# Patient Record
Sex: MALE | Race: AMERICAN INDIAN OR ALASKA NATIVE | NOT HISPANIC OR LATINO | Employment: OTHER | URBAN - METROPOLITAN AREA
[De-identification: names, ages, dates, MRNs, and addresses within clinical notes are randomized per-mention and may not be internally consistent; named-entity substitution may affect disease eponyms.]

---

## 2017-09-27 ENCOUNTER — ALLSCRIPTS OFFICE VISIT (OUTPATIENT)
Dept: OTHER | Facility: OTHER | Age: 47
End: 2017-09-27

## 2017-11-17 ENCOUNTER — ALLSCRIPTS OFFICE VISIT (OUTPATIENT)
Dept: OTHER | Facility: OTHER | Age: 47
End: 2017-11-17

## 2017-11-19 NOTE — PROGRESS NOTES
Assessment    1  Otitis media, right (382 9) (H66 91)   2  BMI 30 0-30 9,adult (V85 30) (Z68 30)   3  Never a smoker    Plan  BMI 30 0-30 9,adult    · Begin a limited exercise program ; Status:Complete;   Done: [id number]  Otitis media, right    · Amoxicillin-Pot Clavulanate 875-125 MG Oral Tablet; TAKE 1 TABLET EVERY 12HOURS DAILY    Discussion/Summary    Drink plenty of fluids  Saline nasal rinses or spray as needed for congestion  Salt water gargles and hot tea with honey and lemon for sore throat  May use Mucinex D for sinus congestion  Take antibiotics until finished  Follow up as needed for persistent or worsening symptoms  Possible side effects of new medications were reviewed with the patient/guardian today  The treatment plan was reviewed with the patient/guardian  The patient/guardian understands and agrees with the treatment plan      Chief Complaint  Sinus pressure pressure in ears rmklpn      History of Present Illness  HPI: Over a week ago he developed URI symptoms  He has sinus pressure and his ears are bothering him  He has continued sinus congestion, wheezing  Denies fevers, cough, or SOB  He is not taking anything OTC for symptoms  Review of Systems   Constitutional: no fever-- and-- no chills  ENT: as noted in HPI  Cardiovascular: no chest pain  Respiratory: wheezing, but-- no shortness of breath-- and-- no cough  Neurological: headache  Active Problems    1  Allergic rhinitis (477 9) (J30 9)   2  Elevated liver enzymes (790 5) (R74 8)   3  Fatigue (780 79) (R53 83)   4  Hyperlipidemia (272 4) (E78 5)   5  Hypothyroidism (244 9) (E03 9)   6  Impaired fasting glucose (790 21) (R73 01)   7  Impingement syndrome of right shoulder (726 2) (M75 41)   8  Joint pain, knee (719 46) (M25 569)   9  Limb pain (729 5) (M79 609)   10  Lumbago (724 2) (M54 5)   11  Organic impotence (607 84) (N52 9)   12  Right knee pain (719 46) (M25 561)   13  Rosacea (695 3) (L71 9)   14   Tear of medial meniscus of right knee, initial encounter (836 0) (S83 241A)   15  Tear of medial meniscus of right knee, subsequent encounter (V58 89,836 0)  (V53 870V)    Past Medical History    1  History of Acute allergic conjunctivitis (372 14) (H10 10)   2  History of Acute maxillary sinusitis (461 0) (J01 00)   3  Acute otitis externa (380 10) (H60 509)   4  History of Acute upper respiratory infection (465 9) (J06 9)   5  Cerumen impaction (380 4) (H61 20)   6  History of Dysuria (788 1) (R30 0)   7  History of Facial cellulitis (682 0) (L03 211)   8  History of acute bronchitis (V12 69) (Z87 09)   9  History of acute sinusitis (V12 69) (Z87 09)   10  History of acute sinusitis (V12 69) (Z87 09)   11  History of acute sinusitis (V12 69) (Z87 09)   12  History of folliculitis (B54 9) (W07 5)   13  History of low back pain (V13 59) (Z87 39)   14  History of seborrheic dermatitis (V13 3) (Z87 2)   15  Influenza vaccine needed (V04 81) (Z23)   16  History of Otitis externa (380 10) (H60 90)   17  Otitis media, left (382 9) (Q34 75)  Active Problems And Past Medical History Reviewed: The active problems and past medical history were reviewed and updated today  Family History  Mother    1  Family history of Malignant neoplasm of female breast, unspecified laterality, unspecified site of breast  Father    2  No pertinent family history  Sister    3  Family history of malignant neoplasm (V16 9) (Z80 9)    Social History   · Alcohol use (V49 89) (Z78 9)   · Never a smoker   · History of Never a smoker  The social history was reviewed and is unchanged  Surgical History    1  History of Knee Surgery    Current Meds   1  Synthroid 100 MCG Oral Tablet; take 1 tablet by mouth once daily; Therapy: 69Mau9914 to (Evaluate:07Apr2018)  Requested for: 76AZL9746; Last Rx:09Oct2017 Ordered    The medication list was reviewed and updated today  Allergies  1   No Known Drug Allergies    Vitals   Recorded: 47SOT4545 04:13PM Temperature 97 8 F   Heart Rate 66   Respiration 18   Systolic 321   Diastolic 80   Height 5 ft 9 in   Weight 204 lb    BMI Calculated 30 13   BSA Calculated 2 08       Physical Exam   Constitutional  General appearance: No acute distress, well appearing and well nourished  Eyes  Conjunctiva and lids: No swelling, erythema, or discharge  Ears, Nose, Mouth, and Throat  Otoscopic examination: Abnormal   The right tympanic membrane was red-- and-- was bulging  The left tympanic membrane was normal   Nasal mucosa, septum, and turbinates: Abnormal   There was a mucoid discharge from both nares  Oropharynx: Normal with no erythema, edema, exudate or lesions  Pulmonary  Respiratory effort: No increased work of breathing or signs of respiratory distress  Auscultation of lungs: Abnormal   Auscultation of the lungs revealed expiratory wheezing  Cardiovascular  Auscultation of heart: Normal rate and rhythm, normal S1 and S2, without murmurs  Examination of extremities for edema and/or varicosities: Normal    Lymphatic  Palpation of lymph nodes in neck: No lymphadenopathy  Skin  Skin and subcutaneous tissue: Normal without rashes or lesions  Psychiatric  Mood and affect: Normal          Attending Note  Collaborating Physician Note: Collaborating Note: I agree with the Advanced Practitioner note        Signatures   Electronically signed by : Lavera Saint; Nov 17 2017  4:23PM EST                       (Author)    Electronically signed by : Lucas Block DO; Nov 17 2017  4:32PM EST                       (Review)

## 2017-11-27 ENCOUNTER — ALLSCRIPTS OFFICE VISIT (OUTPATIENT)
Dept: OTHER | Facility: OTHER | Age: 47
End: 2017-11-27

## 2017-11-28 NOTE — PROGRESS NOTES
Assessment    1  Otitis media, right (382 9) (H66 91)   2  Allergic reaction (995 3) (T78 40XA)    Discussion/Summary    Allergic reaction - suspect reaction to Augmentin  Advised to avoid Penicillinsmedia - improving  Chief Complaint  achy everywhere skin very sensitive even scalp tired and feels lethargic klpn      History of Present Illness  HPI: He has finished his Augmentin  He is still lethargic  His skin is sensitive  he is achy  he has had fevers  Review of Systems   ENT: earache, but-- no sore throat  Respiratory: cough  Active Problems    1  Allergic rhinitis (477 9) (J30 9)   2  BMI 30 0-30 9,adult (V85 30) (Z68 30)   3  Elevated liver enzymes (790 5) (R74 8)   4  Fatigue (780 79) (R53 83)   5  Hyperlipidemia (272 4) (E78 5)   6  Hypothyroidism (244 9) (E03 9)   7  Impaired fasting glucose (790 21) (R73 01)   8  Impingement syndrome of right shoulder (726 2) (M75 41)   9  Joint pain, knee (719 46) (M25 569)   10  Limb pain (729 5) (M79 609)   11  Lumbago (724 2) (M54 5)   12  Organic impotence (607 84) (N52 9)   13  Otitis media, right (382 9) (H66 91)   14  Right knee pain (719 46) (M25 561)   15  Rosacea (695 3) (L71 9)   16  Tear of medial meniscus of right knee, initial encounter (836 0) (S83 241A)   17  Tear of medial meniscus of right knee, subsequent encounter (V58 89,836 0)  (T49 912W)    Past Medical History    1  History of Acute allergic conjunctivitis (372 14) (H10 10)   2  History of Acute maxillary sinusitis (461 0) (J01 00)   3  Acute otitis externa (380 10) (H60 509)   4  History of Acute upper respiratory infection (465 9) (J06 9)   5  Cerumen impaction (380 4) (H61 20)   6  History of Dysuria (788 1) (R30 0)   7  History of Facial cellulitis (682 0) (L03 211)   8  History of acute bronchitis (V12 69) (Z87 09)   9  History of acute sinusitis (V12 69) (Z87 09)   10  History of acute sinusitis (V12 69) (Z87 09)   11  History of acute sinusitis (V12 69) (Z87 09)   12  History of folliculitis (L47 4) (Q45 1)   13  History of low back pain (V13 59) (Z87 39)   14  History of seborrheic dermatitis (V13 3) (Z87 2)   15  Influenza vaccine needed (V04 81) (Z23)   16  History of Otitis externa (380 10) (H60 90)   17  Otitis media, left (382 9) (H66 92)    Family History  Mother    1  Family history of Malignant neoplasm of female breast, unspecified laterality, unspecified site of breast  Father    2  No pertinent family history  Sister    3  Family history of malignant neoplasm (V16 9) (Z80 9)    Social History   · Alcohol use (V49 89) (Z78 9)   · Never a smoker   · History of Never a smoker    Surgical History    1  History of Knee Surgery    Current Meds   1  Synthroid 100 MCG Oral Tablet; take 1 tablet by mouth once daily; Therapy: 57Xro6939 to (Evaluate:07Apr2018)  Requested for: 58YDG6859; Last Rx:09Oct2017 Ordered    Allergies  1  Augmentin    Vitals   Recorded: 10UVQ0278 10:25AM   Temperature 97 2 F   Heart Rate 72   Respiration 18   Systolic 582   Diastolic 70   Height 5 ft 9 in   Weight 202 lb    BMI Calculated 29 83   BSA Calculated 2 07       Physical Exam   Constitutional  General appearance: No acute distress, well appearing and well nourished  Ears, Nose, Mouth, and Throat  External inspection of ears and nose: Normal    Otoscopic examination: Tympanic membrance translucent with normal light reflex  Canals patent without erythema  Oropharynx: Normal with no erythema, edema, exudate or lesions  Pulmonary  Auscultation of lungs: Clear to auscultation, equal breath sounds bilaterally, no wheezes, no rales, no rhonci  no rales or crackles were heard bilaterally  no rhonchi  no friction rub  no wheezing  Cardiovascular  Auscultation of heart: Normal rate and rhythm, normal S1 and S2, without murmurs     Skin  Skin and subcutaneous tissue: Abnormal  -- diffuse erythematous rash over upper back and chest         Signatures   Electronically signed by : Radha Allen DO; Nov 27 2017 10:43AM EST                       (Author)

## 2018-01-08 ENCOUNTER — APPOINTMENT (OUTPATIENT)
Dept: RADIOLOGY | Facility: CLINIC | Age: 48
End: 2018-01-08
Payer: COMMERCIAL

## 2018-01-08 ENCOUNTER — ALLSCRIPTS OFFICE VISIT (OUTPATIENT)
Dept: OTHER | Facility: OTHER | Age: 48
End: 2018-01-08

## 2018-01-08 DIAGNOSIS — M25.569 PAIN IN KNEE: ICD-10-CM

## 2018-01-08 PROCEDURE — 73562 X-RAY EXAM OF KNEE 3: CPT

## 2018-01-09 NOTE — PROGRESS NOTES
Assessment   1  Localized osteoarthritis of right knee (548 05) (M17 11)    Plan   Joint pain, knee    · * XR KNEE 3 VW RIGHT NON INJURY; Status:Active - Retrospective By Protocol    Authorization; Requested FCK:[id number];       Angelique Mena appears to have arthritis about his right knee and did tolerate the injection well today  I also gave him a knee brace as he does have some instability upon valgus stress testing  We did talk about possibly in the future for Euflexxa injections  If he fails to show improvement over the next 3-4 weeks, he will return for Euflexxa injections and will call prior to that appointment so that we may be able to order them  Discussion/Summary   The patient was counseled regarding diagnostic results,-- instructions for management,-- prognosis,-- patient and family education,-- impressions,-- risks and benefits of treatment options,-- importance of compliance with treatment  Chief Complaint   1  Knee Pain    History of Present Illness   Angeles Boyle returns to see me today stating that his right knee is giving him pain is a sharp and moderate and intermittent discomfort that is becoming more constant  It radiates medially  It is worse with more walking and somewhat better with rest   He feels unstable sometimes with a   He has had 2 previous knee surgeries on this side with the last 1 performed by me about a year and a half ago in 2016 for a medial meniscectomy revision  The left knee is also bothering him with more mild and dull and intermittent discomfort  It does radiate more medially as well  Review of Systems        Constitutional: No fever or chills, feels well, no tiredness, no recent weight loss or weight gain  Eyes: No complaints of red eyes, no eyesight problems  ENT: no complaints of loss of hearing, no nosebleeds, no sore throat  Cardiovascular: No complaints of chest pain, no palpitations, no leg claudication or lower extremity edema        Respiratory: No complaints of shortness of breath, no wheezing, no cough  Gastrointestinal: No complaints of abdominal pain, no constipation, no nausea or vomiting, no diarrhea or bloody stools  Genitourinary: No complaints of dysuria or incontinence, no hesitancy, no nocturia  Musculoskeletal: as noted in HPI  Integumentary: No complaints of skin rash or lesion, no itching or dry skin, no skin wounds  Neurological: No complaints of headache, no confusion, no numbness or tingling, no dizziness  Psychiatric: No suicidal thoughts, no anxiety, no depression  Endocrine: No muscle weakness, no frequent urination, no excessive thirst, no feelings of weakness  Active Problems   1  Allergic reaction (995 3) (T78 40XA)   2  Allergic rhinitis (477 9) (J30 9)   3  BMI 30 0-30 9,adult (V85 30) (Z68 30)   4  Elevated liver enzymes (790 5) (R74 8)   5  Fatigue (780 79) (R53 83)   6  Hyperlipidemia (272 4) (E78 5)   7  Hypothyroidism (244 9) (E03 9)   8  Impaired fasting glucose (790 21) (R73 01)   9  Impingement syndrome of right shoulder (726 2) (M75 41)   10  Joint pain, knee (719 46) (M25 569)   11  Limb pain (729 5) (M79 609)   12  Lumbago (724 2) (M54 5)   13  Organic impotence (607 84) (N52 9)   14  Otitis media, right (382 9) (H66 91)   15  Right knee pain (719 46) (M25 561)   16  Rosacea (695 3) (L71 9)   17  Tear of medial meniscus of right knee, initial encounter (836 0) (S83 241A)   18   Tear of medial meniscus of right knee, subsequent encounter (V58 89,836 0)      (P78 865N)    Past Medical History    · History of Acute allergic conjunctivitis (372 14) (H10 10)   · History of Acute maxillary sinusitis (461 0) (J01 00)   · Acute otitis externa (380 10) (H60 509)   · History of Acute upper respiratory infection (465 9) (J06 9)   · Cerumen impaction (380 4) (H61 20)   · History of Dysuria (788 1) (R30 0)   · History of Facial cellulitis (682 0) (L03 211)   · History of acute bronchitis (V12 69) (Z87 09)   · History of acute sinusitis (V12 69) (Z87 09)   · History of acute sinusitis (V12 69) (Z87 09)   · History of acute sinusitis (V12 69) (Z87 09)   · History of folliculitis (P08 9) (P84 8)   · History of low back pain (V13 59) (Z87 39)   · History of seborrheic dermatitis (V13 3) (Z87 2)   · Influenza vaccine needed (V04 81) (Z23)   · History of Otitis externa (380 10) (H60 90)   · Otitis media, left (382 9) (H66 92)     The active problems and past medical history were reviewed and updated today  Surgical History    · History of Knee Surgery     The surgical history was reviewed and updated today  Family History   Mother    · Family history of Malignant neoplasm of female breast, unspecified laterality, unspecified    site of breast  Father    · No pertinent family history  Sister    · Family history of malignant neoplasm (V16 9) (Z80 9)     The family history was reviewed and updated today  Social History    · Alcohol use (V49 89) (Z78 9)   · Never a smoker   · History of Never a smoker  The social history was reviewed and updated today  Current Meds    1  Synthroid 100 MCG Oral Tablet (Levothyroxine Sodium); take 1 tablet by mouth once     daily; Therapy: 98Zlu3898 to (Evaluate:07Apr2018)  Requested for: 60WDI5932; Last     Rx:09Oct2017 Ordered     The medication list was reviewed and updated today  Allergies   1  Augmentin    Physical Exam      Right Knee: Appearance: genu varum  Tenderness: medial joint line, but-- not over the lateral joint line  Flexion: normal AROM  Extension: normal AROM  Flexion was 5/5  Extension was 5/5  Special Test: positive laxity on Valgus Stress (1), but-- negative Anterior Drawer sign,-- negative Posterior Drawer sign-- and-- no laxity on Varus Stress  Left Knee: Appearance: Normal  Tenderness: None  ROM: Full  Motor: Normal  Special Test: no laxity on Valgus Stress-- and-- no laxity on Varus Stress        Constitutional - General appearance: Normal       Musculoskeletal - Gait and station: Normal -- Digits and nails: Normal -- Muscle strength/tone: Normal -- Lower extremity compartments: Normal       Cardiovascular - Pulses: Normal -- Examination of extremities for edema and/or varicosities: Normal       Lymphatic - Palpation of lymph nodes in other areas: Normal  no right femoral node enlargement-- and-- no left femoral node enlargement  Skin - Skin and subcutaneous tissue: Normal       Neurologic - Sensation: Normal -- Lower extremity peripheral neuro exam: Normal       Psychiatric - Orientation to person, place, and time: Normal -- Mood and affect: Normal       Eyes      Conjunctiva and lids: Normal        Pupils and irises: Normal        Results/Data   I personally reviewed the films/images/results in the office today  My interpretation follows  X-ray Review Right knee x-rays demonstrate mild to moderate medial compartment narrowing  There is also a small ossification adjacent to the medial femoral condyle which may be indicative of a chronic MCL injury  The left knee has no obvious signs arthritis  Procedure      Procedure: Injection of the right knee joint  Indication:  Osteoarthritis  Potential complications include bleeding,-- infection-- and-- allergic reaction  Risk, benefits and alternatives were discussed with the patient  Verbal consent was obtained prior to the procedure  Betadine was used to prep the area  ethyl chloride spray was used as a topical anesthetic  Using sterile technique, the aspiration/injection needle was then directed from a Anterolateral aspect  A 22-gauge was used to inject 4cc of 1% Lidocaine-- and-- 1mL of 4 mg/mL dexamethasone  A bandage was applied  the patient tolerated the procedure well  Complications: none        Signatures    Electronically signed by : JENNIFER Guevara ; Jan 8 2018 12:14PM EST                       (Author)

## 2018-01-12 NOTE — MISCELLANEOUS
Message  Charles Fernandez is under my professional care  Ema Juárez was last seen in our office on July 8, 2016, and was sent to Physical Therapy for 4-6 weeks and to follow up with as needed  Ema Juárez recently checked in with us stating he was doing better and felt as if he is ready to go back to work  Ema Juárez is cleared for full duty with no restrictions at this time  If you have any further questions please give our office a call  Return to work or school:      SINCERELY,          [address]        Signatures   Electronically signed by : JENNIFER Montaño ; Aug 15 2016  4:33PM EST

## 2018-01-13 VITALS
BODY MASS INDEX: 29.92 KG/M2 | WEIGHT: 202 LBS | SYSTOLIC BLOOD PRESSURE: 110 MMHG | DIASTOLIC BLOOD PRESSURE: 70 MMHG | HEIGHT: 69 IN | TEMPERATURE: 97.2 F | RESPIRATION RATE: 18 BRPM | HEART RATE: 72 BPM

## 2018-01-13 VITALS
SYSTOLIC BLOOD PRESSURE: 126 MMHG | BODY MASS INDEX: 30.07 KG/M2 | WEIGHT: 203 LBS | RESPIRATION RATE: 18 BRPM | DIASTOLIC BLOOD PRESSURE: 80 MMHG | HEART RATE: 74 BPM | TEMPERATURE: 97.2 F | HEIGHT: 69 IN

## 2018-01-14 VITALS
DIASTOLIC BLOOD PRESSURE: 80 MMHG | TEMPERATURE: 97.8 F | RESPIRATION RATE: 18 BRPM | HEART RATE: 66 BPM | HEIGHT: 69 IN | SYSTOLIC BLOOD PRESSURE: 120 MMHG | BODY MASS INDEX: 30.21 KG/M2 | WEIGHT: 204 LBS

## 2018-01-14 NOTE — RESULT NOTES
Verified Results  (1) COMPREHENSIVE METABOLIC PANEL [id number] 19:65OT Vernel Croissant     Test Name Result Flag Reference   Glucose, Serum 92 mg/dL  65-99   BUN 17 mg/dL  6-24   Creatinine, Serum 1 27 mg/dL  0 76-1 27   eGFR If NonAfricn Am 66 mL/min/1 73  >59   eGFR If Africn Am 77 mL/min/1 73  >59   BUN/Creatinine Ratio 13  9-20   Sodium, Serum 138 mmol/L  134-144   Potassium, Serum 5 0 mmol/L  3 5-5 2   Chloride, Serum 97 mmol/L     Carbon Dioxide, Total 23 mmol/L  18-29   Calcium, Serum 9 2 mg/dL  8 7-10 2   Protein, Total, Serum 6 4 g/dL  6 0-8 5   Albumin, Serum 4 5 g/dL  3 5-5 5   Globulin, Total 1 9 g/dL  1 5-4 5   A/G Ratio 2 4  1 1-2 5   Bilirubin, Total 0 7 mg/dL  0 0-1 2   Alkaline Phosphatase, S 45 IU/L     AST (SGOT) 21 IU/L  0-40   ALT (SGPT) 17 IU/L  0-44     (1) LIPID PANEL FASTING W DIRECT LDL REFLEX 09Zit6813 09:55AM Vernel Croissant     Test Name Result Flag Reference   Cholesterol, Total 175 mg/dL  100-199   Triglycerides 40 mg/dL  0-149   HDL Cholesterol 50 mg/dL  >39   According to ATP-III Guidelines, HDL-C >59 mg/dL is considered a  negative risk factor for CHD  LDL Cholesterol Calc 117 mg/dL H 0-99     (1) TSH 59Tcq0693 09:55AM Vernel Croissant     Test Name Result Flag Reference   TSH 1 590 uIU/mL  0 450-4 500     [facility]) Thyroxine (T4) Free, Direct, S 78Hyp3330 09:55AM Vernel Croissant     Test Name Result Flag Reference   T4,Free(Direct) 1 41 ng/dL  0 82-1 77     [facility]) Cardiovascular Risk Assessment 23Tgm4723 09:55AM Jonn Pereyra courtesy copy of this report has been sent to  the patient  Test Name Result Flag Reference   Interpretation Note     Supplement report is available  PDF Image   Discussion/Summary   Sheree Chaudhary,   Your cholesterol has improved  Thyroid levels are normal, please continue your current medication     Sugar, kidney function and liver function are normal    Dr Kandi Bradshaw

## 2018-01-17 NOTE — PROGRESS NOTES
Assessment    1  Tear of medial meniscus of right knee, subsequent encounter (V58 89,836 0)   (S83 241D)   2  Impingement syndrome of right shoulder (726 2) (Y63 41)        Right medial meniscus tear   Patient is not complaining of pain but notes instability of right knee  Discussed with patient if knee pain becomes worse we will do a knee arthroscopy for medial meniscectomy  Discussed with patient that his right shoulder impingement is due to a small subacromial spur  Provided home exercises to work on strength  Plan   Right knee athroscopy as needed for further evaluation & treatment of right MCL tear  Right shoulder impingement syndrome, follow up as needed  Discussion/Summary  The patient was counseled regarding diagnostic results, instructions for management, prognosis, patient and family education, impressions, risks and benefits of treatment options, importance of compliance with treatment  Chief Complaint    1  Knee Pain   2  Shoulder Pain    History of Present Illness  HPI: 50 yold Male  h/o right knee athroscopy 25 years ago present with right knee sharp and moderate pain, weakness and instability  patient notes that wearing a knee brace helps with the right knee instability  patient also notes he has right shoulder pain  He notes that he can no longer do pull ups or lift objects over his head  The pains are intermittent      Review of Systems    Constitutional: fever  Eyes: red eyes  ENT: no complaints of loss of hearing, no nosebleeds, no sore throat  Cardiovascular: No complaints of chest pain, no palpitations, no leg claudication or lower extremity edema  Respiratory: cough, but no shortness of breath and no wheezing  Gastrointestinal: No complaints of abdominal pain, no constipation, no nausea or vomiting, no diarrhea or bloody stools  Genitourinary: No complaints of dysuria or incontinence, no hesitancy, no nocturia  Musculoskeletal: as noted in HPI  Integumentary: dry skin  Neurological: headache  Psychiatric: No suicidal thoughts, no anxiety, no depression  Endocrine: No muscle weakness, no frequent urination, no excessive thirst, no feelings of weakness  ROS reviewed  Active Problems    1  Acute upper respiratory infection (465 9) (J06 9)   2  Elevated liver enzymes (790 5) (R74 8)   3  Fatigue (780 79) (R53 83)   4  Hyperlipidemia (272 4) (E78 5)   5  Hypothyroidism (244 9) (E03 9)   6  Impaired fasting glucose (790 21) (R73 01)   7  Joint pain, knee (719 46) (M25 569)   8  Limb pain (729 5) (M79 609)   9  Lumbago (724 2) (M54 5)   10  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)   11  Otitis externa (380 10) (H60 90)   12  Right knee pain (719 46) (M25 561)   13  Rosacea (695 3) (L71 9)   14  Seborrheic dermatitis (690 10) (L21 9)    Past Medical History    · History of Acute maxillary sinusitis (461 0) (J01 00)   · Cerumen impaction (380 4) (H61 20)   · History of Dysuria (788 1) (R30 0)   · History of Facial cellulitis (682 0) (L03 211)   · History of acute bronchitis (V12 69) (Z87 09)   · History of acute sinusitis (V12 69) (Z87 09)   · History of acute sinusitis (V12 69) (Z87 09)   · History of acute sinusitis (V12 69) (Z87 09)   · History of folliculitis (D61 1) (U09 2)   · History of low back pain (V13 59) (Z87 39)   · Influenza vaccine needed (V04 81) (Z23)   · Otitis media, left (382 9) (H66 92)    The active problems and past medical history were reviewed and updated today  Surgical History    · History of Knee Surgery    The surgical history was reviewed and updated today  Family History    · Family history of Malignant neoplasm of female breast, unspecified laterality, unspecified  site of breast    · No pertinent family history    · Family history of malignant neoplasm (V16 9) (Z80 9)    The family history was reviewed and updated today         Social History    · Alcohol use (V49 89) (F10 99)   · Never a smoker  The social history was reviewed and updated today  Current Meds   1  Ciprodex 0 3-0 1 % Otic Suspension; instill 4 drops into affected ear twice a day; Therapy: 73DEI9040 to (Natalya Lester)  Requested for: 05SPZ0558; Last   Rx:06Jan2016 Ordered   2  Synthroid 100 MCG Oral Tablet; Take 1 tablet daily; name brand medically necessary; Therapy: 41Hjq1334 to (Evaluate:20Jan2016)  Requested for: 42Cws9540; Last   Rx:25Oxr0565 Ordered    The medication list was reviewed and updated today  Allergies    1  No Known Drug Allergies    Physical Exam    Right Knee: Appearance: Normal  Tenderness: None except the medial joint line  ROM: Full  Motor: Normal  Special Test: Negative except no laxity on Valgus Stress and no laxity on Varus Stress  Right Shoulder: Appearance: Normal  Tenderness: None except the  ROM: Full except as noted: Motor: Normal  Extension: painful restricted AROM 100 degrees  Special Tests: Negative except positive Gaspar test and positive Neer test    Constitutional - General appearance: Normal    Musculoskeletal - Gait and station: Normal  Digits and nails: Normal  Muscle strength/tone: Normal  Upper extremity compartments: Normal    Cardiovascular - Pulses: Normal  Examination of extremities for edema and/or varicosities: Normal    Lymphatic - Palpation of lymph nodes in other areas: Normal  no right femoral node enlargement  Skin - Skin and subcutaneous tissue: Normal    Neurologic - Sensation: Normal  Upper extremity peripheral neuro exam: Normal  Lower extremity peripheral neuro exam: Normal    Psychiatric - Orientation to person, place, and time: Normal  Mood and affect: Normal    Eyes   Conjunctiva and lids: Normal     Pupils and irises: Normal        Results/Data  I personally reviewed the films/images/results in the office today  My interpretation follows  MRI Review Right knee MRI demonstrates a complex medial meniscus tear        Signatures   Electronically signed by : Allen Maddox JENNIFER Walters ; Jan 22 2016  6:31PM EST                       (Author)

## 2018-04-04 DIAGNOSIS — E03.9 PRIMARY HYPOTHYROIDISM: Primary | ICD-10-CM

## 2018-04-04 PROBLEM — M17.11 LOCALIZED OSTEOARTHRITIS OF RIGHT KNEE: Status: ACTIVE | Noted: 2018-01-08

## 2018-04-04 RX ORDER — LEVOTHYROXINE SODIUM 100 MCG
100 TABLET ORAL DAILY
Qty: 90 TABLET | Refills: 1 | Status: SHIPPED | OUTPATIENT
Start: 2018-04-04 | End: 2018-10-08 | Stop reason: SDUPTHER

## 2018-04-26 NOTE — TELEPHONE ENCOUNTER
Tracey, Patient Marcella Redman came into the office today, stating that he dropped off a form for supplemental insurance about 10 days ago to be filled out   Please advise

## 2018-05-01 NOTE — TELEPHONE ENCOUNTER
Can you call patient to see who in the front he dropped the forms off to? [facility] has not received any forms

## 2018-05-01 NOTE — TELEPHONE ENCOUNTER
Called patient and advised  He will bring in new paperwork  Advised to have it given to me so I can get it done for him  He may be coming in Friday  If before please place at my desk

## 2018-05-03 NOTE — TELEPHONE ENCOUNTER
Paperwork was put on my desk  I called patient and left a message advising paperwork has been completed and is ready for  at the , [facility]

## 2018-06-27 VITALS
HEART RATE: 63 BPM | DIASTOLIC BLOOD PRESSURE: 64 MMHG | SYSTOLIC BLOOD PRESSURE: 101 MMHG | WEIGHT: 214.6 LBS | BODY MASS INDEX: 31.78 KG/M2 | HEIGHT: 69 IN

## 2018-06-27 DIAGNOSIS — M17.31 POST-TRAUMATIC OSTEOARTHRITIS OF RIGHT KNEE: Primary | ICD-10-CM

## 2018-06-27 PROCEDURE — 99214 OFFICE O/P EST MOD 30 MIN: CPT | Performed by: ORTHOPAEDIC SURGERY

## 2018-06-27 NOTE — PROGRESS NOTES
Assessment/Plan:  1  Post-traumatic osteoarthritis of right knee  Injection procedure prior authorization     Scribe Attestation    I,:   Nicanor Matamoros am acting as a scribe while in the presence of the attending physician :        I,:   Petar Avina MD personally performed the services described in this documentation    as scribed in my presence :            Gilson Tee upon examination today does demonstrate signs and symptoms consistent with osteoarthritis of the right knee  He does have a history of medial meniscectomy of this knee approximately 2 years ago  He did have a benign examination today overall however was point tender at the medial joint line  Did discuss with him options of treatment such as cortisone injection to help with inflammation  As well as the Euflexxa 3 shot series to try and replenished the joint fluid that is lost with osteoarthritis  Additionally I did offer to prescribe an MRI to question a tear at the meniscus  Today Gilson Tee opted for the Euflexxa 3 shot series  I will put a prescription for this today  I did discuss with Gilson Tee that my office will contact him when we receive authorization and when the shots are delivered and he will schedule accordingly to receive injections  He may continue working as tolerated  I did note however when he does get the Euflexxa shots he will be sore and should plan accordingly for work  I will see Gilson Tee back when his injections are delivered  Subjective:   Josiah Lopez is a 50 y o  male who presents right knee pain  He states he is experiencing intermittent and moderate sharp pain about the medial and anterior aspect of his right knee  Pain is exacerbated with prolonged weight-bearing, and a plant and twist mechanism  Pain is better at rest   Additionally, he does note the giving out sensation in the knee  However it has not resulted in a fall  He denies any swelling about the knee, or distal paresthesias    He does have a history of medial meniscectomy approximately 2 years ago  He was provided a cortisone injection last year that did provide relief  Review of Systems   Constitutional: Negative  HENT: Negative  Eyes: Negative  Respiratory: Negative  Cardiovascular: Negative  Gastrointestinal: Negative  Endocrine: Negative  Genitourinary: Negative  Musculoskeletal: Positive for arthralgias  Skin: Negative  Allergic/Immunologic: Negative  Neurological: Negative  Hematological: Negative  Psychiatric/Behavioral: Negative  Past Medical History:   Diagnosis Date    Pollen allergies        Past Surgical History:   Procedure Laterality Date    APPENDECTOMY  1985    ARTHROSCOPY KNEE Right 6/30/2016    Procedure: ARTHROSCOPY KNEE, MEDIAL MENISCECTOMY;  Surgeon: Bessy Das MD;  Location: [place] [address] MAIN OR;  Service:    [facility] INJECTION ANESTHETIC AGENT TO CERVICAL PLEXUS      KNEE ARTHROSCOPY Right 1990       Family History   Problem Relation Age of Onset    Cancer Mother         breast    Cancer Sister         3 sisters thyroid CA    No Known Problems Father        Social History     Occupational History    Not on file  Social History Main Topics    Smoking status: Never Smoker    Smokeless tobacco: Never Used    Alcohol use Yes      Comment: socially    Drug use: No    Sexual activity: Not on file         Current Outpatient Prescriptions:     cetirizine-pseudoephedrine (ZyrTEC-D) 5-120 MG per tablet, Take 1 tablet by mouth as needed for allergies  , Disp: , Rfl:     SYNTHROID 100 MCG tablet, Take 1 tablet (100 mcg total) by mouth daily Name brand medically necessary, Disp: 90 tablet, Rfl: 1    Allergies   Allergen Reactions    Amoxicillin-Pot Clavulanate Rash       Objective:  Vitals:    06/27/18 1438   BP: 101/64   Pulse: 63       Right Knee Exam     Tenderness   The patient is experiencing tenderness in the medial joint line      Range of Motion   Extension: 0   Flexion: 140 Tests   Sherry:  Medial - negative Lateral - negative  Drawer:       Anterior - negative    Posterior - negative  Varus: negative  Valgus: positive    Other   Erythema: absent  Scars: absent  Sensation: normal  Pulse: present  Swelling: none  Other tests: no effusion present    Comments:  Weight-bearing squat:  Negative for pain  Knee extension:  5/5  Knee flexion:  5/5          Observations     Right Knee   Negative for effusion  Physical Exam   Constitutional: He is oriented to person, place, and time  He appears well-developed and well-nourished  HENT:   Head: Normocephalic and atraumatic  Eyes: Conjunctivae are normal    Neck: Normal range of motion  Cardiovascular: Normal rate  Pulmonary/Chest: Effort normal    Musculoskeletal:        Right knee: He exhibits no effusion  As noted in HPI   Neurological: He is alert and oriented to person, place, and time  Skin: Skin is warm and dry  Psychiatric: He has a normal mood and affect  His behavior is normal  Judgment and thought content normal        I have personally reviewed pertinent films in PACS and my interpretation is as follows:    X-rays of the right knee from 1/8/18 demonstrates medial joint line narrowing indicating mild to moderate osteoarthritis

## 2018-07-31 ENCOUNTER — TELEPHONE (OUTPATIENT)
Dept: OBGYN CLINIC | Facility: CLINIC | Age: 48
End: 2018-07-31

## 2018-07-31 NOTE — TELEPHONE ENCOUNTER
Patient looking for status on Euflexxa Injection for right knee  Please call and advised him with update

## 2018-08-01 ENCOUNTER — TELEPHONE (OUTPATIENT)
Dept: FAMILY MEDICINE CLINIC | Facility: CLINIC | Age: 48
End: 2018-08-01

## 2018-08-01 ENCOUNTER — OFFICE VISIT (OUTPATIENT)
Dept: FAMILY MEDICINE CLINIC | Facility: CLINIC | Age: 48
End: 2018-08-01
Payer: COMMERCIAL

## 2018-08-01 VITALS
HEART RATE: 88 BPM | WEIGHT: 215 LBS | BODY MASS INDEX: 31.84 KG/M2 | SYSTOLIC BLOOD PRESSURE: 120 MMHG | HEIGHT: 69 IN | RESPIRATION RATE: 16 BRPM | DIASTOLIC BLOOD PRESSURE: 90 MMHG | TEMPERATURE: 98 F

## 2018-08-01 DIAGNOSIS — L02.91 ABSCESS: Primary | ICD-10-CM

## 2018-08-01 DIAGNOSIS — Z23 NEED FOR TDAP VACCINATION: ICD-10-CM

## 2018-08-01 PROCEDURE — 90471 IMMUNIZATION ADMIN: CPT

## 2018-08-01 PROCEDURE — 90715 TDAP VACCINE 7 YRS/> IM: CPT

## 2018-08-01 PROCEDURE — 99213 OFFICE O/P EST LOW 20 MIN: CPT | Performed by: NURSE PRACTITIONER

## 2018-08-01 RX ORDER — SULFAMETHOXAZOLE AND TRIMETHOPRIM 800; 160 MG/1; MG/1
1 TABLET ORAL EVERY 12 HOURS SCHEDULED
Qty: 20 TABLET | Refills: 0 | Status: SHIPPED | OUTPATIENT
Start: 2018-08-01 | End: 2018-08-11

## 2018-08-01 NOTE — PROGRESS NOTES
Assessment/Plan:  Warm compresses at the site  Take medication with food  It is important that you take the entire course of antibiotics prescribed  May also take a probiotic of your choice to maintain healthy GI kortney  Can take some probiotic and yogurt with the medication  Supportive care discussed and advised  Follow up for no improvement and worsening of conditions  Patient advised and educated when to see immediate medical care  Diagnoses and all orders for this visit:    Abscess  -     sulfamethoxazole-trimethoprim (BACTRIM DS) 800-160 mg per tablet; Take 1 tablet by mouth every 12 (twelve) hours for 10 days    Need for Tdap vaccination  -     TDAP VACCINE GREATER THAN OR EQUAL TO 6YO IM    BMI 31 0-31 9,adult          Return if symptoms worsen or fail to improve  Subjective:      Patient ID: Andrzej Peres is a 50 y o  male  Chief Complaint   Patient presents with    lesion on neck     prcma       HPI   Patient stated that stepped on the nail couple of weeks ago but no wound and needs tetanus shot  Patient developed lump on right side of face under jaw bone area and stated that it is red, and swollen and not going away  Denies fever, chills and discharge from the area and stated that could be ingrown hair  The following portions of the patient's history were reviewed and updated as appropriate: allergies, current medications, past family history, past medical history, past social history, past surgical history and problem list       Review of Systems   Constitutional: Negative  Respiratory: Negative  Cardiovascular: Negative  Skin:        As noted in HPI   Psychiatric/Behavioral: Negative  Objective:    History   Smoking Status    Never Smoker   Smokeless Tobacco    Never Used       Allergies:    Allergies   Allergen Reactions    Amoxicillin-Pot Clavulanate Rash       Vitals:  /90   Pulse 88   Temp 98 °F (36 7 °C)   Resp 16   Ht 5' 9" (1 753 m)   Wt 97 5 kg (215 lb)   BMI 31 75 kg/m²     Current Outpatient Prescriptions   Medication Sig Dispense Refill    SYNTHROID 100 MCG tablet Take 1 tablet (100 mcg total) by mouth daily Name brand medically necessary 90 tablet 1    cetirizine-pseudoephedrine (ZyrTEC-D) 5-120 MG per tablet Take 1 tablet by mouth as needed for allergies   sulfamethoxazole-trimethoprim (BACTRIM DS) 800-160 mg per tablet Take 1 tablet by mouth every 12 (twelve) hours for 10 days 20 tablet 0     No current facility-administered medications for this visit  Physical Exam   Constitutional: He is oriented to person, place, and time  He appears well-developed and well-nourished  Cardiovascular: Normal rate, regular rhythm and normal heart sounds  Pulmonary/Chest: Effort normal and breath sounds normal    Neurological: He is alert and oriented to person, place, and time  Skin: Skin is warm and dry  Psychiatric: He has a normal mood and affect   His behavior is normal  Judgment and thought content normal          PRIMO Allison

## 2018-08-01 NOTE — TELEPHONE ENCOUNTER
Spoke with pt regarding his no show on 8/1/18  Had wrong time documented, made appt with Jennifer for 3pm today

## 2018-08-01 NOTE — PATIENT INSTRUCTIONS
Warm compresses at the site  Take medication with food  It is important that you take the entire course of antibiotics prescribed  May also take a probiotic of your choice to maintain healthy GI kortney  Can take some probiotic and yogurt with the medication  Supportive care discussed and advised  Follow up for no improvement and worsening of conditions  Patient advised and educated when to see immediate medical care  Abscess   WHAT YOU NEED TO KNOW:   A warm compress may help your abscess drain  Your healthcare provider may make a cut in the abscess so it can drain  You may need surgery to remove an abscess that is on your hands or buttocks  DISCHARGE INSTRUCTIONS:   Return to the emergency department if:   · The area around your abscess becomes very painful, warm, or has red streaks  · You have a fever and chills  · Your heart is beating faster than usual      · You feel faint or confused  Contact your healthcare provider if:   · Your abscess gets bigger or does not get better  · Your abscess returns  · You have questions or concerns about your condition or care  Medicines: You may  need any of the following:  · Antibiotics  help treat a bacterial infection  · Acetaminophen  decreases pain and fever  It is available without a doctor's order  Ask how much to take and how often to take it  Follow directions  Acetaminophen can cause liver damage if not taken correctly  · NSAIDs , such as ibuprofen, help decrease swelling, pain, and fever  This medicine is available with or without a doctor's order  NSAIDs can cause stomach bleeding or kidney problems in certain people  If you take blood thinner medicine, always ask your healthcare provider if NSAIDs are safe for you  Always read the medicine label and follow directions  · Take your medicine as directed  Contact your healthcare provider if you think your medicine is not helping or if you have side effects   Tell him or her if you are allergic to any medicine  Keep a list of the medicines, vitamins, and herbs you take  Include the amounts, and when and why you take them  Bring the list or the pill bottles to follow-up visits  Carry your medicine list with you in case of an emergency  Self-care:   · Apply a warm compress to your abscess  This will help it open and drain  Wet a washcloth in warm, but not hot, water  Apply the compress for 10 minutes  Repeat this 4 times each day  Do not  press on an abscess or try to open it with a needle  You may push the bacteria deeper or into your blood  · Do not share your clothes, towels, or sheets with anyone  This can spread the infection to others  · Wash your hands often  This can help prevent the spread of germs  Use soap and water or an alcohol-based hand rub  Care for your wound after it is drained:   · Care for your wound as directed  If your healthcare provider says it is okay, carefully remove the bandage and gauze packing  You may need to soak the gauze to get it out of your wound  Clean your wound and the area around it as directed  Dry the area and put on new, clean bandages  Change your bandages when they get wet or dirty  · Ask your healthcare provider how to change the gauze in your wound  Keep track of how many pieces of gauze are placed inside the wound  Do not put too much packing in the wound  Do not pack the gauze too tightly in your wound  Follow up with your healthcare provider in 1 to 3 days: You may need to have your packing removed or your bandage changed  Write down your questions so you remember to ask them during your visits  © 2017 [employer] [employer] Information is for End User's use only and may not be sold, redistributed or otherwise used for commercial purposes  All illustrations and images included in CareNotes® are the copyrighted property of A D [employer] M [employer] Inc  or [employer]  The above information is an  only   It is not intended as medical advice for individual conditions or treatments  Talk to your doctor, nurse or pharmacist before following any medical regimen to see if it is safe and effective for you

## 2018-08-09 ENCOUNTER — TELEPHONE (OUTPATIENT)
Dept: OBGYN CLINIC | Facility: CLINIC | Age: 48
End: 2018-08-09

## 2018-08-09 NOTE — TELEPHONE ENCOUNTER
14 Randall Calix called and left a message asking for a call back concerning Euflexxa injections      Phone: [phone] option 2 or 3

## 2018-08-20 ENCOUNTER — TELEPHONE (OUTPATIENT)
Dept: RHEUMATOLOGY | Facility: CLINIC | Age: 48
End: 2018-08-20

## 2018-08-20 NOTE — TELEPHONE ENCOUNTER
14 Randall Calix called [facility] and stated that they have not received any prescriptions for the Euflexxa  from our office  The patient stated this has been an on-going issue for over a month now and is in need of these injections ASAP

## 2018-08-22 NOTE — TELEPHONE ENCOUNTER
[facility] to check status - they informed me that they never received the request we sent over on 8/1/18 with fax confirmation  I re-faxed the clinicals today with STAT on the form so that we can get this expedited for the patient

## 2018-09-11 NOTE — TELEPHONE ENCOUNTER
Spoke with patient today and informed him his is injections are set for delivery today from [facility]  I will call him once they come to office

## 2018-09-19 ENCOUNTER — OFFICE VISIT (OUTPATIENT)
Dept: OBGYN CLINIC | Facility: CLINIC | Age: 48
End: 2018-09-19
Payer: COMMERCIAL

## 2018-09-19 DIAGNOSIS — M17.31 POST-TRAUMATIC OSTEOARTHRITIS OF RIGHT KNEE: Primary | ICD-10-CM

## 2018-09-19 PROCEDURE — 20610 DRAIN/INJ JOINT/BURSA W/O US: CPT | Performed by: PHYSICIAN ASSISTANT

## 2018-09-19 RX ORDER — HYALURONATE SODIUM 10 MG/ML
20 SYRINGE (ML) INTRAARTICULAR
Status: COMPLETED | OUTPATIENT
Start: 2018-09-19 | End: 2018-09-19

## 2018-09-19 RX ADMIN — Medication 20 MG: at 17:30

## 2018-09-19 NOTE — PROGRESS NOTES
Assessment/Plan:  No diagnosis found  Follow up 1 week     Subjective:   Ty Hernandez is a 46 y o  male who presents for euflexxa #1 right knee      Review of Systems      Past Medical History:   Diagnosis Date    Pollen allergies        Past Surgical History:   Procedure Laterality Date    APPENDECTOMY  1985    ARTHROSCOPY KNEE Right 6/30/2016    Procedure: ARTHROSCOPY KNEE, MEDIAL MENISCECTOMY;  Surgeon: Shauna Reddy MD;  Location: [facility] MAIN OR;  Service:    [facility] INJECTION ANESTHETIC AGENT TO CERVICAL PLEXUS      KNEE ARTHROSCOPY Right 1990       Family History   Problem Relation Age of Onset    Cancer Mother         breast    Cancer Sister         3 sisters thyroid CA    No Known Problems Father        Social History     Occupational History    Not on file  Social History Main Topics    Smoking status: Never Smoker    Smokeless tobacco: Never Used    Alcohol use Yes      Comment: socially    Drug use: No    Sexual activity: Not on file         Current Outpatient Prescriptions:     cetirizine-pseudoephedrine (ZyrTEC-D) 5-120 MG per tablet, Take 1 tablet by mouth as needed for allergies  , Disp: , Rfl:     SYNTHROID 100 MCG tablet, Take 1 tablet (100 mcg total) by mouth daily Name brand medically necessary, Disp: 90 tablet, Rfl: 1    Allergies   Allergen Reactions    Amoxicillin-Pot Clavulanate Rash       Objective: There were no vitals filed for this visit      Ortho Exam    Physical Exam   Large joint arthrocentesis  Date/Time: 9/19/2018 5:30 PM  Consent given by: patient  Site marked: site marked  Supporting Documentation  Indications: pain   Procedure Details  Location: knee - R knee  Needle size: 22 G  Ultrasound guidance: no  Approach: anterolateral  Medications administered: 20 mg Sodium Hyaluronate 20 MG/2ML    Patient tolerance: patient tolerated the procedure well with no immediate complications  Dressing:  Sterile dressing applied

## 2018-09-26 ENCOUNTER — OFFICE VISIT (OUTPATIENT)
Dept: OBGYN CLINIC | Facility: CLINIC | Age: 48
End: 2018-09-26
Payer: COMMERCIAL

## 2018-09-26 VITALS — HEIGHT: 69 IN | BODY MASS INDEX: 31.7 KG/M2 | WEIGHT: 214 LBS

## 2018-09-26 DIAGNOSIS — M17.31 POST-TRAUMATIC OSTEOARTHRITIS OF RIGHT KNEE: Primary | ICD-10-CM

## 2018-09-26 PROCEDURE — 20610 DRAIN/INJ JOINT/BURSA W/O US: CPT | Performed by: PHYSICIAN ASSISTANT

## 2018-09-26 RX ORDER — HYALURONATE SODIUM 10 MG/ML
20 SYRINGE (ML) INTRAARTICULAR
Status: COMPLETED | OUTPATIENT
Start: 2018-09-26 | End: 2018-09-26

## 2018-09-26 RX ADMIN — Medication 20 MG: at 07:18

## 2018-09-26 NOTE — PROGRESS NOTES
Assessment/Plan:  1  Post-traumatic osteoarthritis of right knee         Follow-up 1 week  Subjective:   Afshan Silveira is a 46 y o  male who presents today for right knee euflexxa #2  Review of Systems      Past Medical History:   Diagnosis Date    Pollen allergies        Past Surgical History:   Procedure Laterality Date    APPENDECTOMY  1985    ARTHROSCOPY KNEE Right 6/30/2016    Procedure: ARTHROSCOPY KNEE, MEDIAL MENISCECTOMY;  Surgeon: Nadya Morejon MD;  Location: [facility] MAIN OR;  Service:    [facility] INJECTION ANESTHETIC AGENT TO CERVICAL PLEXUS      KNEE ARTHROSCOPY Right 1990       Family History   Problem Relation Age of Onset    Cancer Mother         breast    Cancer Sister         3 sisters thyroid CA    No Known Problems Father        Social History     Occupational History    Not on file  Social History Main Topics    Smoking status: Never Smoker    Smokeless tobacco: Never Used    Alcohol use Yes      Comment: socially    Drug use: No    Sexual activity: Not on file         Current Outpatient Prescriptions:     cetirizine-pseudoephedrine (ZyrTEC-D) 5-120 MG per tablet, Take 1 tablet by mouth as needed for allergies  , Disp: , Rfl:     SYNTHROID 100 MCG tablet, Take 1 tablet (100 mcg total) by mouth daily Name brand medically necessary, Disp: 90 tablet, Rfl: 1    Allergies   Allergen Reactions    Amoxicillin-Pot Clavulanate Rash       Objective: There were no vitals filed for this visit      Ortho Exam    Physical Exam    Large joint arthrocentesis  Date/Time: 9/26/2018 7:18 AM  Consent given by: patient  Site marked: site marked  Supporting Documentation  Indications: pain   Procedure Details  Location: knee - R knee  Needle size: 22 G  Ultrasound guidance: no  Approach: anterolateral  Medications administered: 20 mg Sodium Hyaluronate 20 MG/2ML    Patient tolerance: patient tolerated the procedure well with no immediate complications  Dressing:  Sterile dressing applied

## 2018-10-03 ENCOUNTER — OFFICE VISIT (OUTPATIENT)
Dept: OBGYN CLINIC | Facility: CLINIC | Age: 48
End: 2018-10-03
Payer: COMMERCIAL

## 2018-10-03 DIAGNOSIS — M17.31 POST-TRAUMATIC OSTEOARTHRITIS OF RIGHT KNEE: Primary | ICD-10-CM

## 2018-10-03 PROCEDURE — 20610 DRAIN/INJ JOINT/BURSA W/O US: CPT | Performed by: PHYSICIAN ASSISTANT

## 2018-10-03 RX ORDER — HYALURONATE SODIUM 10 MG/ML
20 SYRINGE (ML) INTRAARTICULAR
Status: COMPLETED | OUTPATIENT
Start: 2018-10-03 | End: 2018-10-03

## 2018-10-03 RX ADMIN — Medication 20 MG: at 07:41

## 2018-10-03 NOTE — PROGRESS NOTES
Assessment/Plan:  1  Post-traumatic osteoarthritis of right knee         Follow-up prn  Subjective:   Reva Wilson is a 46 y o  male who presents today for euflexxa #3 right knee  Review of Systems      Past Medical History:   Diagnosis Date    Pollen allergies        Past Surgical History:   Procedure Laterality Date    APPENDECTOMY  1985    ARTHROSCOPY KNEE Right 6/30/2016    Procedure: ARTHROSCOPY KNEE, MEDIAL MENISCECTOMY;  Surgeon: Yeni Johnson MD;  Location: [place] [address] MAIN OR;  Service:    [facility] INJECTION ANESTHETIC AGENT TO CERVICAL PLEXUS      KNEE ARTHROSCOPY Right 1990       Family History   Problem Relation Age of Onset    Cancer Mother         breast    Cancer Sister         3 sisters thyroid CA    No Known Problems Father        Social History     Occupational History    Not on file  Social History Main Topics    Smoking status: Never Smoker    Smokeless tobacco: Never Used    Alcohol use Yes      Comment: socially    Drug use: No    Sexual activity: Not on file         Current Outpatient Prescriptions:     cetirizine-pseudoephedrine (ZyrTEC-D) 5-120 MG per tablet, Take 1 tablet by mouth as needed for allergies  , Disp: , Rfl:     SYNTHROID 100 MCG tablet, Take 1 tablet (100 mcg total) by mouth daily Name brand medically necessary, Disp: 90 tablet, Rfl: 1    Allergies   Allergen Reactions    Amoxicillin-Pot Clavulanate Rash       Objective: There were no vitals filed for this visit      Ortho Exam    Physical Exam    Large joint arthrocentesis  Date/Time: 10/3/2018 7:41 AM  Consent given by: patient  Site marked: site marked  Supporting Documentation  Indications: pain   Procedure Details  Location: knee - R knee  Needle size: 22 G  Ultrasound guidance: no  Approach: anterolateral  Medications administered: 20 mg Sodium Hyaluronate 20 MG/2ML    Patient tolerance: patient tolerated the procedure well with no immediate complications  Dressing:  Sterile dressing applied

## 2018-10-08 DIAGNOSIS — E03.9 PRIMARY HYPOTHYROIDISM: ICD-10-CM

## 2018-10-08 RX ORDER — LEVOTHYROXINE SODIUM 100 MCG
TABLET ORAL
Qty: 90 TABLET | Refills: 1 | Status: SHIPPED | OUTPATIENT
Start: 2018-10-08 | End: 2018-11-20 | Stop reason: SDUPTHER

## 2018-11-12 DIAGNOSIS — E03.9 PRIMARY HYPOTHYROIDISM: ICD-10-CM

## 2018-11-12 RX ORDER — LEVOTHYROXINE SODIUM 100 MCG
100 TABLET ORAL DAILY
Qty: 90 TABLET | Refills: 0 | Status: CANCELLED | OUTPATIENT
Start: 2018-11-12

## 2018-11-12 NOTE — TELEPHONE ENCOUNTER
Patient have not had the blood work done since 2016 and have not seen Dr Bon Baker since nov, 2017 and I received refill request for levothyroxine as Dr Shantel Beltran is not in today  Please call patient for follow up with Dr Bon Baker, so we can repeat labs and do medications as needed   Thanks

## 2018-11-20 ENCOUNTER — TELEPHONE (OUTPATIENT)
Dept: FAMILY MEDICINE CLINIC | Facility: CLINIC | Age: 48
End: 2018-11-20

## 2018-11-20 DIAGNOSIS — E03.9 PRIMARY HYPOTHYROIDISM: Primary | ICD-10-CM

## 2018-11-20 RX ORDER — LEVOTHYROXINE SODIUM 100 MCG
100 TABLET ORAL DAILY
Qty: 90 TABLET | Refills: 0 | Status: SHIPPED | OUTPATIENT
Start: 2018-11-20 | End: 2019-02-01 | Stop reason: SDUPTHER

## 2018-11-20 NOTE — TELEPHONE ENCOUNTER
Pt called to question why his [facility]  I saw a previous message that we have not had bw for his thyroid since 2016 and no appt since 2017  Pt stated nothing has changed, why does he need the blood work  Pt did schedule an appt for 1/2019  He said he will get blood work if we order tsh and then can refill after blood work to hold him over until Víctor appt  He would like a call when blood work is ordered

## 2018-11-20 NOTE — TELEPHONE ENCOUNTER
New order for thyroid labs printed and put in clerical inbasket, please let him know I sent in his refill to Mela, DO

## 2019-01-08 ENCOUNTER — TELEPHONE (OUTPATIENT)
Dept: FAMILY MEDICINE CLINIC | Facility: CLINIC | Age: 49
End: 2019-01-08

## 2019-02-01 DIAGNOSIS — E03.9 PRIMARY HYPOTHYROIDISM: ICD-10-CM

## 2019-02-01 NOTE — TELEPHONE ENCOUNTER
Please ask Audrey Dukes to schedule a follow up appointment and he also needs lab work  Once he schedules I will refill his thyroid medication    Lin Moreno, DO

## 2019-02-04 RX ORDER — LEVOTHYROXINE SODIUM 100 MCG
100 TABLET ORAL DAILY
Qty: 90 TABLET | Refills: 0 | Status: SHIPPED | OUTPATIENT
Start: 2019-02-04 | End: 2019-05-02 | Stop reason: SDUPTHER

## 2019-02-07 ENCOUNTER — OFFICE VISIT (OUTPATIENT)
Dept: FAMILY MEDICINE CLINIC | Facility: CLINIC | Age: 49
End: 2019-02-07
Payer: COMMERCIAL

## 2019-02-07 VITALS
HEIGHT: 69 IN | RESPIRATION RATE: 18 BRPM | HEART RATE: 72 BPM | BODY MASS INDEX: 31.46 KG/M2 | WEIGHT: 212.4 LBS | DIASTOLIC BLOOD PRESSURE: 82 MMHG | SYSTOLIC BLOOD PRESSURE: 132 MMHG | TEMPERATURE: 97.1 F

## 2019-02-07 DIAGNOSIS — Z12.5 PROSTATE CANCER SCREENING: ICD-10-CM

## 2019-02-07 DIAGNOSIS — E03.9 PRIMARY HYPOTHYROIDISM: Primary | ICD-10-CM

## 2019-02-07 DIAGNOSIS — Z13.6 SCREENING FOR CARDIOVASCULAR CONDITION: ICD-10-CM

## 2019-02-07 DIAGNOSIS — Z12.11 COLON CANCER SCREENING: ICD-10-CM

## 2019-02-07 DIAGNOSIS — Z79.899 ENCOUNTER FOR LONG-TERM CURRENT USE OF MEDICATION: ICD-10-CM

## 2019-02-07 PROBLEM — D17.1 LIPOMA OF TORSO: Status: ACTIVE | Noted: 2019-02-07

## 2019-02-07 PROCEDURE — 99213 OFFICE O/P EST LOW 20 MIN: CPT | Performed by: FAMILY MEDICINE

## 2019-02-07 PROCEDURE — 1036F TOBACCO NON-USER: CPT | Performed by: FAMILY MEDICINE

## 2019-02-07 PROCEDURE — 3008F BODY MASS INDEX DOCD: CPT | Performed by: FAMILY MEDICINE

## 2019-02-07 NOTE — PROGRESS NOTES
Assessment/Plan:    Problem List Items Addressed This Visit     Primary hypothyroidism - Primary     Symptoms are stable  Will check labs          Relevant Orders    TSH, 3rd generation    T4, free      Other Visit Diagnoses     Screening for cardiovascular condition        Relevant Orders    Comprehensive metabolic panel    Lipid Panel with Direct LDL reflex    Prostate cancer screening        Relevant Orders    PSA (Reflex To Free) (Serial)    Colon cancer screening        Relevant Orders    Ambulatory referral to Gastroenterology    Encounter for long-term current use of medication        Relevant Orders    CBC          There are no Patient Instructions on file for this visit  Return in about 1 year (around 2/7/2020) for Annual physical     Subjective:      Patient ID: Blanka Tomas is a 46 y o  male  Chief Complaint   Patient presents with    Hyperlipidemia    Hypothyroidism    bump on back     pt believes muscular  [facility] l[facility]       He has retired  He is going to drive a school bus  Hypothyroidism - energy level has been good  The following portions of the patient's history were reviewed and updated as appropriate:  past social history    Review of Systems      Current Outpatient Prescriptions   Medication Sig Dispense Refill    SYNTHROID 100 MCG tablet Take 1 tablet (100 mcg total) by mouth daily Name brand medically necessary 90 tablet 0    cetirizine-pseudoephedrine (ZyrTEC-D) 5-120 MG per tablet Take 1 tablet by mouth as needed for allergies  No current facility-administered medications for this visit  Objective:    /82   Pulse 72   Temp (!) 97 1 °F (36 2 °C)   Resp 18   Ht 5' 9" (1 753 m)   Wt 96 3 kg (212 lb 6 4 oz)   BMI 31 37 kg/m²        Physical Exam   Constitutional: He appears well-developed and well-nourished  HENT:   Head: Normocephalic and atraumatic     Right Ear: External ear normal    Left Ear: External ear normal    Mouth/Throat: Oropharynx is clear and moist    Cardiovascular: Normal rate, regular rhythm and normal heart sounds  No murmur heard  Pulmonary/Chest: Effort normal and breath sounds normal  No respiratory distress  He has no wheezes  He has no rales  Musculoskeletal: He exhibits no edema or tenderness  Skin:   Mobile lump on back   Nursing note and vitals reviewed               Ranjeet DO Renato

## 2019-02-19 ENCOUNTER — TELEPHONE (OUTPATIENT)
Dept: FAMILY MEDICINE CLINIC | Facility: CLINIC | Age: 49
End: 2019-02-19

## 2019-02-19 DIAGNOSIS — R97.20 ELEVATED PSA: Primary | ICD-10-CM

## 2019-02-19 NOTE — TELEPHONE ENCOUNTER
2/19/2019 12:36 PM spoke with patient regarding his abnormal lab results  His PSA is mildly elevated at 4 05  He agreed to follow up with a urologist for further evaluation  Information given for Dr Luna Kidney       We also reviewed his other lab results which are normal     Onofre Nicholson DO

## 2019-02-22 ENCOUNTER — TELEPHONE (OUTPATIENT)
Dept: GASTROENTEROLOGY | Facility: AMBULARY SURGERY CENTER | Age: 49
End: 2019-02-22

## 2019-02-22 ENCOUNTER — TELEPHONE (OUTPATIENT)
Dept: UROLOGY | Facility: MEDICAL CENTER | Age: 49
End: 2019-02-22

## 2019-02-22 NOTE — TELEPHONE ENCOUNTER
[facility]

## 2019-02-22 NOTE — TELEPHONE ENCOUNTER
Reason for appointment/Complaint/Diagnosis :  Elevated PSA     Insurance: [employer] [facility]     History of Cancer? no                       If yes, what kind? Not applicable    Previous urologist?     None                  Records requested/where? Labs in [employer] tab    Outside testing/where? Not applicable    Location Preference for office visit?  [place]

## 2019-02-22 NOTE — TELEPHONE ENCOUNTER
02/22/19  Screened by: Earline Meyer    Referring Provider     Pre- Screening: There is no height or weight on file to calculate BMI  Has patient been referred for a routine screening Colonoscopy? yes  Is the patient between 39-70 years old? yes    SCHEDULING STAFF   If the patient is between 45yrs-49yrs, please advise patient to confirm benefits/coverage with their insurance company for a routine screening colonoscopy, some insurance carriers will only cover at [phone] or older   If the patient is over 66years old, please schedule an office visit  Do you have any of the following symptoms? Have you had a coronary or vascular stent within the last year? no    Have you had a heart attack or stroke in the last 6 months? no    Have you had intestinal surgery in the last 3 months? no    Do you have problems with:     Do you use:     Have you been hospitalized in the last Month? no    Have you been diagnosed with a bleeding disorder or anemia? no    Have you had chest pain (angina) or breathing problems  (COPD) in the last 3 months? no    Do you have any difficulty walking up a flight of stairs? no    Have you had Kidney failure or insufficiency? no    Have you had heart valve surgery? no    Are you confined to a wheelchair? no    Do you take     Have you been diagnosed with Diabetes or are you taking any   Diabetic medications? no    : If patient answers NO to medical questions, then schedule procedure  If patient answers YES to medical questions, then schedule office appointment  Previous Colonoscopy no  Date and Facility of last colonoscopy?      Comments: [facility]

## 2019-03-18 ENCOUNTER — TELEPHONE (OUTPATIENT)
Dept: GASTROENTEROLOGY | Facility: AMBULARY SURGERY CENTER | Age: 49
End: 2019-03-18

## 2019-03-18 DIAGNOSIS — Z12.11 COLON CANCER SCREENING: Primary | ICD-10-CM

## 2019-03-18 NOTE — TELEPHONE ENCOUNTER
Patient is scheduled for a colonoscopy on 4/22/19 with dr Nigel Newell at [place] [id number] instructions provided at check out   Mailed instructions on 3/18/19

## 2019-04-03 ENCOUNTER — OFFICE VISIT (OUTPATIENT)
Dept: UROLOGY | Facility: AMBULATORY SURGERY CENTER | Age: 49
End: 2019-04-03
Payer: COMMERCIAL

## 2019-04-03 VITALS
HEART RATE: 74 BPM | WEIGHT: 210 LBS | BODY MASS INDEX: 31.1 KG/M2 | HEIGHT: 69 IN | SYSTOLIC BLOOD PRESSURE: 118 MMHG | DIASTOLIC BLOOD PRESSURE: 72 MMHG

## 2019-04-03 DIAGNOSIS — R97.20 ELEVATED PSA: ICD-10-CM

## 2019-04-03 PROCEDURE — 99244 OFF/OP CNSLTJ NEW/EST MOD 40: CPT | Performed by: UROLOGY

## 2019-04-03 RX ORDER — CIPROFLOXACIN 500 MG/1
500 TABLET, FILM COATED ORAL 2 TIMES DAILY
Qty: 6 TABLET | Refills: 0 | Status: SHIPPED | OUTPATIENT
Start: 2019-04-03 | End: 2019-04-06

## 2019-04-08 ENCOUNTER — ANESTHESIA EVENT (OUTPATIENT)
Dept: PERIOP | Facility: AMBULARY SURGERY CENTER | Age: 49
End: 2019-04-08
Payer: COMMERCIAL

## 2019-04-22 ENCOUNTER — ANESTHESIA (OUTPATIENT)
Dept: PERIOP | Facility: AMBULARY SURGERY CENTER | Age: 49
End: 2019-04-22
Payer: COMMERCIAL

## 2019-04-22 ENCOUNTER — HOSPITAL ENCOUNTER (OUTPATIENT)
Facility: AMBULARY SURGERY CENTER | Age: 49
Setting detail: OUTPATIENT SURGERY
Discharge: HOME | End: 2019-04-22
Attending: INTERNAL MEDICINE | Admitting: INTERNAL MEDICINE
Payer: COMMERCIAL

## 2019-04-22 VITALS
RESPIRATION RATE: 18 BRPM | TEMPERATURE: 97.6 F | OXYGEN SATURATION: 96 % | DIASTOLIC BLOOD PRESSURE: 75 MMHG | WEIGHT: 206 LBS | HEART RATE: 66 BPM | HEIGHT: 68 IN | SYSTOLIC BLOOD PRESSURE: 115 MMHG | BODY MASS INDEX: 31.22 KG/M2

## 2019-04-22 DIAGNOSIS — Z12.11 SCREEN FOR COLON CANCER: ICD-10-CM

## 2019-04-22 PROCEDURE — 88305 TISSUE EXAM BY PATHOLOGIST: CPT | Performed by: PATHOLOGY

## 2019-04-22 PROCEDURE — 45385 COLONOSCOPY W/LESION REMOVAL: CPT | Performed by: INTERNAL MEDICINE

## 2019-04-22 PROCEDURE — 45380 COLONOSCOPY AND BIOPSY: CPT | Performed by: INTERNAL MEDICINE

## 2019-04-22 RX ORDER — PROPOFOL 10 MG/ML
INJECTION, EMULSION INTRAVENOUS AS NEEDED
Status: DISCONTINUED | OUTPATIENT
Start: 2019-04-22 | End: 2019-04-22 | Stop reason: SURG

## 2019-04-22 RX ORDER — LIDOCAINE HYDROCHLORIDE 10 MG/ML
INJECTION, SOLUTION INFILTRATION; PERINEURAL AS NEEDED
Status: DISCONTINUED | OUTPATIENT
Start: 2019-04-22 | End: 2019-04-22 | Stop reason: SURG

## 2019-04-22 RX ORDER — SODIUM CHLORIDE 9 MG/ML
125 INJECTION, SOLUTION INTRAVENOUS CONTINUOUS
Status: DISCONTINUED | OUTPATIENT
Start: 2019-04-22 | End: 2019-04-22 | Stop reason: HOSPADM

## 2019-04-22 RX ADMIN — SODIUM CHLORIDE 125 ML/HR: 0.9 INJECTION, SOLUTION INTRAVENOUS at 10:39

## 2019-04-22 RX ADMIN — PROPOFOL 50 MG: 10 INJECTION, EMULSION INTRAVENOUS at 10:50

## 2019-04-22 RX ADMIN — PROPOFOL 150 MG: 10 INJECTION, EMULSION INTRAVENOUS at 10:47

## 2019-04-22 RX ADMIN — PROPOFOL 50 MG: 10 INJECTION, EMULSION INTRAVENOUS at 10:56

## 2019-04-22 RX ADMIN — PROPOFOL 50 MG: 10 INJECTION, EMULSION INTRAVENOUS at 10:53

## 2019-04-22 RX ADMIN — LIDOCAINE HYDROCHLORIDE ANHYDROUS 50 MG: 10 INJECTION, SOLUTION INFILTRATION at 10:47

## 2019-04-22 RX ADMIN — PROPOFOL 50 MG: 10 INJECTION, EMULSION INTRAVENOUS at 10:49

## 2019-04-22 RX ADMIN — PROPOFOL 50 MG: 10 INJECTION, EMULSION INTRAVENOUS at 10:55

## 2019-04-25 ENCOUNTER — TELEPHONE (OUTPATIENT)
Dept: GASTROENTEROLOGY | Facility: AMBULARY SURGERY CENTER | Age: 49
End: 2019-04-25

## 2019-05-02 DIAGNOSIS — E03.9 PRIMARY HYPOTHYROIDISM: ICD-10-CM

## 2019-05-02 RX ORDER — LEVOTHYROXINE SODIUM 100 MCG
TABLET ORAL
Qty: 90 TABLET | Refills: 0 | Status: SHIPPED | OUTPATIENT
Start: 2019-05-02 | End: 2019-08-13 | Stop reason: SDUPTHER

## 2019-06-19 ENCOUNTER — TELEPHONE (OUTPATIENT)
Dept: UROLOGY | Facility: MEDICAL CENTER | Age: 49
End: 2019-06-19

## 2019-06-27 ENCOUNTER — TELEPHONE (OUTPATIENT)
Dept: UROLOGY | Facility: AMBULATORY SURGERY CENTER | Age: 49
End: 2019-06-27

## 2019-06-27 NOTE — TELEPHONE ENCOUNTER
Patient no showed for prostate biopsy on 6/25/19 with Dr Wale Foote  Left message for patient to return call to reschedule  Follow up appointment on 7/9/19 cancelled

## 2019-06-27 NOTE — TELEPHONE ENCOUNTER
Returned call to patient  Discussed PSA results  Patient would like to reschedule prostate biopsy (no show on 6/25)  Advised patient that I will send this message to supervisor to assist in finding an appointment for this  Someone from office will call him back  Patient verbalized understanding

## 2019-06-27 NOTE — TELEPHONE ENCOUNTER
Patient called back to find out what his PSA results were and whether he needed to have his prostate biopsy  Please call back

## 2019-07-03 NOTE — TELEPHONE ENCOUNTER
Holding 7/23/19 8:00am in [place] with Dr Junior Gil for procedure, 8/7/19 3:30pm in [place] for results , please offer   Thank you

## 2019-07-05 NOTE — TELEPHONE ENCOUNTER
Spoke with patient, confirmed both appointments and mailed prep/expectation for trus bx as well as copy of AVS from prior visit with updated appointment information on it to patient's updated address that I changed in account

## 2019-07-19 ENCOUNTER — TELEPHONE (OUTPATIENT)
Dept: UROLOGY | Facility: AMBULATORY SURGERY CENTER | Age: 49
End: 2019-07-19

## 2019-07-23 ENCOUNTER — DOCUMENTATION (OUTPATIENT)
Dept: UROLOGY | Facility: AMBULATORY SURGERY CENTER | Age: 49
End: 2019-07-23

## 2019-07-23 ENCOUNTER — PROCEDURE VISIT (OUTPATIENT)
Dept: UROLOGY | Facility: AMBULATORY SURGERY CENTER | Age: 49
End: 2019-07-23
Payer: COMMERCIAL

## 2019-07-23 VITALS
HEART RATE: 60 BPM | SYSTOLIC BLOOD PRESSURE: 122 MMHG | HEIGHT: 68 IN | WEIGHT: 211 LBS | DIASTOLIC BLOOD PRESSURE: 80 MMHG | BODY MASS INDEX: 31.98 KG/M2

## 2019-07-23 DIAGNOSIS — R97.20 ELEVATED PSA: Primary | ICD-10-CM

## 2019-07-23 PROCEDURE — 76872 US TRANSRECTAL: CPT | Performed by: UROLOGY

## 2019-07-23 PROCEDURE — 76942 ECHO GUIDE FOR BIOPSY: CPT | Performed by: UROLOGY

## 2019-07-23 PROCEDURE — G0416 PROSTATE BIOPSY, ANY MTHD: HCPCS | Performed by: PATHOLOGY

## 2019-07-23 PROCEDURE — 55700 PR BIOPSY OF PROSTATE,NEEDLE/PUNCH: CPT | Performed by: UROLOGY

## 2019-07-23 PROCEDURE — 88344 IMHCHEM/IMCYTCHM EA MLT ANTB: CPT | Performed by: PATHOLOGY

## 2019-07-23 NOTE — PROGRESS NOTES
Biopsy prostate     Date/Time 7/23/2019 8:44 AM     Performed by  Charanjit Cardona MD     Authorized by Charanjit Cardona MD            Jean-Claude Beck is a 57-year-old male with a PSA of 4 24  He presents to undergo transrectal ultrasound-guided biopsy of the prostate  Prostate Biopsy note: The patient returns to the office today to undergo a transrectal ultrasound-guided biopsy of the prostate secondary to an elevated PSA  Risk and benefits of the procedure were discussed  Informed consent was obtained  The patient's prebiopsy preparation was deemed to be adequate  Antibiotics had been taken as prescribed  If appropriate blood thinners had been placed on hold  The patient completed an enema as prescribed  The patient was placed in the lateral decubitus position  Digital rectal examination was performed revealing a 35 gram prostate  Viscous lidocaine jelly was instilled into the rectum  Transrectal ultrasonography was then performed  The prostate measured 40 grams  5 cc of 2% lidocaine were then injected bilaterally between the junction of the base of the prostate and the seminal vesicles  Ultrasound guidance was utilized to place the needle into proper position for the administration of the local anesthetic  A standard 12 core biopsy was then performed  4 cores were taken from the right peripheral zone  2 cores were taken from the right central zone  4 cores were taken from the left peripheral zone  2 cores were taken from the left central zone  Overall the patient tolerated the procedure and there were no complications  My overall impression status post transrectal ultrasound and biopsy the prostate secondary to an elevated PSA  I have recommended rest and completing antibiotics  Possible postbiopsy sequelae were discussed including hematuria and hematospermia  I've asked that he return in the next one to 2 weeks to review the results of the biopsy

## 2019-08-07 ENCOUNTER — OFFICE VISIT (OUTPATIENT)
Dept: UROLOGY | Facility: AMBULATORY SURGERY CENTER | Age: 49
End: 2019-08-07

## 2019-08-07 VITALS — BODY MASS INDEX: 31.98 KG/M2 | HEIGHT: 68 IN | WEIGHT: 211 LBS

## 2019-08-07 DIAGNOSIS — R97.20 ELEVATED PSA: Primary | ICD-10-CM

## 2019-08-07 PROCEDURE — 99213 OFFICE O/P EST LOW 20 MIN: CPT | Performed by: UROLOGY

## 2019-08-07 NOTE — PROGRESS NOTES
8/7/2019    Bryant Rosenberg  1970  [id number]        Assessment  Elevated PSA, negative transrectal ultrasound-guided biopsy of the prostate      Discussion  We discussed his prostate biopsy results in the office today  We did discuss the risk of a false negative biopsy  I recommend follow-up in 6 months with a PSA  Assuming that the PSA remains stable he can then follow on a yearly basis with PSA and digital rectal examinations  History of Present Illness  46 y o  male with a history of an elevated PSA of 4 24  At 46years of age this prompted a transrectal ultrasound-guided biopsy of the prostate  This was recently performed and returns in follow-up to the office today  Fortunately the biopsy is negative for prostate cancer  AUA Symptom Score      Review of Systems  Review of Systems   Constitutional: Negative  HENT: Negative  Eyes: Negative  Respiratory: Negative  Cardiovascular: Negative  Gastrointestinal: Negative  Endocrine: Negative  Genitourinary: Negative  Musculoskeletal: Negative  Skin: Negative  Allergic/Immunologic: Negative  Neurological: Negative  Hematological: Negative  Psychiatric/Behavioral: Negative  Past Medical History  Past Medical History:   Diagnosis Date    Hypothyroid     Pollen allergies        Past Social History  Past Surgical History:   Procedure Laterality Date    APPENDECTOMY  1985    ARTHROSCOPY KNEE Right 6/30/2016    Procedure: ARTHROSCOPY KNEE, MEDIAL MENISCECTOMY;  Surgeon: James Byrne MD;  [facility] MAIN OR;  Service:     INJECTION ANESTHETIC AGENT TO CERVICAL PLEXUS      KNEE ARTHROSCOPY Right 1990    RI COLONOSCOPY FLX DX W/COLLJ [address] [place] [address] WHEN PFRMD N/A 4/22/2019    Procedure: COLONOSCOPY;  Surgeon: Nadine Jay MD;  Location[facility]  GI [facility];   Service: Gastroenterology       Past Family History  Family History   Problem Relation Age of Onset   [facility] Breast cancer Mother     Cancer Sister 3 sisters thyroid CA    No Known Problems Father     Hashimoto's thyroiditis Sister        Past Social history  Social History     Socioeconomic History    Marital status: /Civil Union     Spouse name: Not on file    Number of children: Not on file    Years of education: Not on file    Highest education level: Not on file   Occupational History    Not on file   Social Needs    Financial resource strain: Not on file    Food insecurity:     Worry: Not on file     Inability: Not on file    Transportation needs:     Medical: Not on file     Non-medical: Not on file   Tobacco Use    Smoking status: Never Smoker    Smokeless tobacco: Never Used   Substance and Sexual Activity    Alcohol use: Yes     Alcohol/week: 8 0 standard drinks     Types: 8 Cans of beer per week     Frequency: 2-3 times a week     Drinks per session: 3 or 4     Comment: socially    Drug use: No    Sexual activity: Not on file   Lifestyle    Physical activity:     Days per week: Not on file     Minutes per session: Not on file    Stress: Not on file   Relationships    Social connections:     Talks on phone: Not on file     Gets together: Not on file     Attends Anabaptist service: Not on file     Active member of club or organization: Not on file     Attends meetings of clubs or organizations: Not on file     Relationship status: Not on file    Intimate partner violence:     Fear of current or ex partner: Not on file     Emotionally abused: Not on file     Physically abused: Not on file     Forced sexual activity: Not on file   Other Topics Concern    Not on file   Social History Narrative    Not on file       Current Medications  Current Outpatient Medications   Medication Sig Dispense Refill    cetirizine-pseudoephedrine (ZyrTEC-D) 5-120 MG per tablet Take 1 tablet by mouth as needed for allergies        SYNTHROID 100 MCG tablet TAKE 1 TABLET DAILY 90 tablet 0     No current facility-administered medications for this visit  Allergies  Allergies   Allergen Reactions    Penicillins     Amoxicillin-Pot Clavulanate Rash       Past Medical History, Social History, Family History, medications and allergies were reviewed  Vitals  Vitals:    08/07/19 1843   Weight: 95 7 kg (211 lb)   Height: 5' 8" (1 727 m)       Physical Exam  Physical Exam    On examination he is in no acute distress  Gait normal   Affect normal    Results  No results found for: PSA  Lab Results   Component Value Date    GLUCOSE 92 09/02/2016    CALCIUM 9 2 09/02/2016     09/02/2016    K 5 0 09/02/2016    CO2 23 09/02/2016    CL 97 09/02/2016    BUN 17 09/02/2016    CREATININE 1 27 09/02/2016     No results found for: WBC, HGB, HCT, MCV, PLT      Office Urine Dip  No results found for this or any previous visit (from the past 1 hour(s))  ]      Total visit time was 15 minutes of which over 50% was spent on counseling

## 2019-08-08 ENCOUNTER — TELEPHONE (OUTPATIENT)
Dept: UROLOGY | Facility: AMBULATORY SURGERY CENTER | Age: 49
End: 2019-08-08

## 2019-08-08 NOTE — TELEPHONE ENCOUNTER
Due to power outage patient could not be checked out yesterday  Plan = Return in about 6 months (around 2/7/2020) for Six months with PSA

## 2019-08-12 NOTE — TELEPHONE ENCOUNTER
Left second message  Gave appointment details and stated that if this day/time does not work he can call to reschedule but I will mail out PSA script and appointment card for now

## 2019-08-13 DIAGNOSIS — E03.9 PRIMARY HYPOTHYROIDISM: ICD-10-CM

## 2019-08-13 RX ORDER — LEVOTHYROXINE SODIUM 100 MCG
TABLET ORAL
Qty: 90 TABLET | Refills: 1 | Status: SHIPPED | OUTPATIENT
Start: 2019-08-13 | End: 2019-12-20 | Stop reason: SDUPTHER

## 2019-12-20 DIAGNOSIS — E03.9 PRIMARY HYPOTHYROIDISM: ICD-10-CM

## 2019-12-20 RX ORDER — LEVOTHYROXINE SODIUM 100 MCG
TABLET ORAL
Qty: 90 TABLET | Refills: 0 | Status: SHIPPED | OUTPATIENT
Start: 2019-12-20 | End: 2020-01-23 | Stop reason: SDUPTHER

## 2019-12-20 NOTE — TELEPHONE ENCOUNTER
I refilled the patient's medication, but they are due for an appointment  Please ask them to schedule  Also he needs his lab work done      Thank you,  Jose C Rudd, DO

## 2020-01-06 LAB
ALBUMIN SERPL-MCNC: 4.8 G/DL (ref 3.5–5.5)
ALBUMIN/GLOB SERPL: 2.4 {RATIO} (ref 1.2–2.2)
ALP SERPL-CCNC: 52 IU/L (ref 39–117)
ALT SERPL-CCNC: 24 IU/L (ref 0–44)
AST SERPL-CCNC: 22 IU/L (ref 0–40)
BASOPHILS # BLD AUTO: 0 X10E3/UL (ref 0–0.2)
BASOPHILS NFR BLD AUTO: 0 %
BILIRUB SERPL-MCNC: 0.8 MG/DL (ref 0–1.2)
BUN SERPL-MCNC: 23 MG/DL (ref 6–24)
BUN/CREAT SERPL: 16 (ref 9–20)
CALCIUM SERPL-MCNC: 9.8 MG/DL (ref 8.7–10.2)
CHLORIDE SERPL-SCNC: 99 MMOL/L (ref 96–106)
CHOLEST SERPL-MCNC: 185 MG/DL (ref 100–199)
CO2 SERPL-SCNC: 23 MMOL/L (ref 20–29)
CREAT SERPL-MCNC: 1.42 MG/DL (ref 0.76–1.27)
EOSINOPHIL # BLD AUTO: 0.1 X10E3/UL (ref 0–0.4)
EOSINOPHIL NFR BLD AUTO: 1 %
ERYTHROCYTE [DISTWIDTH] IN BLOOD BY AUTOMATED COUNT: 13.1 % (ref 12.3–15.4)
GLOBULIN SER-MCNC: 2 G/DL (ref 1.5–4.5)
GLUCOSE SERPL-MCNC: 99 MG/DL (ref 65–99)
HCT VFR BLD AUTO: 51 % (ref 37.5–51)
HDLC SERPL-MCNC: 54 MG/DL
HGB BLD-MCNC: 18 G/DL (ref 13–17.7)
IMM GRANULOCYTES # BLD: 0 X10E3/UL (ref 0–0.1)
IMM GRANULOCYTES NFR BLD: 0 %
LDLC SERPL CALC-MCNC: 118 MG/DL (ref 0–99)
LYMPHOCYTES # BLD AUTO: 2.4 X10E3/UL (ref 0.7–3.1)
LYMPHOCYTES NFR BLD AUTO: 35 %
MCH RBC QN AUTO: 34.2 PG (ref 26.6–33)
MCHC RBC AUTO-ENTMCNC: 35.3 G/DL (ref 31.5–35.7)
MCV RBC AUTO: 97 FL (ref 79–97)
MICRODELETION SYND BLD/T FISH: NORMAL
MICRODELETION SYND BLD/T FISH: NORMAL
MONOCYTES # BLD AUTO: 0.6 X10E3/UL (ref 0.1–0.9)
MONOCYTES NFR BLD AUTO: 8 %
NEUTROPHILS # BLD AUTO: 3.7 X10E3/UL (ref 1.4–7)
NEUTROPHILS NFR BLD AUTO: 56 %
PLATELET # BLD AUTO: 191 X10E3/UL (ref 150–450)
POTASSIUM SERPL-SCNC: 4.9 MMOL/L (ref 3.5–5.2)
PROT SERPL-MCNC: 6.8 G/DL (ref 6–8.5)
PSA SERPL-MCNC: 4 NG/ML (ref 0–4)
RBC # BLD AUTO: 5.27 X10E6/UL (ref 4.14–5.8)
SL AMB EGFR AFRICAN AMERICAN: 66 ML/MIN/1.73
SL AMB EGFR NON AFRICAN AMERICAN: 57 ML/MIN/1.73
SL AMB PDF IMAGE: NORMAL
SODIUM SERPL-SCNC: 137 MMOL/L (ref 134–144)
T4 FREE SERPL-MCNC: 1.52 NG/DL (ref 0.82–1.77)
TRIGL SERPL-MCNC: 63 MG/DL (ref 0–149)
TSH SERPL DL<=0.005 MIU/L-ACNC: 1.25 UIU/ML (ref 0.45–4.5)
WBC # BLD AUTO: 6.8 X10E3/UL (ref 3.4–10.8)

## 2020-01-20 ENCOUNTER — TELEPHONE (OUTPATIENT)
Dept: FAMILY MEDICINE CLINIC | Facility: CLINIC | Age: 50
End: 2020-01-20

## 2020-01-23 ENCOUNTER — OFFICE VISIT (OUTPATIENT)
Dept: FAMILY MEDICINE CLINIC | Facility: CLINIC | Age: 50
End: 2020-01-23
Payer: COMMERCIAL

## 2020-01-23 VITALS
SYSTOLIC BLOOD PRESSURE: 110 MMHG | TEMPERATURE: 97.2 F | DIASTOLIC BLOOD PRESSURE: 62 MMHG | HEART RATE: 82 BPM | RESPIRATION RATE: 18 BRPM | WEIGHT: 211 LBS | HEIGHT: 68 IN | OXYGEN SATURATION: 98 % | BODY MASS INDEX: 31.98 KG/M2

## 2020-01-23 DIAGNOSIS — E03.9 PRIMARY HYPOTHYROIDISM: Primary | ICD-10-CM

## 2020-01-23 DIAGNOSIS — R79.89 ELEVATED SERUM CREATININE: ICD-10-CM

## 2020-01-23 PROCEDURE — 3008F BODY MASS INDEX DOCD: CPT | Performed by: FAMILY MEDICINE

## 2020-01-23 PROCEDURE — 99213 OFFICE O/P EST LOW 20 MIN: CPT | Performed by: FAMILY MEDICINE

## 2020-01-23 PROCEDURE — 1036F TOBACCO NON-USER: CPT | Performed by: FAMILY MEDICINE

## 2020-01-23 RX ORDER — LEVOTHYROXINE SODIUM 100 MCG
100 TABLET ORAL DAILY
Qty: 90 TABLET | Refills: 3 | Status: SHIPPED | OUTPATIENT
Start: 2020-01-23 | End: 2021-03-28

## 2020-01-23 NOTE — PROGRESS NOTES
Assessment/Plan:    1  Primary hypothyroidism  -     SYNTHROID 100 MCG tablet; Take 1 tablet (100 mcg total) by mouth daily    2  Elevated serum creatinine  Comments:  new, will recheck labs in about 1 month with drinking water before hand  Advised to stop protein shakes  Orders:  -     Basic metabolic panel; Future      BMI Counseling: Body mass index is 32 08 kg/m²  The BMI is above normal  Exercise recommendations include exercising 3-5 times per week  Patient Instructions     Recent Results (from the past 672 hour(s))   Gabriela Maier Default    Collection Time: 01/03/20 10:46 AM   Result Value Ref Range    White Blood Cell Count 6 8 3 4 - 10 8 x10E3/uL    Red Blood Cell Count 5 27 4  14 - 5 80 x10E6/uL    Hemoglobin 18 0 (H) 13 0 - 17 7 g/dL    HCT 51 0 37 5 - 51 0 %    MCV 97 79 - 97 fL    MCH 34 2 (H) 26 6 - 33 0 pg    MCHC 35 3 31 5 - 35 7 g/dL    RDW 13 1 12 3 - 15 4 %    Platelet Count 419 793 - 450 x10E3/uL    Neutrophils 56 Not Estab  %    Lymphocytes 35 Not Estab  %    Monocytes 8 Not Estab  %    Eosinophils 1 Not Estab  %    Basophils PCT 0 Not Estab  %    Neutrophils (Absolute) 3 7 1 4 - 7 0 x10E3/uL    Lymphocytes (Absolute) 2 4 0 7 - 3 1 x10E3/uL    Monocytes (Absolute) 0 6 0 1 - 0 9 x10E3/uL    Eosinophils (Absolute) 0 1 0 0 - 0 4 x10E3/uL    Basophils ABS 0 0 0 0 - 0 2 x10E3/uL    Immature Granulocytes 0 Not Estab  %    Immature Granulocytes (Absolute) 0 0 0 0 - 0 1 x10E3/uL   Comprehensive metabolic panel    Collection Time: 01/03/20 10:46 AM   Result Value Ref Range    Glucose, Random 99 65 - 99 mg/dL    BUN 23 6 - 24 mg/dL    Creatinine 1 42 (H) 0 76 - 1 27 mg/dL    eGFR Non  57 (L) >59 mL/min/1 73    eGFR  66 >59 mL/min/1 73    SL AMB BUN/CREATININE RATIO 16 9 - 20    Sodium 137 134 - 144 mmol/L    Potassium 4 9 3 5 - 5 2 mmol/L    Chloride 99 96 - 106 mmol/L    CO2 23 20 - 29 mmol/L    CALCIUM 9 8 8 7 - 10 2 mg/dL    Protein, Total 6 8 6 0 - 8 5 g/dL Albumin 4 8 3 5 - 5 5 g/dL    Globulin, Total 2 0 1 5 - 4 5 g/dL    Albumin/Globulin Ratio 2 4 (H) 1 2 - 2 2    TOTAL BILIRUBIN 0 8 0 0 - 1 2 mg/dL    Alk Phos Isoenzymes 52 39 - 117 IU/L    AST 22 0 - 40 IU/L    ALT 24 0 - 44 IU/L   Lipid panel    Collection Time: 01/03/20 10:46 AM   Result Value Ref Range    Cholesterol, Total 185 100 - 199 mg/dL    Triglycerides 63 0 - 149 mg/dL    HDL 54 >39 mg/dL    LDL Direct 118 (H) 0 - 99 mg/dL   Cardiovascular Report    Collection Time: 01/03/20 10:46 AM   Result Value Ref Range    Interpretation Note     PDF Image Not applicable    [facility] Kidney Stone Panel    Collection Time: 01/03/20 10:46 AM   Result Value Ref Range    Interpretation Note    T4, free    Collection Time: 01/03/20 10:46 AM   Result Value Ref Range    Free t4 1 52 0 82 - 1 77 ng/dL   TSH, 3rd generation    Collection Time: 01/03/20 10:46 AM   Result Value Ref Range    TSH 1 250 0 450 - 4 500 uIU/mL   PSA Total, Diagnostic    Collection Time: 01/03/20 10:46 AM   Result Value Ref Range    Prostate Specific Antigen Total 4 0 0 0 - 4 0 ng/mL           Return in about 1 year (around 1/23/2021) for Annual physical     Subjective:      Patient ID: James Silva is a 44 y o  male  Chief Complaint   Patient presents with    Follow-up     lab work results  Marcie Nava       He has been exercising regularly  Hypothyroidism - well controlled  His energy level has been normal   He is having no problems exercising at the gym  The following portions of the patient's history were reviewed and updated as appropriate:  past social history    Review of Systems      Current Outpatient Medications   Medication Sig Dispense Refill    cetirizine-pseudoephedrine (ZyrTEC-D) 5-120 MG per tablet Take 1 tablet by mouth as needed for allergies   SYNTHROID 100 MCG tablet Take 1 tablet (100 mcg total) by mouth daily 90 tablet 3     No current facility-administered medications for this visit          Objective:    BP 110/62   Pulse 82   Temp (!) 97 2 °F (36 2 °C)   Resp 18   Ht 5' 8" (1 727 m)   Wt 95 7 kg (211 lb)   SpO2 98%   BMI 32 08 kg/m²      Physical Exam   Constitutional: He appears well-developed and well-nourished  HENT:   Head: Normocephalic and atraumatic  Right Ear: External ear normal    Left Ear: External ear normal    Mouth/Throat: Oropharynx is clear and moist    Cardiovascular: Normal rate, regular rhythm and normal heart sounds  No murmur heard  Pulmonary/Chest: Effort normal and breath sounds normal  No respiratory distress  He has no wheezes  He has no rales  Musculoskeletal: He exhibits no edema or tenderness  Nursing note and vitals reviewed            Marjorie Lemus DO

## 2020-01-23 NOTE — PATIENT INSTRUCTIONS
Recent Results (from the past 672 hour(s))   Gabriela Maier Default    Collection Time: 01/03/20 10:46 AM   Result Value Ref Range    White Blood Cell Count 6 8 3 4 - 10 8 x10E3/uL    Red Blood Cell Count 5 27 4  14 - 5 80 x10E6/uL    Hemoglobin 18 0 (H) 13 0 - 17 7 g/dL    HCT 51 0 37 5 - 51 0 %    MCV 97 79 - 97 fL    MCH 34 2 (H) 26 6 - 33 0 pg    MCHC 35 3 31 5 - 35 7 g/dL    RDW 13 1 12 3 - 15 4 %    Platelet Count 395 792 - 450 x10E3/uL    Neutrophils 56 Not Estab  %    Lymphocytes 35 Not Estab  %    Monocytes 8 Not Estab  %    Eosinophils 1 Not Estab  %    Basophils PCT 0 Not Estab  %    Neutrophils (Absolute) 3 7 1 4 - 7 0 x10E3/uL    Lymphocytes (Absolute) 2 4 0 7 - 3 1 x10E3/uL    Monocytes (Absolute) 0 6 0 1 - 0 9 x10E3/uL    Eosinophils (Absolute) 0 1 0 0 - 0 4 x10E3/uL    Basophils ABS 0 0 0 0 - 0 2 x10E3/uL    Immature Granulocytes 0 Not Estab  %    Immature Granulocytes (Absolute) 0 0 0 0 - 0 1 x10E3/uL   Comprehensive metabolic panel    Collection Time: 01/03/20 10:46 AM   Result Value Ref Range    Glucose, Random 99 65 - 99 mg/dL    BUN 23 6 - 24 mg/dL    Creatinine 1 42 (H) 0 76 - 1 27 mg/dL    eGFR Non  57 (L) >59 mL/min/1 73    eGFR  66 >59 mL/min/1 73    SL AMB BUN/CREATININE RATIO 16 9 - 20    Sodium 137 134 - 144 mmol/L    Potassium 4 9 3 5 - 5 2 mmol/L    Chloride 99 96 - 106 mmol/L    CO2 23 20 - 29 mmol/L    CALCIUM 9 8 8 7 - 10 2 mg/dL    Protein, Total 6 8 6 0 - 8 5 g/dL    Albumin 4 8 3 5 - 5 5 g/dL    Globulin, Total 2 0 1 5 - 4 5 g/dL    Albumin/Globulin Ratio 2 4 (H) 1 2 - 2 2    TOTAL BILIRUBIN 0 8 0 0 - 1 2 mg/dL    Alk Phos Isoenzymes 52 39 - 117 IU/L    AST 22 0 - 40 IU/L    ALT 24 0 - 44 IU/L   Lipid panel    Collection Time: 01/03/20 10:46 AM   Result Value Ref Range    Cholesterol, Total 185 100 - 199 mg/dL    Triglycerides 63 0 - 149 mg/dL    HDL 54 >39 mg/dL    LDL Direct 118 (H) 0 - 99 mg/dL   Cardiovascular Report    Collection Time: 01/03/20 10:46 AM   Result Value Ref Range    Interpretation Note     PDF Image Not applicable    [facility] Kidney Stone Panel    Collection Time: 01/03/20 10:46 AM   Result Value Ref Range    Interpretation Note    T4, free    Collection Time: 01/03/20 10:46 AM   Result Value Ref Range    Free t4 1 52 0 82 - 1 77 ng/dL   TSH, 3rd generation    Collection Time: 01/03/20 10:46 AM   Result Value Ref Range    TSH 1 250 0 450 - 4 500 uIU/mL   PSA Total, Diagnostic    Collection Time: 01/03/20 10:46 AM   Result Value Ref Range    Prostate Specific Antigen Total 4 0 0 0 - 4 0 ng/mL

## 2020-11-06 ENCOUNTER — TELEPHONE (OUTPATIENT)
Dept: FAMILY MEDICINE CLINIC | Facility: CLINIC | Age: 50
End: 2020-11-06

## 2020-11-18 ENCOUNTER — TELEPHONE (OUTPATIENT)
Dept: FAMILY MEDICINE CLINIC | Facility: CLINIC | Age: 50
End: 2020-11-18

## 2020-11-30 ENCOUNTER — OFFICE VISIT (OUTPATIENT)
Dept: FAMILY MEDICINE CLINIC | Facility: CLINIC | Age: 50
End: 2020-11-30
Payer: COMMERCIAL

## 2020-11-30 VITALS
SYSTOLIC BLOOD PRESSURE: 128 MMHG | OXYGEN SATURATION: 96 % | TEMPERATURE: 97.7 F | HEIGHT: 68 IN | BODY MASS INDEX: 32.74 KG/M2 | HEART RATE: 88 BPM | RESPIRATION RATE: 20 BRPM | WEIGHT: 216 LBS | DIASTOLIC BLOOD PRESSURE: 80 MMHG

## 2020-11-30 DIAGNOSIS — E03.9 PRIMARY HYPOTHYROIDISM: Primary | ICD-10-CM

## 2020-11-30 DIAGNOSIS — R06.83 SNORING: ICD-10-CM

## 2020-11-30 DIAGNOSIS — Z13.6 SCREENING FOR CARDIOVASCULAR CONDITION: ICD-10-CM

## 2020-11-30 DIAGNOSIS — Z13.0 SCREENING FOR DEFICIENCY ANEMIA: ICD-10-CM

## 2020-11-30 PROCEDURE — 3725F SCREEN DEPRESSION PERFORMED: CPT | Performed by: FAMILY MEDICINE

## 2020-11-30 PROCEDURE — 3008F BODY MASS INDEX DOCD: CPT | Performed by: FAMILY MEDICINE

## 2020-11-30 PROCEDURE — 99213 OFFICE O/P EST LOW 20 MIN: CPT | Performed by: FAMILY MEDICINE

## 2020-11-30 PROCEDURE — 1036F TOBACCO NON-USER: CPT | Performed by: FAMILY MEDICINE

## 2021-01-15 LAB
ALBUMIN SERPL-MCNC: 4.4 G/DL (ref 3.8–4.9)
ALBUMIN/GLOB SERPL: 2.1 {RATIO} (ref 1.2–2.2)
ALP SERPL-CCNC: 57 IU/L (ref 39–117)
ALT SERPL-CCNC: 24 IU/L (ref 0–44)
AST SERPL-CCNC: 29 IU/L (ref 0–40)
BILIRUB SERPL-MCNC: 1.1 MG/DL (ref 0–1.2)
BUN SERPL-MCNC: 18 MG/DL (ref 6–24)
BUN/CREAT SERPL: 12 (ref 9–20)
CALCIUM SERPL-MCNC: 9.7 MG/DL (ref 8.7–10.2)
CHLORIDE SERPL-SCNC: 99 MMOL/L (ref 96–106)
CHOLEST SERPL-MCNC: 182 MG/DL (ref 100–199)
CO2 SERPL-SCNC: 25 MMOL/L (ref 20–29)
CREAT SERPL-MCNC: 1.48 MG/DL (ref 0.76–1.27)
ERYTHROCYTE [DISTWIDTH] IN BLOOD BY AUTOMATED COUNT: 12 % (ref 11.6–15.4)
GLOBULIN SER-MCNC: 2.1 G/DL (ref 1.5–4.5)
GLUCOSE SERPL-MCNC: 93 MG/DL (ref 65–99)
HCT VFR BLD AUTO: 51.1 % (ref 37.5–51)
HDLC SERPL-MCNC: 55 MG/DL
HGB BLD-MCNC: 16.9 G/DL (ref 13–17.7)
LDLC SERPL CALC-MCNC: 113 MG/DL (ref 0–99)
LDLC/HDLC SERPL: 2.1 RATIO (ref 0–3.6)
MCH RBC QN AUTO: 32.2 PG (ref 26.6–33)
MCHC RBC AUTO-ENTMCNC: 33.1 G/DL (ref 31.5–35.7)
MCV RBC AUTO: 97 FL (ref 79–97)
MICRODELETION SYND BLD/T FISH: NORMAL
MICRODELETION SYND BLD/T FISH: NORMAL
PLATELET # BLD AUTO: 174 X10E3/UL (ref 150–450)
POTASSIUM SERPL-SCNC: 4.7 MMOL/L (ref 3.5–5.2)
PROT SERPL-MCNC: 6.5 G/DL (ref 6–8.5)
RBC # BLD AUTO: 5.25 X10E6/UL (ref 4.14–5.8)
SL AMB EGFR AFRICAN AMERICAN: 62 ML/MIN/1.73
SL AMB EGFR NON AFRICAN AMERICAN: 54 ML/MIN/1.73
SL AMB PDF IMAGE: NORMAL
SL AMB VLDL CHOLESTEROL CALC: 14 MG/DL (ref 5–40)
SODIUM SERPL-SCNC: 138 MMOL/L (ref 134–144)
T4 FREE SERPL-MCNC: 1.73 NG/DL (ref 0.82–1.77)
TRIGL SERPL-MCNC: 75 MG/DL (ref 0–149)
TSH SERPL DL<=0.005 MIU/L-ACNC: 1.06 UIU/ML (ref 0.45–4.5)
WBC # BLD AUTO: 5.5 X10E3/UL (ref 3.4–10.8)

## 2021-03-27 DIAGNOSIS — E03.9 PRIMARY HYPOTHYROIDISM: ICD-10-CM

## 2021-03-28 RX ORDER — LEVOTHYROXINE SODIUM 100 MCG
TABLET ORAL
Qty: 90 TABLET | Refills: 1 | Status: SHIPPED | OUTPATIENT
Start: 2021-03-28 | End: 2021-10-03

## 2021-08-11 ENCOUNTER — OFFICE VISIT (OUTPATIENT)
Dept: FAMILY MEDICINE CLINIC | Facility: CLINIC | Age: 51
End: 2021-08-11
Payer: COMMERCIAL

## 2021-08-11 VITALS
SYSTOLIC BLOOD PRESSURE: 120 MMHG | BODY MASS INDEX: 32.28 KG/M2 | RESPIRATION RATE: 16 BRPM | TEMPERATURE: 98.8 F | HEART RATE: 61 BPM | WEIGHT: 213 LBS | DIASTOLIC BLOOD PRESSURE: 64 MMHG | OXYGEN SATURATION: 98 % | HEIGHT: 68 IN

## 2021-08-11 DIAGNOSIS — J01.90 ACUTE SINUSITIS, RECURRENCE NOT SPECIFIED, UNSPECIFIED LOCATION: Primary | ICD-10-CM

## 2021-08-11 PROCEDURE — 3008F BODY MASS INDEX DOCD: CPT | Performed by: NURSE PRACTITIONER

## 2021-08-11 PROCEDURE — 99213 OFFICE O/P EST LOW 20 MIN: CPT | Performed by: NURSE PRACTITIONER

## 2021-08-11 PROCEDURE — 1036F TOBACCO NON-USER: CPT | Performed by: NURSE PRACTITIONER

## 2021-08-11 RX ORDER — AZITHROMYCIN 250 MG/1
TABLET, FILM COATED ORAL
Qty: 6 TABLET | Refills: 0 | Status: SHIPPED | OUTPATIENT
Start: 2021-08-11 | End: 2021-08-16

## 2021-08-11 NOTE — PATIENT INSTRUCTIONS
Take medication with food  It is important that you take the entire course of antibiotics prescribed  May also take a probiotic of your choice to maintain healthy GI kortney  Can take some probiotic and yogurt with the medication  Supportive care discussed and advised  Advised to RTO for any worsening and no improvement  Follow up for no improvement and worsening of conditions  Patient advised and educated when to see immediate medical care

## 2021-08-11 NOTE — PROGRESS NOTES
Assessment/Plan:    1  Acute sinusitis, recurrence not specified, unspecified location  -     azithromycin (ZITHROMAX) 250 mg tablet; Take 500mg on day 1, 250mg on days 2-5          BMI Counseling: Body mass index is 32 39 kg/m²  Discussed the patient's BMI with him  The BMI is above normal  Nutrition recommendations include reducing portion sizes, decreasing overall calorie intake, 3-5 servings of fruits/vegetables daily, reducing fast food intake, consuming healthier snacks and decreasing soda and/or juice intake  Patient Instructions: Take medication with food  It is important that you take the entire course of antibiotics prescribed  May also take a probiotic of your choice to maintain healthy GI kortney  Can take some probiotic and yogurt with the medication  Supportive care discussed and advised  Advised to RTO for any worsening and no improvement  Follow up for no improvement and worsening of conditions  Patient advised and educated when to see immediate medical care  Return if symptoms worsen or fail to improve  No future appointments  Subjective:      Patient ID: Kavin Jordan is a 44 y o  male  Chief Complaint   Patient presents with    Sinues Infection     Last 5 days / Head Pressure  mz cma         Vitals:  /64   Pulse 61   Temp 98 8 °F (37 1 °C)   Resp 16   Ht 5' 8" (1 727 m)   Wt 96 6 kg (213 lb)   SpO2 98%   BMI 32 39 kg/m²     HPI  Patient stated that started with sinus pressure and congestion on 8/6  Denies any fever, chills, sob, cough, chest tightness, running nose, sore throat,  loss of sense of smell, loss of sense of taste, headache, muscle aches, fatigue, malaise, abdominal pain, nausea, vomiting and diarrhea  Denies any known exposure to covid-19 positive or presumed patient   Stated that prone to get sinus infections        The following portions of the patient's history were reviewed and updated as appropriate: allergies, current medications, past family history, past medical history, past social history, past surgical history and problem list       Review of Systems   Constitutional: Positive for fatigue  Negative for chills, diaphoresis, fever and unexpected weight change  HENT: Positive for congestion and sinus pressure  Negative for dental problem, drooling, ear discharge, ear pain, facial swelling, hearing loss, mouth sores, nosebleeds, postnasal drip, rhinorrhea, sinus pain, sneezing, sore throat, tinnitus, trouble swallowing and voice change  Respiratory: Negative for cough, chest tightness, shortness of breath and wheezing  Cardiovascular: Negative  Gastrointestinal: Negative for abdominal pain, constipation, diarrhea, nausea and vomiting  Musculoskeletal: Negative  Skin: Negative  Neurological: Negative for dizziness, light-headedness and headaches  Hematological: Negative  Objective:    Social History     Tobacco Use   Smoking Status Never Smoker   Smokeless Tobacco Never Used       Allergies: Allergies   Allergen Reactions    Penicillins     Amoxicillin-Pot Clavulanate Rash         Current Outpatient Medications   Medication Sig Dispense Refill    cetirizine-pseudoephedrine (ZyrTEC-D) 5-120 MG per tablet Take 1 tablet by mouth as needed for allergies   Synthroid 100 MCG tablet take 1 tablet by mouth once daily 90 tablet 1    azithromycin (ZITHROMAX) 250 mg tablet Take 500mg on day 1, 250mg on days 2-5 6 tablet 0     No current facility-administered medications for this visit  Physical Exam  Vitals reviewed  Constitutional:       Appearance: Normal appearance  He is well-developed  HENT:      Head: Normocephalic  Right Ear: Tympanic membrane, ear canal and external ear normal       Left Ear: Tympanic membrane, ear canal and external ear normal       Nose: Mucosal edema present  Right Sinus: No maxillary sinus tenderness or frontal sinus tenderness        Left Sinus: No maxillary sinus tenderness or frontal sinus tenderness  Mouth/Throat:      Mouth: No oral lesions  Pharynx: No oropharyngeal exudate or posterior oropharyngeal erythema  Cardiovascular:      Rate and Rhythm: Normal rate and regular rhythm  Heart sounds: Normal heart sounds  Pulmonary:      Effort: Pulmonary effort is normal       Breath sounds: Normal breath sounds  Musculoskeletal:         General: Normal range of motion  Cervical back: Neck supple  Lymphadenopathy:      Cervical:      Right cervical: No superficial or posterior cervical adenopathy  Left cervical: No superficial or posterior cervical adenopathy  Skin:     General: Skin is warm and dry  Neurological:      Mental Status: He is alert and oriented to person, place, and time  Psychiatric:         Behavior: Behavior normal          Thought Content:  Thought content normal          Judgment: Judgment normal                      PRIMO Wang

## 2021-10-02 DIAGNOSIS — E03.9 PRIMARY HYPOTHYROIDISM: ICD-10-CM

## 2021-10-03 RX ORDER — LEVOTHYROXINE SODIUM 100 MCG
TABLET ORAL
Qty: 90 TABLET | Refills: 1 | Status: SHIPPED | OUTPATIENT
Start: 2021-10-03 | End: 2022-04-08

## 2022-01-21 PROBLEM — Z86.0100 HISTORY OF COLON POLYPS: Status: ACTIVE | Noted: 2022-01-21

## 2022-01-21 PROBLEM — Z86.010 HISTORY OF COLON POLYPS: Status: ACTIVE | Noted: 2022-01-21

## 2022-01-21 PROBLEM — Z12.11 SCREEN FOR COLON CANCER: Status: RESOLVED | Noted: 2019-03-18 | Resolved: 2022-01-21

## 2022-01-26 ENCOUNTER — OFFICE VISIT (OUTPATIENT)
Dept: FAMILY MEDICINE CLINIC | Facility: CLINIC | Age: 52
End: 2022-01-26
Payer: COMMERCIAL

## 2022-01-26 VITALS
OXYGEN SATURATION: 97 % | RESPIRATION RATE: 16 BRPM | DIASTOLIC BLOOD PRESSURE: 60 MMHG | WEIGHT: 217 LBS | SYSTOLIC BLOOD PRESSURE: 110 MMHG | HEIGHT: 68 IN | BODY MASS INDEX: 32.89 KG/M2 | TEMPERATURE: 97 F | HEART RATE: 97 BPM

## 2022-01-26 DIAGNOSIS — Z11.59 NEED FOR HEPATITIS C SCREENING TEST: ICD-10-CM

## 2022-01-26 DIAGNOSIS — E03.9 PRIMARY HYPOTHYROIDISM: Primary | ICD-10-CM

## 2022-01-26 DIAGNOSIS — L98.9 SKIN LESION: ICD-10-CM

## 2022-01-26 DIAGNOSIS — G47.30 SLEEP APNEA, UNSPECIFIED TYPE: ICD-10-CM

## 2022-01-26 DIAGNOSIS — Z13.0 SCREENING FOR DEFICIENCY ANEMIA: ICD-10-CM

## 2022-01-26 DIAGNOSIS — Z13.6 SCREENING FOR CARDIOVASCULAR CONDITION: ICD-10-CM

## 2022-01-26 DIAGNOSIS — Z12.5 PROSTATE CANCER SCREENING: ICD-10-CM

## 2022-01-26 DIAGNOSIS — Z86.010 HISTORY OF COLON POLYPS: ICD-10-CM

## 2022-01-26 PROCEDURE — 3725F SCREEN DEPRESSION PERFORMED: CPT | Performed by: FAMILY MEDICINE

## 2022-01-26 PROCEDURE — 1036F TOBACCO NON-USER: CPT | Performed by: FAMILY MEDICINE

## 2022-01-26 PROCEDURE — 3008F BODY MASS INDEX DOCD: CPT | Performed by: FAMILY MEDICINE

## 2022-01-26 PROCEDURE — 99214 OFFICE O/P EST MOD 30 MIN: CPT | Performed by: FAMILY MEDICINE

## 2022-01-26 NOTE — PROGRESS NOTES
Assessment/Plan:    1  Primary hypothyroidism  Assessment & Plan:  Has been doing well on Synthroid 100 mcg daily   Will check labs    Orders:  -     T4, free; Future  -     TSH, 3rd generation; Future  -     T4, free  -     TSH, 3rd generation    2  History of colon polyps  Assessment & Plan:  Reminded that he is doing April for his next colonoscopy    Orders:  -     Ambulatory referral to Gastroenterology; Future    3  Sleep apnea, unspecified type  Comments:  Patient has been unable to tolerate CPAP machine would like alternate treatments  Orders:  -     Ambulatory Referral to Sleep Medicine; Future    4  Need for hepatitis C screening test  -     Hepatitis C Antibody ([facility]); Future  -     Hepatitis C Antibody ([facility])    5  Prostate cancer screening  -     PSA Total (Reflex To Free); Future  -     PSA Total (Reflex To Free)    6  Screening for cardiovascular condition  -     Comprehensive metabolic panel; Future  -     Lipid Panel with Direct LDL reflex; Future  -     Comprehensive metabolic panel  -     Lipid Panel with Direct LDL reflex    7  Screening for deficiency anemia  -     CBC; Future  -     CBC    8  Skin lesion  Comments:  New on left hand  Orders:  -     Ambulatory referral to Dermatology; Future    BMI Counseling: Body mass index is 32 99 kg/m²  The BMI is above normal  Exercise recommendations include exercising 3-5 times per week  Rationale for BMI follow-up plan is due to patient being overweight or obese  Depression Screening and Follow-up Plan: Patient was screened for depression during today's encounter  They screened negative with a PHQ-2 score of 0  There are no Patient Instructions on file for this visit  Return if symptoms worsen or fail to improve  Subjective:      Patient ID: Caprice Yates is a 46 y o  male      Chief Complaint   Patient presents with    Follow-up     on sleep apnea, [id number]     allergy test referral       He can't use the CPAP was prescribed  He can't tolerate it  He is still not sleeping well  He would like something other than his CPAP to use to help with his snoring and sleep  He has a new lesion on his left hand  He has been using or medication for it but has been unable to cleared up  He would like to see a dermatologist             The following portions of the patient's history were reviewed and updated as appropriate:  past social history    Review of Systems      Current Outpatient Medications   Medication Sig Dispense Refill    cetirizine-pseudoephedrine (ZyrTEC-D) 5-120 MG per tablet Take 1 tablet by mouth as needed for allergies   Synthroid 100 MCG tablet take 1 tablet by mouth once daily 90 tablet 1     No current facility-administered medications for this visit  Objective:    /60   Pulse 97   Temp (!) 97 °F (36 1 °C)   Resp 16   Ht 5' 8" (1 727 m)   Wt 98 4 kg (217 lb)   SpO2 97%   BMI 32 99 kg/m²      Physical Exam  Vitals and nursing note reviewed  Constitutional:       Appearance: He is well-developed  HENT:      Head: Normocephalic and atraumatic  Right Ear: Tympanic membrane and external ear normal       Left Ear: Tympanic membrane and external ear normal    Cardiovascular:      Rate and Rhythm: Normal rate and regular rhythm  Heart sounds: Normal heart sounds  No murmur heard  Pulmonary:      Effort: Pulmonary effort is normal  No respiratory distress  Breath sounds: Normal breath sounds  No wheezing or rales  Musculoskeletal:      Right lower leg: No edema  Left lower leg: No edema  Skin:     Findings: Lesion (Left hand, raised) present               Ruth [place], DO

## 2022-01-31 ENCOUNTER — TELEPHONE (OUTPATIENT)
Dept: DERMATOLOGY | Facility: CLINIC | Age: 52
End: 2022-01-31

## 2022-01-31 NOTE — TELEPHONE ENCOUNTER
Pt left v/m wanting to schedule a new pt appt, pt has referral in chart, c/b but n/a, left c/b info

## 2022-02-02 ENCOUNTER — TELEPHONE (OUTPATIENT)
Dept: FAMILY MEDICINE CLINIC | Facility: CLINIC | Age: 52
End: 2022-02-02

## 2022-02-02 DIAGNOSIS — E83.52 HYPERCALCEMIA: Primary | ICD-10-CM

## 2022-02-02 DIAGNOSIS — R97.20 ELEVATED PSA: ICD-10-CM

## 2022-02-02 LAB
ALBUMIN SERPL-MCNC: 4.5 G/DL (ref 3.8–4.9)
ALBUMIN/GLOB SERPL: 2 {RATIO} (ref 1.2–2.2)
ALP SERPL-CCNC: 56 IU/L (ref 44–121)
ALT SERPL-CCNC: 21 IU/L (ref 0–44)
AST SERPL-CCNC: 19 IU/L (ref 0–40)
BILIRUB SERPL-MCNC: 0.5 MG/DL (ref 0–1.2)
BUN SERPL-MCNC: 25 MG/DL (ref 6–24)
BUN/CREAT SERPL: 17 (ref 9–20)
CALCIUM SERPL-MCNC: 10.3 MG/DL (ref 8.7–10.2)
CHLORIDE SERPL-SCNC: 100 MMOL/L (ref 96–106)
CHOLEST SERPL-MCNC: 194 MG/DL (ref 100–199)
CO2 SERPL-SCNC: 25 MMOL/L (ref 20–29)
CREAT SERPL-MCNC: 1.44 MG/DL (ref 0.76–1.27)
ERYTHROCYTE [DISTWIDTH] IN BLOOD BY AUTOMATED COUNT: 11.8 % (ref 11.6–15.4)
GLOBULIN SER-MCNC: 2.3 G/DL (ref 1.5–4.5)
GLUCOSE SERPL-MCNC: 102 MG/DL (ref 65–99)
HCT VFR BLD AUTO: 48.3 % (ref 37.5–51)
HCV AB S/CO SERPL IA: <0.1 S/CO RATIO (ref 0–0.9)
HDLC SERPL-MCNC: 55 MG/DL
HGB BLD-MCNC: 16.5 G/DL (ref 13–17.7)
LDLC SERPL CALC-MCNC: 126 MG/DL (ref 0–99)
LDLC/HDLC SERPL: 2.3 RATIO (ref 0–3.6)
MCH RBC QN AUTO: 33.1 PG (ref 26.6–33)
MCHC RBC AUTO-ENTMCNC: 34.2 G/DL (ref 31.5–35.7)
MCV RBC AUTO: 97 FL (ref 79–97)
MICRODELETION SYND BLD/T FISH: NORMAL
MICRODELETION SYND BLD/T FISH: NORMAL
PLATELET # BLD AUTO: 180 X10E3/UL (ref 150–450)
POTASSIUM SERPL-SCNC: 5.4 MMOL/L (ref 3.5–5.2)
PROT SERPL-MCNC: 6.8 G/DL (ref 6–8.5)
PSA SERPL-MCNC: 4.7 NG/ML (ref 0–4)
RBC # BLD AUTO: 4.98 X10E6/UL (ref 4.14–5.8)
SL AMB % FREE PSA: 22.6 %
SL AMB EGFR AFRICAN AMERICAN: 64 ML/MIN/1.73
SL AMB EGFR NON AFRICAN AMERICAN: 55 ML/MIN/1.73
SL AMB PSA, FREE: 1.06 NG/ML
SL AMB REFLEX CRITERIA: ABNORMAL
SL AMB VLDL CHOLESTEROL CALC: 13 MG/DL (ref 5–40)
SODIUM SERPL-SCNC: 138 MMOL/L (ref 134–144)
T4 FREE SERPL-MCNC: 1.7 NG/DL (ref 0.82–1.77)
TRIGL SERPL-MCNC: 71 MG/DL (ref 0–149)
TSH SERPL DL<=0.005 MIU/L-ACNC: 1.24 UIU/ML (ref 0.45–4.5)
WBC # BLD AUTO: 5.9 X10E3/UL (ref 3.4–10.8)

## 2022-02-02 NOTE — TELEPHONE ENCOUNTER
2/2/2022 8:28 AM Called [facility] regarding his lab results  We discussed his elevated PSA  It went up from 4 to 4 7  It is not a fast rise but I am concerned about level  He referred him to Urology for further evaluation  We also discussed his elevated calcium    He agreed to get his level rechecked in 1 week after stopping all of his supplements    Onofre Nicholson, DO

## 2022-02-15 ENCOUNTER — CONSULT (OUTPATIENT)
Dept: DERMATOLOGY | Facility: CLINIC | Age: 52
End: 2022-02-15
Payer: COMMERCIAL

## 2022-02-15 VITALS — WEIGHT: 215 LBS | TEMPERATURE: 97.4 F | BODY MASS INDEX: 31.84 KG/M2 | HEIGHT: 69 IN

## 2022-02-15 DIAGNOSIS — B07.9 VERRUCA VULGARIS: Primary | ICD-10-CM

## 2022-02-15 DIAGNOSIS — Z12.83 SCREENING EXAM FOR SKIN CANCER: ICD-10-CM

## 2022-02-15 PROCEDURE — 1036F TOBACCO NON-USER: CPT | Performed by: DERMATOLOGY

## 2022-02-15 PROCEDURE — 17110 DESTRUCTION B9 LES UP TO 14: CPT | Performed by: DERMATOLOGY

## 2022-02-15 PROCEDURE — 99203 OFFICE O/P NEW LOW 30 MIN: CPT | Performed by: DERMATOLOGY

## 2022-02-15 PROCEDURE — 3008F BODY MASS INDEX DOCD: CPT | Performed by: DERMATOLOGY

## 2022-02-15 NOTE — PROGRESS NOTES
[facility] Note     Patient Name: Alen Mares  Encounter Date: 02/15/22     Have you been cared for by a [facility] Dermatologist in the last 3 years and, if so, which one? No    · Have you traveled outside of the [address] [place] [address] in the past 3 months or outside of the [facility] area in the last 2 weeks? No     May we call your Preferred Phone number to discuss your specific medical information? Yes     May we leave a detailed message that includes your specific medical information? Yes      Today's Chief Concerns:   Concern #1:  Wart on left hand   Concern #2:  Skin check    Past Medical History:  Have you personally ever had or currently have any of the following? · Skin cancer (such as Melanoma, Basal Cell Carcinoma, Squamous Cell Carcinoma? (If Yes, please provide more detail)- No  · Eczema: No  · Psoriasis: No  · HIV/AIDS: No  · Hepatitis B or C: No  · Tuberculosis: No  · Systemic Immunosuppression such as Diabetes, Biologic or Immunotherapy, Chemotherapy, Organ Transplantation, Bone Marrow Transplantation (If YES, please provide more detail): No  · Radiation Treatment (If YES, please provide more detail): No  · Any other major medical conditions/concerns? 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 Family History:  Have any of your "first degree relatives" (parent, brother, sister, or child) had any of the following       · Skin cancer such as Melanoma or Merkel Cell Carcinoma or Pancreatic Cancer? Sisters Josselyn and Toi -hx of Zixi; Maternal aunt has hx of MM  · Eczema, Asthma, Hay Fever or Seasonal Allergies: No  · Psoriasis or Psoriatic Arthritis: No  · Do any other medical conditions seem to run in your family? If Yes, what condition and which relatives?   YES, mother had hx of breast cancer, sister has hx of cancer    Current Medications: Current Outpatient Medications:     cetirizine-pseudoephedrine (ZyrTEC-D) 5-120 MG per tablet, Take 1 tablet by mouth as needed for allergies  , Disp: , Rfl:     Synthroid 100 MCG tablet, take 1 tablet by mouth once daily, Disp: 90 tablet, Rfl: 1      Review of Systems:  Have you recently had or currently have any of the following? If YES, what are you doing for the problem? · Fever, chills or unintended weight loss: No  · Sudden loss or change in your vision: No  · Nausea, vomiting or blood in your stool: No  · Painful or swollen joints: No  · Wheezing or cough: No  · Changing mole or non-healing wound: No  · Nosebleeds: No  · Excessive sweating: No  · Easy or prolonged bleeding? No  · Over the last 2 weeks, how often have you been bothered by the following problems? · Taking little interest or pleasure in doing things: 1 - Not at All  · Feeling down, depressed, or hopeless: 1 - Not at All  · Rapid heartbeat with epinephrine:  No    · Any known allergies? Allergies   Allergen Reactions    Penicillins     Amoxicillin-Pot Clavulanate Rash   ·       Physical Exam:     Was a chaperone (Derm Clinical Assistant) present throughout the entire Physical Exam? Yes     Did the Dermatology Team specifically  the patient on the importance of a Full Skin Exam to be sure that nothing is missed clinically?  Yes}  o Did the patient ultimately request or accept a Full Skin Exam?  Yes  o Did the patient specifically refuse to have the areas "under-the-bra" examined by the Dermatologist? No  o Did the patient specifically refuse to have the areas "under-the-underwear" examined by the Dermatologist? No    CONSTITUTIONAL:   Vitals:    02/15/22 0937   Temp: (!) 97 4 °F (36 3 °C)   TempSrc: Temporal   Weight: 97 5 kg (215 lb)   Height: 5' 9" (1 753 m)     PSYCH: Normal mood and affect  EYES: Normal conjunctiva  ENT: Normal lips  CARDIOVASCULAR: No significant edema  RESPIRATORY: Normal respirations    SKIN: FULL ORGAN SYSTEM EXAM   Hair, Scalp, Ears, Face Normal except as noted below in Assessment   Neck Normal except as noted below in Assessment   Right Arm/Hand/Fingers Normal except as noted below in Assessment   Left Arm/Hand/Fingers Normal except as noted below in Assessment   Chest/Breasts/Axillae Viewed areas Normal except as noted below in Assessment   Abdomen, Umbilicus Normal except as noted below in Assessment   Back/Spine Normal except as noted below in Assessment       Right Leg, Foot, Toes Normal except as noted below in Assessment   Left Leg, Foot, Toes Normal except as noted below in Assessment        Assessment and Plan by Diagnosis:    History of Present Condition:     Duration:  How long has this been an issue for you?    o  5-6 + years   Location Affected:  Where on the body is this affecting you?    o  back   Quality:  Is there any bleeding, pain, itch, burning/irritation, or redness associated with the skin lesion? o  itch   Severity:  Describe any bleeding, pain, itch, burning/irritation, or redness on a scale of 1 to 10 (with 10 being the worst)  o  5   Timing:  Does this condition seem to be there pretty constantly or do you notice it more at specific times throughout the day? o  comes and goes   Context:  Have you ever noticed that this condition seems to be associated with specific activities you do?    o  denies   Modifying Factors:    o Anything that seems to make the condition worse?    -  denies  o What have you tried to do to make the condition better?    -  denies   Associated Signs and Symptoms:  Does this skin lesion seem to be associated with any of the following:  o  SL AMB DERM SIGNS AND SYMPTOMS: Itching and Scratching     VERRUCA VULGARIS ("Common Wart")    Physical Exam:   Anatomic Location Affected:  Left dorsal hand   Morphological Description:  Verruca vulgaris   Pertinent Positives:   Pertinent Negatives:     Additional History of Present Condition:  Present for couple of years; used over the counter wart remover     Assessment and Plan:  Based on a thorough discussion of this condition and the management approach to it (including a comprehensive discussion of the known risks, side effects and potential benefits of treatment), the patient (family) agrees to implement the following specific plan:     Liquid nitrogen was applied for 10-12 seconds to the skin lesion and the expected blistering or scabbing reaction explained  Do not pick at the area  Patient reminded to expect hypopigmented scars from the procedure  Return if lesion fails to fully resolve  Verruca Vulgaris  A verruca is a common growth of the skin caused by infection by human papilloma virus (HPV)  There are many strains of the virus that cause different types of warts on the body  The virus infects the most superficial layers of the skin, causing increased production of skin cells and thickening  Warts can be spread through direct contact with infected skin and may spread to other parts of the body if scratched or picked  A verruca is more commonly called a "wart " Warts are particularly common in school-aged children but can arise at any age  Patients who have a history of eczema are especially prone due to impaired skin barrier  Those taking immunosuppressive drugs or with HIV infections may experience prolonged symptoms despite treatment  Warts generally have a rough surface with a tiny black dot sometimes observed in the middle of each scaly spot  They can range in size from a small bump to large scaly growths  Common warts are often found on the backs of fingers or toes, around the nails, and on the knees  Plantar warts can grow inwardly on the soles of the feet causing pain  There are many possible ways to treat warts and sometimes several different treatments are needed to get the warts to go away completely   There is no single perfect treatment for warts, and successful treatment can take many months  In-office treatments usually require multiple visits, and include:  1) Cryotherapy  a cold spray with liquid nitrogen will destroy the infected cells but may lead to discomfort and blistering  It may also leave a permanent white jami or scar  2) Electrosurgery (curettage and cautery) can be used for large resistant warts which involves shaving the growth down and burning the base  It is performed under local anesthesia and may leave a permanent scar    3) Candida (yeast) antigen injections  These are extracts of the common yeast (Candida) that cannot cause an infection  The medication is injected into/under the wart  It is thought to stimulate the immune system to recognize the wart virus and attack it  Multiple injections are often needed about one month apart  There are also several at-home wart treatments:    1) Soak the warts in warm water for 5 minutes every night followed by gentle filing with a nail file or pumice stone  2) Topical salicylic acid or similar compounds work by removing the dead surface skin cells  a  Apply the medicine directly to the wart, wait for it to dry completely, then cover with duct tape overnight   b  Repeat until the wart is gone, which can take 2-4 months  c  Do not use on the face or groin area   d  If the wart paint makes the skin sore, stop treatment until the discomfort has settled, then recommence as above   e  Take care to keep the chemical off normal skin  3) Podophyllin is a cytotoxic agent used in some products and must not be used in pregnancy or women considering pregnancy  4) Some prescription medications include   a  Topical retinoids (adapalene, tretinoin, tazarotene), 5-fluorouracil (Efudex) or imiquimod (Aldara) creams are sometimes used to treat flat warts or warts on the face and other sensitive anatomical areas  They are usually applied directly to the warts once a day for 2-4 months and can be irritating    These treatments should only be used as directed by your health care provider  b  Systemic treatment with oral cimetidine (Tagamet) may help boost the immune system against the wart virus in patients, some of the time  Initiation of cimetidine therapy should ONLY be done under the supervision of your health care provider, who can discuss possible side effects and drug-to-drug interactions of this specific treatment   Apply moisturing creams; Cera ve; Eucerin    Dry skin refers to skin that feels dry to touch  Dry skin has a dull surface with a rough, scaly quality  The skin is less pliable and cracked  When dryness is severe, the skin may become inflamed and fissured  Although any body site can be dry, dry skin tends to affect the shins more than any other site  Dry skin is lacking moisture in the outer horny cell layer (stratum corneum) and this results in cracks in the skin surface  Dry skin is also called xerosis, xeroderma or asteatosis (lack of fat)  It can affect males and females of all ages  There is some racial variability in water and lipid content of the skin   Dry skin that starts in early childhood may be one of about 20 types of ichthyosis (fish-scale skin)  There is often a family history of dry skin   Dry skin is commonly seen in people with atopic dermatitis   Nearly everyone > 60 years has dry skin  Dry skin that begins later may be seen in people with certain diseases and conditions   Postmenopausal women   Hypothyroidism   Chronic renal disease    Malnutrition and weight loss    Subclinical dermatitis    Treatment with certain drugs such as oral retinoids, diuretics and epidermal growth factor receptor inhibitors    People exposed to a dry environment may experience dry skin     Low humidity: in desert climates or cool, windy conditions    Excessive air conditioning    Direct heat from a fire or fan heater    Excessive bathing    Contact with soap, detergents and solvents    Inappropriate topical agents such as alcohol    Frictional irritation from rough clothing or abrasives    Dry skin is due to abnormalities in the integrity of the barrier function of the stratum corneum, which is made up of corneocytes   There is an overall reduction in the lipids in the stratum corneum   Ratio of ceramides, cholesterol and free fatty acids may be normal or altered   There may be a reduction in the proliferation of keratinocytes   Keratinocyte subtypes change in dry skin with a decrease in keratins K1, K10 and increase in K5, K14     Involucrin (a protein) may be expressed early, increasing cell stiffness   The result is retention of corneocytes and reduced water-holding capacity  How can dry skin be prevented? Eliminate aggravating factors:   Reduce the frequency of bathing   A humidifier in winter and air conditioner in summer    Compare having a short shower with a prolonged soak in a bath   Use lukewarm, not hot, water   Replace standard soap with a substitute such as a synthetic detergent cleanser, water-miscible emollient, bath oil, anti-pruritic tar oil, colloidal oatmeal etc     Apply an emollient liberally and often, particularly shortly after bathing, and when itchy  The drier the skin, the thicker this should be, especially on the hands  What is the outlook for dry skin? A tendency to dry skin may persist life-long, or it may improve once contributing factors are controlled  Melanoma  Malignant melanoma is a type of skin cancer that can be deadly if it spreads throughout the body  The incidence of melanoma in the [place] is growing faster than any other cancer  Melanoma usually grows near the surface of the skin for a period of time, and then begins to grow deeper into the skin  Once it grows deeper into the skin, the risk of spread to other organs greatly increases   Therefore, early detection and removal of a malignant melanoma may result in a better chance at a complete cure; removal after the tumor has spread may not be as effective, leading to worse clinical outcomes such as death  The true rate of nevus transformation into a melanoma is unknown  It has been estimated that the lifetime risk for any acquired melanocytic nevus on any 21year-old individual transforming into melanoma by age [de-identified] is 0 03% (1 in 3,164) for men and 0 009% (1 in 10,800) for women  The appearance of a "new mole" remains one of the most reliable methods for identifying a malignant melanoma  Occasionally, melanomas appear as rapidly growing, blue-black, dome-shaped bumps within a previous mole or previous area of normal skin  Other times, melanomas are suspected when a mole suddenly appears or changes  Itching, burning, or pain in a pigmented lesion should increase suspicion, but most patients with early melanoma have no skin discomfort whatsoever  Melanoma can occur anywhere on the skin, including areas that are difficult for self-examination  Many melanomas are first noticed by other family members  Suspicious-looking moles may be removed for microscopic examination  You may be able to prevent death from melanoma by doing two simple things:    1  Try to avoid unnecessary sun exposure and protect your skin when it is exposed to the sun  People who live near the equator, people who have intermittent exposures to large amounts of sun, and people who have had sunburns in childhood or adolescence have an increased risk for melanoma  Sun sense and vigilant sun protection may be keys to helping to prevent melanoma  We recommend wearing UPF-rated sun protective clothing and sunglasses whenever possible and applying a moisturizer-sunscreen combination product (SPF 50+) such as Neutrogena Daily Defense to sun exposed areas of skin at least three times a day      2  Have your moles regularly examined by a board certified dermatologist AND by yourself or a family member/friend at home  We recommend that you have your moles examined at least once a year by a board certified dermatologist   Use your birthday as an annual reminder to have your "Birthday Suit" (I e , your skin) examined; it is a nice birthday gift to yourself to know that your skin is healthy appearing! Additionally, at-home self examinations may be helpful for detecting a possible melanoma  Use the ABCDEs we discussed and check your moles once a month at home  Physical Exam:   Anatomic Location Affected:  Left [facility]   Morphological Description:  Light tan macule c/w SL   Pertinent Positives:   Pertinent Negatives: Additional History of Present Condition:  Present for years    Assessment and Plan:  Based on a thorough discussion of this condition and the management approach to it (including a comprehensive discussion of the known risks, side effects and potential benefits of treatment), the patient (family) agrees to implement the following specific plan:   Reassured benign    Solar lentigo   A precursor to seborrhoeic keratosis    Found on chronically sun exposed sites such as hands, face, lower legs    May also follow sunburn to shoulders    Yellow, light or dark brown regular or irregular macule or thin plaque    May have a dry surface    Often has moth-eaten outline    Can slowly enlarge to several centimeters in diameter    May disappear, often through the process known as lichenoid keratosis    When atypical in appearance, may be difficult to distinguish from melanoma in situ  Most lentigines are left alone  Attempts to lighten them may not be successful   The following approaches are used:   SPF 50+ broad-spectrum sunscreen    Hydroquinone bleaching cream    Alpha hydroxy acids    Vitamin C    Retinoids    Azelaic acid    Chemical peels  Individual lesions can be permanently removed using:   Cryotherapy    Intense pulsed light    Pigment lasers    How can lentigines be prevented? Lentigines associated with exposure ultraviolet radiation can be prevented by very careful sun protection  Clothing is more successful at preventing new lentigines than are sunscreens  What is the outlook for lentigines? Lentigines usually persist  They may increase in number with age and sun exposure  Some in sun-protected sites may fade and disappear      Raghavendra Thomas, DO  Scribe Attestation    I,:  Natalya Gregory am acting as a scribe while in the presence of the attending physician :       I,:  Lindsay Herrera MD personally performed the services described in this documentation    as scribed in my presence :

## 2022-02-17 ENCOUNTER — TELEPHONE (OUTPATIENT)
Dept: GASTROENTEROLOGY | Facility: AMBULARY SURGERY CENTER | Age: 52
End: 2022-02-17

## 2022-04-01 ENCOUNTER — OFFICE VISIT (OUTPATIENT)
Dept: FAMILY MEDICINE CLINIC | Facility: CLINIC | Age: 52
End: 2022-04-01
Payer: COMMERCIAL

## 2022-04-01 VITALS
HEIGHT: 69 IN | DIASTOLIC BLOOD PRESSURE: 80 MMHG | HEART RATE: 100 BPM | WEIGHT: 208 LBS | RESPIRATION RATE: 16 BRPM | BODY MASS INDEX: 30.81 KG/M2 | TEMPERATURE: 100.8 F | SYSTOLIC BLOOD PRESSURE: 108 MMHG

## 2022-04-01 DIAGNOSIS — B34.9 VIRAL INFECTION, UNSPECIFIED: Primary | ICD-10-CM

## 2022-04-01 PROCEDURE — 87636 SARSCOV2 & INF A&B AMP PRB: CPT | Performed by: NURSE PRACTITIONER

## 2022-04-01 PROCEDURE — 99213 OFFICE O/P EST LOW 20 MIN: CPT | Performed by: NURSE PRACTITIONER

## 2022-04-01 NOTE — PROGRESS NOTES
Assessment/Plan:    1  Viral infection, unspecified  -     Covid/Flu- Office Collect              Patient Instructions:  Supportive care discussed and advised  Advised to RTO for any worsening and no improvement  Follow up for no improvement and worsening of conditions  Patient advised and educated when to see immediate medical care  Return if symptoms worsen or fail to improve  Future Appointments   Date Time Provider Fortino Randalli   4/1/2022  2:45 PM PRIMO Shepherd [facility]   4/6/2022  8:45 AM PRIMO Lemus [facility]           Subjective:      Patient ID: Eusebia Sanchez is a 46 y o  male  Chief Complaint   Patient presents with    Nasal Congestion     Started 2 days ago  NO known fever but has chills JMoyle LPN    Generalized Body Aches         Vitals:  /80   Pulse 100   Temp (!) 100 8 °F (38 2 °C)   Resp 16   Ht 5' 9" (1 753 m)   Wt 94 3 kg (208 lb)   BMI 30 72 kg/m²     HPI   Patient stated that started with symptoms on 03/29/2022 with congestion, bodyaches and progressed to chills, and decreased appetite  Denies chest pain and sob  Denies any exposure to covid-19 person  Never been vaccinated for covid-19  The following portions of the patient's history were reviewed and updated as appropriate: allergies, current medications, past family history, past medical history, past social history, past surgical history and problem list       Review of Systems   Constitutional: Positive for appetite change and chills  Negative for diaphoresis, fatigue and unexpected weight change  HENT: Positive for congestion  Negative for dental problem, drooling, ear discharge, ear pain, facial swelling, hearing loss, mouth sores, nosebleeds, postnasal drip, rhinorrhea, sinus pressure, sinus pain, sneezing, sore throat, tinnitus, trouble swallowing and voice change  Respiratory: Negative for cough, chest tightness, shortness of breath and wheezing  Cardiovascular: Negative  Gastrointestinal: Negative for abdominal pain, constipation, diarrhea, nausea and vomiting  Musculoskeletal: Positive for myalgias  Neurological: Negative for dizziness, light-headedness and headaches  Objective:    Social History     Tobacco Use   Smoking Status Never Smoker   Smokeless Tobacco Never Used       Allergies: Allergies   Allergen Reactions    Penicillins     Amoxicillin-Pot Clavulanate Rash         Current Outpatient Medications   Medication Sig Dispense Refill    cetirizine-pseudoephedrine (ZyrTEC-D) 5-120 MG per tablet Take 1 tablet by mouth as needed for allergies   Synthroid 100 MCG tablet take 1 tablet by mouth once daily 90 tablet 1     No current facility-administered medications for this visit  Physical Exam  Vitals reviewed  Constitutional:       Appearance: Normal appearance  He is well-developed  HENT:      Head: Normocephalic  Right Ear: Tympanic membrane, ear canal and external ear normal       Left Ear: Tympanic membrane, ear canal and external ear normal       Nose: Nose normal       Right Sinus: No maxillary sinus tenderness or frontal sinus tenderness  Left Sinus: No maxillary sinus tenderness or frontal sinus tenderness  Mouth/Throat:      Mouth: No oral lesions  Pharynx: No oropharyngeal exudate or posterior oropharyngeal erythema  Cardiovascular:      Rate and Rhythm: Normal rate and regular rhythm  Heart sounds: Normal heart sounds  Pulmonary:      Effort: Pulmonary effort is normal       Breath sounds: Normal breath sounds  Musculoskeletal:         General: Normal range of motion  Cervical back: Neck supple  Lymphadenopathy:      Cervical:      Right cervical: No superficial or posterior cervical adenopathy  Left cervical: No superficial or posterior cervical adenopathy  Skin:     General: Skin is warm and dry     Neurological:      Mental Status: He is alert and oriented to person, place, and time  Psychiatric:         Behavior: Behavior normal          Thought Content:  Thought content normal          Judgment: Judgment normal                      PRIMO Dobbs

## 2022-04-01 NOTE — PATIENT INSTRUCTIONS
Viral Syndrome   AMBULATORY CARE:   Viral syndrome  is a term used for symptoms of an infection caused by a virus  Viruses are spread easily from person to person through the air and on shared items  Signs and symptoms  may start slowly or suddenly and last hours to days  They can be mild to severe and can change over days or hours  You may have any of the following:  · Fever and chills    · A runny or stuffy nose    · Cough, sore throat, or hoarseness    · Headache, or pain and pressure around your eyes    · Muscle aches and joint pain    · Shortness of breath or wheezing    · Abdominal pain, cramps, and diarrhea    · Nausea, vomiting, or loss of appetite    Call your local emergency number (911 in the [address] [facility]) or have someone else call if:   · You have a seizure  · You cannot be woken  · You have chest pain or trouble breathing  Seek care immediately if:   · You have a stiff neck, a bad headache, and sensitivity to light  · You feel weak, dizzy, or confused  · You stop urinating or urinate a lot less than usual     · You cough up blood or thick yellow or green mucus  · You have severe abdominal pain or your abdomen is larger than usual     Call your doctor if:   · Your symptoms do not get better with treatment or get worse after 3 days  · You have a rash or ear pain  · You have burning when you urinate  · You have questions or concerns about your condition or care  Treatment for viral syndrome  may include medicines to manage your symptoms  Antibiotics are not given for a viral infection  You may  need any of the following:  · Acetaminophen  decreases pain and fever  It is available without a doctor's order  Ask how much to take and how often to take it  Follow directions  Read the labels of all other medicines you are using to see if they also contain acetaminophen, or ask your doctor or pharmacist  Acetaminophen can cause liver damage if not taken correctly   Do not use more than 4 grams (4,000 milligrams) total of acetaminophen in one day  · NSAIDs , such as ibuprofen, help decrease swelling, pain, and fever  NSAIDs can cause stomach bleeding or kidney problems in certain people  If you take blood thinner medicine, always ask your healthcare provider if NSAIDs are safe for you  Always read the medicine label and follow directions  · Cold medicine  helps decrease swelling, control a cough, and relieve chest or nasal congestion  · Saline nasal spray  helps decrease nasal congestion  Manage your symptoms:   · Drink liquids as directed to prevent dehydration  Ask how much liquid to drink each day and which liquids are best for you  Ask if you should drink an oral rehydration solution (ORS)  An ORS has the right amounts of water, salts, and sugar you need to replace body fluids  This may help prevent dehydration caused by vomiting or diarrhea  Do not drink liquids with caffeine  Liquids with caffeine can make dehydration worse  · Get plenty of rest to help your body heal   Take naps throughout the day  Ask your healthcare provider when you can return to work and your normal activities  · Use a cool mist humidifier to help you breathe easier  Ask your healthcare provider how to use a cool mist humidifier  · Eat honey or use cough drops for a sore throat  Cough drops are available without a doctor's order  Follow directions for taking cough drops  · Do not smoke or be close to anyone who is smoking  Nicotine and other chemicals in cigarettes and cigars can cause lung damage  Smoking can also delay healing  Ask your healthcare provider for information if you currently smoke and need help to quit  E-cigarettes or smokeless tobacco still contain nicotine  Talk to your healthcare provider before you use these products  Prevent the spread of germs:       · Wash your hands often  Wash your hands several times each day   Wash after you use the bathroom, change a child's diaper, and before you prepare or eat food  Use soap and water every time  Rub your soapy hands together, lacing your fingers  Wash the front and back of your hands, and in between your fingers  Use the fingers of one hand to scrub under the fingernails of the other hand  Wash for at least 20 seconds  Rinse with warm, running water for several seconds  Then dry your hands with a clean towel or paper towel  Use hand  that contains alcohol if soap and water are not available  Do not touch your eyes, nose, or mouth without washing your hands first          · Cover a sneeze or cough  Use a tissue that covers your mouth and nose  Throw the tissue away in a trash can right away  Use the bend of your arm if a tissue is not available  Wash your hands well with soap and water or use a hand   · Stay away from others while you are sick  Avoid crowds as much as possible  · Ask about vaccines you may need  Talk to your healthcare provider about your vaccine history  He or she will tell you which vaccines you need, and when to get them  ? Get the influenza (flu) vaccine as soon as recommended each year  The flu vaccine is available starting in September or October  Flu viruses change, so it is important to get a flu vaccine every year  ? Get the pneumonia vaccine if recommended  This vaccine is usually recommended every 5 years  Your provider will tell you when to get this vaccine, if needed  Follow up with your doctor as directed:  Write down your questions so you remember to ask them during your visits  © [employer] 2022 Information is for End User's use only and may not be sold, redistributed or otherwise used for commercial purposes  All illustrations and images included in CareNotes® are the copyrighted property of [employer]  or [employer]  The above information is an  only  It is not intended as medical advice for individual conditions or treatments  Talk to your doctor, nurse or pharmacist before following any medical regimen to see if it is safe and effective for you

## 2022-04-02 LAB
FLUAV RNA RESP QL NAA+PROBE: POSITIVE
FLUBV RNA RESP QL NAA+PROBE: NEGATIVE
SARS-COV-2 RNA RESP QL NAA+PROBE: NEGATIVE

## 2022-04-04 ENCOUNTER — OFFICE VISIT (OUTPATIENT)
Dept: FAMILY MEDICINE CLINIC | Facility: CLINIC | Age: 52
End: 2022-04-04
Payer: COMMERCIAL

## 2022-04-04 VITALS
HEIGHT: 69 IN | DIASTOLIC BLOOD PRESSURE: 78 MMHG | HEART RATE: 88 BPM | TEMPERATURE: 97.8 F | SYSTOLIC BLOOD PRESSURE: 102 MMHG | RESPIRATION RATE: 98 BRPM | BODY MASS INDEX: 30.51 KG/M2 | WEIGHT: 206 LBS

## 2022-04-04 DIAGNOSIS — N30.01 ACUTE CYSTITIS WITH HEMATURIA: Primary | ICD-10-CM

## 2022-04-04 LAB
SL AMB  POCT GLUCOSE, UA: NEGATIVE
SL AMB LEUKOCYTE ESTERASE,UA: NORMAL
SL AMB POCT BILIRUBIN,UA: NEGATIVE
SL AMB POCT BLOOD,UA: NORMAL
SL AMB POCT CLARITY,UA: NORMAL
SL AMB POCT COLOR,UA: YELLOW
SL AMB POCT KETONES,UA: NEGATIVE
SL AMB POCT NITRITE,UA: POSITIVE
SL AMB POCT PH,UA: 6.5
SL AMB POCT SPECIFIC GRAVITY,UA: 1.03
SL AMB POCT URINE PROTEIN: NORMAL
SL AMB POCT UROBILINOGEN: NORMAL

## 2022-04-04 PROCEDURE — 81003 URINALYSIS AUTO W/O SCOPE: CPT | Performed by: NURSE PRACTITIONER

## 2022-04-04 PROCEDURE — 99213 OFFICE O/P EST LOW 20 MIN: CPT | Performed by: NURSE PRACTITIONER

## 2022-04-04 RX ORDER — SULFAMETHOXAZOLE AND TRIMETHOPRIM 800; 160 MG/1; MG/1
1 TABLET ORAL EVERY 12 HOURS SCHEDULED
Qty: 14 TABLET | Refills: 0 | Status: SHIPPED | OUTPATIENT
Start: 2022-04-04 | End: 2022-04-11

## 2022-04-04 NOTE — PROGRESS NOTES
Assessment/Plan:    1  Acute cystitis with hematuria  -     sulfamethoxazole-trimethoprim (BACTRIM DS) 800-160 mg per tablet; Take 1 tablet by mouth every 12 (twelve) hours for 7 days  -     POCT urine dip auto non-scope  -     Urine culture          Patient Instructions: Take antibiotics until finished with food  Drink plenty of fluids  Follow up advised for persistent urinary symptoms or if you develop flank pain, fevers, nausea, or vomiting  Please call the office if symptoms do not improve after completion of the antibiotics  Supportive care discussed and advised  Advised to RTO for any worsening and no improvement  Follow up for no improvement and worsening of conditions  Patient advised and educated when to see immediate medical care  Return if symptoms worsen or fail to improve  Future Appointments   Date Time Provider Fortino Brower   4/6/2022  8:45 AM PRIMO Lemus [facility]           Subjective:      Patient ID: Eusebia Sanchez is a 46 y o  male  Chief Complaint   Patient presents with    Urinary Tract Infection     mz cma         Vitals:  /78   Pulse 88   Temp 97 8 °F (36 6 °C)   Resp (!) 98   Ht 5' 9" (1 753 m)   Wt 93 4 kg (206 lb)   BMI 30 42 kg/m²     HPI  Patient stated that having burning with urination from couple of days and today noticed small blood in urine  Under care of urologist for prostate issues and have appt scheduled with them on 4/6/2022  Denies any new sexual partners  Been sick with flu and thinks that his fluid intake has not been great  Denies any fever and chills  Stated that smokes very seldom         The following portions of the patient's history were reviewed and updated as appropriate: allergies, current medications, past family history, past medical history, past social history, past surgical history and problem list       Review of Systems   Constitutional: Negative for chills, diaphoresis, fatigue, fever and unexpected weight change  Respiratory: Negative  Cardiovascular: Negative  Gastrointestinal: Negative for abdominal pain, nausea and vomiting  Genitourinary: Positive for difficulty urinating, dysuria and hematuria  Negative for decreased urine volume, flank pain, frequency, genital sores, penile discharge, penile pain, penile swelling, scrotal swelling, testicular pain and urgency  As noted in HPI     Musculoskeletal: Negative  Skin: Negative  Neurological: Negative for dizziness and headaches  Objective:    Social History     Tobacco Use   Smoking Status Never Smoker   Smokeless Tobacco Never Used       Allergies: Allergies   Allergen Reactions    Penicillins     Amoxicillin-Pot Clavulanate Rash         Current Outpatient Medications   Medication Sig Dispense Refill    cetirizine-pseudoephedrine (ZyrTEC-D) 5-120 MG per tablet Take 1 tablet by mouth as needed for allergies   Synthroid 100 MCG tablet take 1 tablet by mouth once daily 90 tablet 1    sulfamethoxazole-trimethoprim (BACTRIM DS) 800-160 mg per tablet Take 1 tablet by mouth every 12 (twelve) hours for 7 days 14 tablet 0     No current facility-administered medications for this visit  Physical Exam  Constitutional:       Appearance: Normal appearance  He is well-developed  Cardiovascular:      Rate and Rhythm: Normal rate and regular rhythm  Heart sounds: Normal heart sounds  Pulmonary:      Effort: Pulmonary effort is normal       Breath sounds: Normal breath sounds  Abdominal:      General: Bowel sounds are normal  There is no distension  Palpations: Abdomen is soft  Tenderness: There is no abdominal tenderness  There is no rebound  Skin:     General: Skin is warm and dry  Neurological:      Mental Status: He is alert and oriented to person, place, and time  Psychiatric:         Behavior: Behavior normal          Thought Content:  Thought content normal          Judgment: Judgment normal            Recent Results (from the past 24 hour(s))   POCT urine dip auto non-scope    Collection Time: 04/04/22  1:27 PM   Result Value Ref Range     COLOR,UA Yellow     CLARITY,UA Cloudy     SPECIFIC GRAVITY,UA 1 030      PH,UA 6 5     LEUKOCYTE ESTERASE,UA Small     NITRITE,UA Positive     GLUCOSE, UA Negative     KETONES,UA Negative     BILIRUBIN,UA Negative     BLOOD,UA Large     POCT URINE PROTEIN 30 mg/dL     SL AMB POCT UROBILINOGEN 0 2 eu/dL                PRIMO Solis

## 2022-04-07 LAB
BACTERIA UR CULT: ABNORMAL
Lab: ABNORMAL
SL AMB ANTIMICROBIAL SUSCEPTIBILITY: ABNORMAL

## 2022-04-08 DIAGNOSIS — E03.9 PRIMARY HYPOTHYROIDISM: ICD-10-CM

## 2022-04-08 LAB
25(OH)D3+25(OH)D2 SERPL-MCNC: 38.1 NG/ML (ref 30–100)
BUN SERPL-MCNC: 21 MG/DL (ref 6–24)
BUN/CREAT SERPL: 15 (ref 9–20)
CALCIUM SERPL-MCNC: 9.3 MG/DL (ref 8.7–10.2)
CHLORIDE SERPL-SCNC: 99 MMOL/L (ref 96–106)
CO2 SERPL-SCNC: 23 MMOL/L (ref 20–29)
CREAT SERPL-MCNC: 1.44 MG/DL (ref 0.76–1.27)
EGFR: 58 ML/MIN/1.73
GLUCOSE SERPL-MCNC: 92 MG/DL (ref 65–99)
MAGNESIUM SERPL-MCNC: 2.2 MG/DL (ref 1.6–2.3)
MICRODELETION SYND BLD/T FISH: NORMAL
PHOSPHATE SERPL-MCNC: 3.2 MG/DL (ref 2.8–4.1)
POTASSIUM SERPL-SCNC: 5 MMOL/L (ref 3.5–5.2)
SODIUM SERPL-SCNC: 140 MMOL/L (ref 134–144)

## 2022-04-08 RX ORDER — LEVOTHYROXINE SODIUM 100 MCG
TABLET ORAL
Qty: 90 TABLET | Refills: 1 | Status: SHIPPED | OUTPATIENT
Start: 2022-04-08

## 2022-04-14 NOTE — PROGRESS NOTES
4/18/2022    Eusebia [facility]  1970  [id number]      Assessment  -Elevated PSA s/p negative prostate biopsy (7/2019)    Discussion/Plan  Colby Fox is a 46 y o  male being managed by Dr Cara Davies  1  Elevated PSA s/p negative prostate biopsy (7/2019)- reviewed results of his recent PSA from 02/01/2022 which was 4 7  Patient states he was recently treated for urinary tract infection and influenza  However, this was after his last PSA test results  There were no abnormal findings noted on digital rectal examination today  Given his persistently elevated PSA, we discussed proceeding with multiparametric MRI of prostate for further evaluation  He is amenable with this plan  Based on results, we will determine the need for MRI fusion prostate biopsy  Follow-up to review results of multiparametric MRI of prostate  He was advised to call sooner with any issues     -All questions answered, patient agrees with plan      History of Present Illness  46 y o  male with a history of elevated PSA presents today for follow up  Patient last seen in the office in August 2019  He has a prior history of elevated PSA which prompted prostate biopsy performed in July 2019  Pathology was negative for malignancy  His PSA had been 4 24  He denies any strong family history of prostate cancer  Patient denies any lower urinary tract symptoms, gross hematuria, or dysuria  No changes to his overall health  Review of Systems  Review of Systems   Constitutional: Negative  HENT: Negative  Respiratory: Negative  Cardiovascular: Negative  Gastrointestinal: Negative  Genitourinary: Negative for decreased urine volume, difficulty urinating, dysuria, flank pain, frequency, hematuria and urgency  Musculoskeletal: Negative  Skin: Negative  Neurological: Negative  Psychiatric/Behavioral: Negative          Past Medical History  Past Medical History:   Diagnosis Date    Hypothyroid     Pollen allergies Past Social History  Past Surgical History:   Procedure Laterality Date    APPENDECTOMY  1985    ARTHROSCOPY KNEE Right 6/30/2016    Procedure: ARTHROSCOPY KNEE, MEDIAL MENISCECTOMY;  Surgeon: Harley Carolina MD;  [facility]: [facility] MAIN OR;  Service:     INJECTION ANESTHETIC AGENT TO CERVICAL PLEXUS      KNEE ARTHROSCOPY Right 1990    OR COLONOSCOPY FLX DX W/COLLJ [facility] WHEN PFRMD N/A 4/22/2019    Procedure: COLONOSCOPY;  Surgeon: Curtis Elaine MD;  Location: [facility] GI LAB; Service: Gastroenterology       Past Family History  Family History   Problem Relation Age of Onset   Karissa Splinter Breast cancer Mother    Karissa Splinter Cancer Sister         3 sisters thyroid CA    No Known Problems Father     Hashimoto's thyroiditis Sister     Melanoma Maternal Aunt        Past Social history  Social History     Socioeconomic History    Marital status: /Civil Union     Spouse name: Not on file    Number of children: Not on file    Years of education: Not on file    Highest education level: Not on file   Occupational History    Not on file   Tobacco Use    Smoking status: Never Smoker    Smokeless tobacco: Never Used   Vaping Use    Vaping Use: Never used   Substance and Sexual Activity    Alcohol use:  Yes     Alcohol/week: 8 0 standard drinks     Types: 8 Cans of beer per week     Comment: socially    Drug use: No    Sexual activity: Not on file   Other Topics Concern    Not on file   Social History Narrative    Not on file     Social Determinants of Health     Financial Resource Strain: Not on file   Food Insecurity: Not on file   Transportation Needs: Not on file   Physical Activity: Not on file   Stress: Not on file   Social Connections: Not on file   Intimate Partner Violence: Not on file   Housing Stability: Not on file       Current Medications  Current Outpatient Medications   Medication Sig Dispense Refill    cetirizine-pseudoephedrine (ZyrTEC-D) 5-120 MG per tablet Take 1 tablet by mouth as needed for allergies   Synthroid 100 MCG tablet take 1 tablet by mouth once daily 90 tablet 1     No current facility-administered medications for this visit  Allergies  Allergies   Allergen Reactions    Penicillins     Amoxicillin-Pot Clavulanate Rash       Past Medical History, Social History, Family History, medications and allergies were reviewed  Vitals  Vitals:    04/18/22 0755   BP: 120/68   Pulse: 88   Weight: 90 3 kg (199 lb)   Height: 5' 9" (1 753 m)       Physical Exam  Physical Exam  Constitutional:       Appearance: Normal appearance  He is well-developed  HENT:      Head: Normocephalic  Eyes:      Pupils: Pupils are equal, round, and reactive to light  Pulmonary:      Effort: Pulmonary effort is normal    Abdominal:      Palpations: Abdomen is soft  Genitourinary:     Prostate: Normal       Rectum: Normal       Comments: Prostate 45 g, smooth, nontender, no nodules  Musculoskeletal:         General: Normal range of motion  Cervical back: Normal range of motion  Skin:     General: Skin is warm and dry  Neurological:      General: No focal deficit present  Mental Status: He is alert and oriented to person, place, and time  Psychiatric:         Mood and Affect: Mood normal          Behavior: Behavior normal          Thought Content:  Thought content normal          Judgment: Judgment normal          Results    I have personally reviewed all pertinent lab results and reviewed with patient  Lab Results   Component Value Date    PSA 4 7 (H) 02/01/2022    PSA 4 0 01/03/2020     Lab Results   Component Value Date    GLUCOSE 92 09/02/2016    CALCIUM 9 2 09/02/2016     09/02/2016    K 5 0 04/07/2022    CO2 23 04/07/2022    CL 99 04/07/2022    BUN 21 04/07/2022    CREATININE 1 44 (H) 04/07/2022     Lab Results   Component Value Date    WBC 5 9 02/01/2022    HGB 16 5 02/01/2022    HCT 48 3 02/01/2022    MCV 97 02/01/2022     02/01/2022     No results found for this or any previous visit (from the past 1 hour(s))

## 2022-04-15 ENCOUNTER — TELEPHONE (OUTPATIENT)
Dept: OTHER | Facility: OTHER | Age: 52
End: 2022-04-15

## 2022-04-15 NOTE — TELEPHONE ENCOUNTER
Called and LM for patient that he has an appointment Monday at [address] [facility] in [place]  Office address provided

## 2022-04-18 ENCOUNTER — OFFICE VISIT (OUTPATIENT)
Dept: UROLOGY | Facility: AMBULATORY SURGERY CENTER | Age: 52
End: 2022-04-18
Payer: COMMERCIAL

## 2022-04-18 VITALS
HEART RATE: 88 BPM | DIASTOLIC BLOOD PRESSURE: 68 MMHG | BODY MASS INDEX: 29.47 KG/M2 | SYSTOLIC BLOOD PRESSURE: 120 MMHG | HEIGHT: 69 IN | WEIGHT: 199 LBS

## 2022-04-18 DIAGNOSIS — R97.20 ELEVATED PSA: ICD-10-CM

## 2022-04-18 PROCEDURE — 99213 OFFICE O/P EST LOW 20 MIN: CPT | Performed by: NURSE PRACTITIONER

## 2022-04-18 PROCEDURE — 1036F TOBACCO NON-USER: CPT | Performed by: NURSE PRACTITIONER

## 2022-04-18 PROCEDURE — 3008F BODY MASS INDEX DOCD: CPT | Performed by: NURSE PRACTITIONER

## 2022-05-05 ENCOUNTER — OFFICE VISIT (OUTPATIENT)
Dept: FAMILY MEDICINE CLINIC | Facility: CLINIC | Age: 52
End: 2022-05-05
Payer: COMMERCIAL

## 2022-05-05 VITALS
SYSTOLIC BLOOD PRESSURE: 120 MMHG | HEART RATE: 60 BPM | WEIGHT: 208 LBS | RESPIRATION RATE: 16 BRPM | TEMPERATURE: 97.5 F | HEIGHT: 69 IN | DIASTOLIC BLOOD PRESSURE: 76 MMHG | BODY MASS INDEX: 30.81 KG/M2

## 2022-05-05 DIAGNOSIS — Z12.11 SCREENING FOR COLON CANCER: ICD-10-CM

## 2022-05-05 DIAGNOSIS — N52.9 ERECTILE DYSFUNCTION, UNSPECIFIED ERECTILE DYSFUNCTION TYPE: ICD-10-CM

## 2022-05-05 DIAGNOSIS — M25.531 RIGHT WRIST PAIN: Primary | ICD-10-CM

## 2022-05-05 DIAGNOSIS — J01.90 ACUTE SINUSITIS, RECURRENCE NOT SPECIFIED, UNSPECIFIED LOCATION: ICD-10-CM

## 2022-05-05 PROCEDURE — 99214 OFFICE O/P EST MOD 30 MIN: CPT | Performed by: FAMILY MEDICINE

## 2022-05-05 PROCEDURE — 3008F BODY MASS INDEX DOCD: CPT | Performed by: FAMILY MEDICINE

## 2022-05-05 PROCEDURE — 1036F TOBACCO NON-USER: CPT | Performed by: FAMILY MEDICINE

## 2022-05-05 RX ORDER — TADALAFIL 10 MG/1
10 TABLET ORAL DAILY PRN
Qty: 10 TABLET | Refills: 0 | Status: SHIPPED | OUTPATIENT
Start: 2022-05-05 | End: 2022-05-24 | Stop reason: SDUPTHER

## 2022-05-05 RX ORDER — NAPROXEN 500 MG/1
500 TABLET ORAL 2 TIMES DAILY WITH MEALS
Qty: 20 TABLET | Refills: 0 | Status: SHIPPED | OUTPATIENT
Start: 2022-05-05 | End: 2022-05-15

## 2022-05-05 NOTE — PROGRESS NOTES
Assessment/Plan:       Diagnoses and all orders for this visit:    Right wrist pain  Comments:  Likely sprain  Counseled on wrist brace, ice to the area, gentle stretches and naproxen  If symptoms worsen or persist, will get imaging  Orders:  -     naproxen (NAPROSYN) 500 mg tablet; Take 1 tablet (500 mg total) by mouth 2 (two) times a day with meals for 10 days    Erectile dysfunction, unspecified erectile dysfunction type  -     tadalafil (CIALIS) 10 MG tablet; Take 1 tablet (10 mg total) by mouth daily as needed for erectile dysfunction    Screening for colon cancer  -     Ambulatory referral for colonoscopy; Future    Acute sinusitis, recurrence not specified, unspecified location  Comments:  Symptoms improving  Continue Mucinex  Start flonase  If they worsen, can start antibiotic  Subjective:      Patient ID: Katerin Templeton is a 46 y o  male  Wrist Pain   The pain is present in the right wrist  This is a new problem  Episode onset: 2 weeks  History of extremity trauma: started after bowling  The problem occurs constantly  The problem has been unchanged  The quality of the pain is described as sharp and aching  The pain is at a severity of 7/10  Pertinent negatives include no fever, inability to bear weight, itching, joint locking, joint swelling, limited range of motion, numbness, stiffness or tingling  He has tried nothing for the symptoms  Sinus Problem  This is a new problem  The current episode started in the past 7 days  The problem has been gradually improving since onset  There has been no fever  Associated symptoms include congestion, headaches, neck pain, shortness of breath, sinus pressure, sneezing and swollen glands  Pertinent negatives include no chills, coughing, diaphoresis, ear pain, hoarse voice or sore throat  Past treatments include oral decongestants (zyrtec)  The treatment provided no relief         The following portions of the patient's history were reviewed and updated as appropriate: allergies, current medications, past family history, past medical history, past social history, past surgical history and problem list     Review of Systems   Constitutional: Negative for chills, diaphoresis and fever  HENT: Positive for congestion, sinus pressure and sneezing  Negative for ear pain, hoarse voice and sore throat  Respiratory: Positive for shortness of breath  Negative for cough  Musculoskeletal: Positive for neck pain  Negative for stiffness  Skin: Negative for itching  Neurological: Positive for headaches  Negative for tingling and numbness  Objective:      /76   Pulse 60   Temp 97 5 °F (36 4 °C)   Resp 16   Ht 5' 9" (1 753 m)   Wt 94 3 kg (208 lb)   BMI 30 72 kg/m²          Physical Exam  Constitutional:       General: He is not in acute distress  Appearance: He is well-developed  He is not diaphoretic  HENT:      Head: Normocephalic and atraumatic  Right Ear: Tympanic membrane and ear canal normal  No drainage, swelling or tenderness  No middle ear effusion  Tympanic membrane is not erythematous  Left Ear: Tympanic membrane and ear canal normal  No drainage, swelling or tenderness  No middle ear effusion  Tympanic membrane is not erythematous  Nose: No congestion or rhinorrhea  Mouth/Throat:      Mouth: Mucous membranes are moist  No oral lesions  Pharynx: No pharyngeal swelling, oropharyngeal exudate, posterior oropharyngeal erythema or uvula swelling  Eyes:      General: No scleral icterus  Right eye: No discharge  Left eye: No discharge  Conjunctiva/sclera: Conjunctivae normal    Cardiovascular:      Rate and Rhythm: Normal rate and regular rhythm  Pulses: Normal pulses  Pulmonary:      Effort: Pulmonary effort is normal  No respiratory distress  Breath sounds: Normal breath sounds  No stridor  No wheezing, rhonchi or rales  Chest:      Chest wall: No tenderness     Musculoskeletal: General: Normal range of motion  Right wrist: No swelling, deformity, effusion, lacerations, tenderness, bony tenderness, snuff box tenderness or crepitus  Normal range of motion  Normal pulse  Left wrist: Normal       Cervical back: Normal range of motion  Skin:     General: Skin is warm  Neurological:      Mental Status: He is alert and oriented to person, place, and time  Psychiatric:         Mood and Affect: Mood normal          Behavior: Behavior normal          Thought Content:  Thought content normal          Judgment: Judgment normal

## 2022-05-23 NOTE — TELEPHONE ENCOUNTER
Patient is scheduled for colonoscopy on September 6 , 2022 at [facility]  with Greta Schwartz MD  Patient is aware of pre-procedure prep of Miralax/ Magnesium Citrate and they will be called the day prior between 2 and 6 pm for time to report for procedure  Pre-procedure prep has been given to the patient  via [address] mail on May 23, 2022

## 2022-05-24 DIAGNOSIS — N52.9 ERECTILE DYSFUNCTION, UNSPECIFIED ERECTILE DYSFUNCTION TYPE: ICD-10-CM

## 2022-05-24 RX ORDER — TADALAFIL 10 MG/1
10 TABLET ORAL DAILY PRN
Qty: 10 TABLET | Refills: 0 | Status: SHIPPED | OUTPATIENT
Start: 2022-05-24 | End: 2022-06-10 | Stop reason: SDUPTHER

## 2022-06-10 DIAGNOSIS — N52.9 ERECTILE DYSFUNCTION, UNSPECIFIED ERECTILE DYSFUNCTION TYPE: ICD-10-CM

## 2022-06-10 RX ORDER — TADALAFIL 10 MG/1
10 TABLET ORAL DAILY PRN
Qty: 10 TABLET | Refills: 0 | Status: SHIPPED | OUTPATIENT
Start: 2022-06-10 | End: 2022-06-28 | Stop reason: SDUPTHER

## 2022-06-28 DIAGNOSIS — N52.9 ERECTILE DYSFUNCTION, UNSPECIFIED ERECTILE DYSFUNCTION TYPE: ICD-10-CM

## 2022-06-28 RX ORDER — TADALAFIL 10 MG/1
10 TABLET ORAL DAILY PRN
Qty: 10 TABLET | Refills: 0 | Status: SHIPPED | OUTPATIENT
Start: 2022-06-28 | End: 2022-07-14 | Stop reason: SDUPTHER

## 2022-07-08 ENCOUNTER — HOSPITAL ENCOUNTER (OUTPATIENT)
Dept: RADIOLOGY | Age: 52
Discharge: HOME | End: 2022-07-08
Payer: COMMERCIAL

## 2022-07-08 DIAGNOSIS — R97.20 ELEVATED PSA: ICD-10-CM

## 2022-07-08 PROCEDURE — 76377 3D RENDER W/INTRP POSTPROCES: CPT

## 2022-07-08 PROCEDURE — 72197 MRI PELVIS W/O & W/DYE: CPT

## 2022-07-08 PROCEDURE — G1004 CDSM NDSC: HCPCS

## 2022-07-08 PROCEDURE — A9585 GADOBUTROL INJECTION: HCPCS | Performed by: NURSE PRACTITIONER

## 2022-07-08 RX ADMIN — GADOBUTROL 9 ML: 604.72 INJECTION INTRAVENOUS at 08:32

## 2022-07-14 ENCOUNTER — TELEPHONE (OUTPATIENT)
Dept: UROLOGY | Facility: AMBULATORY SURGERY CENTER | Age: 52
End: 2022-07-14

## 2022-07-14 DIAGNOSIS — N52.9 ERECTILE DYSFUNCTION, UNSPECIFIED ERECTILE DYSFUNCTION TYPE: ICD-10-CM

## 2022-07-14 DIAGNOSIS — R97.20 ELEVATED PSA: Primary | ICD-10-CM

## 2022-07-14 NOTE — TELEPHONE ENCOUNTER
----- Message from [id number] Jarod Danielle sent at 7/14/2022  8:17 AM EDT -----  Please inform patient results of multiparametric MRI of prostate revealed PI-RADS 2, low risk of prostate cancer  Prostate measured 46 cc  Recommend follow-up in 6 months with PSA

## 2022-07-14 NOTE — TELEPHONE ENCOUNTER
Spoke to patient to advise of AP's note  Patient would like a letter in the mail as a reminder to schedule his appointment in 6 months  Patient will also like to have PSA script mailed to him  Address confirmed  Advised to call the office in the meantime with any questions or concerns

## 2022-07-14 NOTE — LETTER
Lisa Parrish,               This letter is being to sent to you as a reminder to schedule a follow up appointment in 6 months ( January 2023) with a PSA, which is blood work  Along with this letter, is the script for the PSA you will need to have done before your appointment that will be       scheduled  To schedule your follow up appointment, please call [phone]  Feel free to call us with any         questions or concerns                 Thank you,    [facility]

## 2022-07-15 RX ORDER — TADALAFIL 10 MG/1
10 TABLET ORAL DAILY PRN
Qty: 10 TABLET | Refills: 0 | Status: SHIPPED | OUTPATIENT
Start: 2022-07-15 | End: 2022-10-05

## 2022-09-09 ENCOUNTER — TELEPHONE (OUTPATIENT)
Dept: GASTROENTEROLOGY | Facility: AMBULARY SURGERY CENTER | Age: 52
End: 2022-09-09

## 2022-09-09 NOTE — TELEPHONE ENCOUNTER
Pt called the [address] office to reschedule a colonoscopy he had cxd in the past   Patient is scheduled for colonoscopy on December 12 , 2022 at [facility]  with Rylie Gaytan MD  Patient is aware of pre-procedure prep of Miralax/Dulcolax and they will be called the day prior between 2 and 6 pm for time to report for procedure  Pre-procedure prep has been given to the patient  via E-mail on September 9 , 2022   PT STATES HE HAS EMAILED BOWEL PREP INSTRUCTIONS "FROM BEFORE" PER PT

## 2022-09-13 ENCOUNTER — OFFICE VISIT (OUTPATIENT)
Dept: DERMATOLOGY | Age: 52
End: 2022-09-13
Payer: COMMERCIAL

## 2022-09-13 VITALS — BODY MASS INDEX: 31.25 KG/M2 | HEIGHT: 69 IN | WEIGHT: 211 LBS | TEMPERATURE: 97.2 F

## 2022-09-13 DIAGNOSIS — L57.0 ACTINIC KERATOSIS: ICD-10-CM

## 2022-09-13 DIAGNOSIS — L72.0 MILIA: ICD-10-CM

## 2022-09-13 DIAGNOSIS — D17.1 LIPOMA OF TORSO: Primary | ICD-10-CM

## 2022-09-13 PROCEDURE — 99214 OFFICE O/P EST MOD 30 MIN: CPT | Performed by: DERMATOLOGY

## 2022-09-13 PROCEDURE — 17003 DESTRUCT PREMALG LES 2-14: CPT | Performed by: DERMATOLOGY

## 2022-09-13 PROCEDURE — 17000 DESTRUCT PREMALG LESION: CPT | Performed by: DERMATOLOGY

## 2022-09-13 NOTE — PROGRESS NOTES
[facility] Follow Up Note    Patient Name: Shelby Oppenheim  Encounter Date: 09/13/2022    Today's Chief Concerns:  Venita Leisure Concern #1:  Growth on back    Concern #2:  Few spots on face       Current Medications:    Current Outpatient Medications:     Synthroid 100 MCG tablet, take 1 tablet by mouth once daily, Disp: 90 tablet, Rfl: 1    cetirizine-pseudoephedrine (ZyrTEC-D) 5-120 MG per tablet, Take 1 tablet by mouth as needed for allergies  (Patient not taking: Reported on 9/13/2022), Disp: , Rfl:     naproxen (NAPROSYN) 500 mg tablet, Take 1 tablet (500 mg total) by mouth 2 (two) times a day with meals for 10 days, Disp: 20 tablet, Rfl: 0    tadalafil (CIALIS) 10 MG tablet, Take 1 tablet (10 mg total) by mouth daily as needed for erectile dysfunction (Patient not taking: Reported on 9/13/2022), Disp: 10 tablet, Rfl: 0    CONSTITUTIONAL:   Vitals:    09/13/22 0902   Temp: (!) 97 2 °F (36 2 °C)   TempSrc: Temporal   Weight: 95 7 kg (211 lb)   Height: 5' 9" (1 753 m)          Specific Alerts:    Have you been seen by a [facility]ermatologist in the last 3 years? YES      Allergies   Allergen Reactions    Penicillins     Amoxicillin-Pot Clavulanate Rash       May we call your Preferred Phone number to discuss your specific medical information? YES    May we leave a detailed message that includes your specific medical information? YES    Have you traveled outside of the [facility] in the past 3 months? No    Do you currently have a pacemaker or defibrillator? No    Do you have any artificial heart valves, joints, plates, screws, rods, stents, pins, etc? No   - If Yes, were any placed within the last 2 years? Do you require any medications prior to a surgical procedure? No    Are you taking any medications that cause you to bleed more easily ("blood thinners") No    Have you ever experienced a rapid heartbeat with epinephrine?  No    Have you ever been treated with "gold" (gold sodium thiomalate) therapy? No    [facility] can help with wrinkles, "laugh lines," facial volume loss, "double chin," "love handles," age spots, and more  Are you interested in learning today about some of the skin enhancement procedures that we offer? (If Yes, please provide more detail) No    Review of Systems:  Have you recently had or currently have any of the following?     · Fever or chills: No  · Night Sweats: No  · Headaches: No  · Weight Gain: No  · Weight Loss: No  · Blurry Vision: No  · Nausea: No  · Vomiting: No  · Diarrhea: No  · Blood in Stool: No  · Abdominal Pain: No  · Itchy Skin: No  · Painful Joints: No  · Swollen Joints: No  · Muscle Pain: No  · Irregular Mole: No  · Sun Burn: No  · Dry Skin: No  · Skin Color Changes: No  · Scar or Keloid: No  · Cold Sores/Fever Blisters: No  · Bacterial Infections/MRSA: No  · Anxiety: No  · Depression: No  · Suicidal or Homicidal Thoughts: No      PSYCH: Normal mood and affect  EYES: Normal conjunctiva  ENT: Normal lips and oral mucosa  CARDIOVASCULAR: No edema  RESPIRATORY: Normal respirations  HEME/LYMPH/IMMUNO:  No regional lymphadenopathy except as noted below in ASSESSMENT AND PLAN BY DIAGNOSIS    FULL ORGAN SYSTEM SKIN EXAM (SKIN)  Hair, Scalp, Ears, Face Normal except as noted below in Assessment   Neck, Cervical Chain Nodes Normal except as noted below in Assessment   Right Arm/Hand/Fingers Normal except as noted below in Assessment   Left Arm/Hand/Fingers Normal except as noted below in Assessment   Chest/Breasts/Axillae Viewed areas Normal except as noted below in Assessment   Abdomen, Umbilicus Normal except as noted below in Assessment   Back/Spine Normal except as noted below in Assessment   Groin/Genitalia/Buttocks Viewed areas Normal except as noted below in Assessment   Right Leg, Foot, Toes Normal except as noted below in Assessment   Left Leg, Foot, Toes Normal except as noted below in Assessment       LIPOMA    Physical Exam:   Anatomic Location Affected:  Back    Morphological Description:  6 x 4 5 cm soft subcutaneous nodule    Pertinent Positives:   Pertinent Negatives: Additional History of Present Condition:  Present for a couple years  Jeanette Ruiz started to increase in size     Assessment and Plan:  Based on a thorough discussion of this condition and the management approach to it (including a comprehensive discussion of the known risks, side effects and potential benefits of treatment), the patient (family) agrees to implement the following specific plan:   Removal discussed if becomes bothersome    Excision recommended through general surgeon (referral in chart)   Injectable and liposuction discussed through plastics     Lipoma  A lipoma is a non-cancerous tumor that is made up of fat cells  It slowly grows under the skin in the subcutaneous tissue  A person may have a single lipoma or may have many lipomas  They are very common  Lipomas can occur in people of all ages, however, they tend to develop in adulthood and are most noticeable during middle age  They affect both sexes equally, although solitary lipomas are more common in women whilst multiple lipomas occur more frequently in men  The cause of lipomas is unknown  It is possible there may be genetic involvement as many patients with lipomas come from a family with a history of these tumors  Sometimes an injury such as a blunt blow to part of the body may trigger growth of a lipoma  People are often unaware of lipomas until they have grown large enough to become visible and palpable  This growth occurs slowly over several years   Some features of lipomas include:   A dome-shaped or egg-shaped lump about 2-10 cm in diameter (some may grow even larger)    It feels soft and smooth and is easily moved under the skin with the fingers    Some have a rubbery or doughy consistency    They are most common on the shoulders, neck, trunk and arms, but they can occur anywhere on the body where fat tissue is present  Most lipomas are symptomless, but some are painful on applying pressure  Lipomas that are tender or painful are usually angiolipomas  This means the lipoma has an increased number of small blood vessels  Painful lipomas are also a feature of adiposis dolorosa or Dercum disease  Diagnosis of lipoma is usually made clinically by finding a soft lump under the skin  However, if there is any doubt, a deep skin biopsy can be performed which will show typical histopathological features of lipoma and its variants  Most lipomas require no treatment  Most lipomas eventually stop growing and remain indefinitely without causing any problems  Occasionally, lipomas that interfere with the movement of adjacent muscles may require surgical removal  Several methods are available:   Simple surgical excision    Squeeze technique (a small incision is made over the lipoma and the fatty tissue is squeezed through the hole)    Liposuction    ACTINIC KERATOSIS    Physical Exam:   Anatomic Location Affected:  Left cheek, scalp    Morphological Description:  Pink scaly papules x2     Additional History of Present Condition:  Sun exposure over the years     Assessment and Plan:  Based on a thorough discussion of this condition and the management approach to it (including a comprehensive discussion of the known risks, side effects and potential benefits of treatment), the patient (family) agrees to implement the following specific plan:     Cryotherapy done in office today    Areas will become red and puffy  Scab up and fall off in 2-3 weeks   Advised to continue daily moisturizer with sunscreen     Actinic keratoses are very common on sites repeatedly exposed to the sun, especially the backs of the hands and the face, most often affecting the ears, nose, cheeks, upper lip, vermilion of the lower lip, temples, forehead and balding scalp   In severely chronically sun-damaged individuals, they may also be found on the upper trunk, upper and lower limbs, and dorsum of feet  We discussed the theoretical premalignant (pre-cancerous) nature and etiology of these growths  We discussed the prevailing notion that actinic keratoses are a reflection of abnormal skin cell development due to DNA damage by short wavelength UVB  They are more likely to appear if the immune function is poor, due to aging, recent sun exposure, predisposing disease or certain drugs  We discussed that the main concern is that actinic keratoses may predispose to squamous cell carcinoma  It is rare for a solitary actinic keratosis to evolve to squamous cell carcinoma (SCC), but the risk of SCC occurring at some stage in a patient with more than 10 actinic keratoses is thought to be about 10 to 15%  A tender, thickened, ulcerated or enlarging actinic keratosis is suspicious of SCC  Actinic keratoses may be prevented by strict sun protection  If already present, keratoses may improve with a very high sun protection factor (50+) broad-spectrum sunscreen applied at least daily to affected areas, year-round  We recommend that UPF-rated clothing and hats and sunglasses be worn whenever possible and that a sunscreen-moisturizer combination product such as Neutrogena Daily Defense be applied at least three times a day  We performed a thorough discussion of treatment options and specific risk/benefits/alternatives including but not limited to medical field treatment with medications such as the following:     Topical field area medications such as 5-fluorouracil or Aldara (specifically, the trouble with long-term compliance, blistering and local skin reaction versus the convenience of at-home therapy and that field therapy gets what is not yet seen)       Cryotherapy (specifically, local pain, scarring, dyspigmentation, blistering, possible superinfection, and treats only what we see versus directed treatment today)   Photodynamic therapy (specifically, local pain, scarring, dyspigmentation, blistering, possible superinfection, need to schedule for a later date, and time spent in the office versus field therapy that gets what is not yet seen)  MILIUM     Physical Exam:   Anatomic Location Affected:  Right and left cheek    Morphological Description:  White papules   Pertinent Positives:   Pertinent Negatives: Additional History of Present Condition:  Patient concerned about new growths on face     Assessment and Plan:  Based on a thorough discussion of this condition and the management approach to it (including a comprehensive discussion of the known risks, side effects and potential benefits of treatment), the patient (family) agrees to implement the following specific plan:   Reassured benign    Removal discussed if becomes bothersome $20 each (cosmetic)     Over the counter Differin (adapalene) gel recommended   Assessment and Plan  A milium is a small cyst containing keratin (the skin protein); they are usually multiple and are then known as milia  These harmless cysts present as tiny pearly-white bumps just under the surface of the skin  Milia are common in all ages and both sexes  They most often arise on the face and are particularly prominent on the eyelids and cheeks, but they may occur elsewhere  There are various kinds of milia    milia: Affect 40-50% of  babies, few to numerous lesions, often seen on the nose, but may also arise inside the mouth on the mucosa (Saranya pearls) or palate (Rafia nodules) or more widely on the scalp, face and upper trunk, Heal spontaneously within a few weeks of birth   Primary milia in children and adults: found around eyelids, cheeks, forehead and genitalia, in young children, a row of milia may appear along the nasal crease, may clear in a few weeks or persist for months or longer       Juvenile milia: associated with Rombo syndrome, basal cell naevus syndrome, Vjrlm-Wbbrb-Lrkjgecb syndrome, pachyonychia congenita, Vora syndrome and other genetic disorders, may be congenital (present at birth) or appear later in life   Milia en plaque: multiple milia appear on within an inflamed plaque up to several centimeters in diameter, usually found on an eyelid, behind the ear, on a cheek or jaw  3  Affect children and adults, especially middle-aged women  4  Sometimes associated with another skin disease including pseudoxanthoma elasticum, discoid lupus erythematosus, lichen planus   Multiple eruptive milia: crops of numerous milia appear over a few weeks to months, lesions may be asymptomatic or itchy, most often affect the face, upper arms and upper trunk   Traumatic milia: occur at the site of injury as skin heals, arise from eccrine sweat ducts, examples include thermal burns, dermabrasion, blistering rashes such as bullous pemphigoid, often seen on the back of hands and fingers in porphyria cutanea tarda, a milia-like calcified nodule may develop after  heel stick blood test     Milia associated with drugs: may rarely follow the use of topical medication, such as phenols, hydroquinone, 5-fluorouracil cream, and a corticosteroid  Milia have a characteristic appearance  However, on occasion, a skin biopsy may be performed  This shows a small epidermoid cyst coming from a vellus hair follicle  Milia should be distinguished from other types of cyst, comedones, xanthelasma and syringomas  Colloid milia are vazquez coloured bumps on cheeks and temples associated with excessive exposure to sunlight  They should also be distinguished from milia-like cysts noted on dermoscopy in seborrhoeic keratoses, papillomatous moles and some basal cell carcinomas  Milia do not need to be treated unless they are a cause for concern for the patient  They often clear up by themselves within a few months   Where possible, further trauma should be minimised to reduce the development of new lesions   The lesion may be de-roofed using a sterile needle or blade and the contents squeezed or pricked out   They may be destroyed using diathermy and curettage, or cryotherapy   For widespread lesions, topical retinoids may be helpful   Chemical peels, dermabrasion and laser ablation have been reported to be effective when used for very extensive milia   Milia en plaque may improve with minocycline (a tetracycline antibiotic)  PROCEDURE:  DESTRUCTION OF PRE-MALIGNANT LESIONS  After a thorough discussion of treatment options and risk/benefits/alternatives (including but not limited to local pain, scarring, dyspigmentation, blistering, and possible superinfection), verbal and written consent were obtained and the aforementioned lesions were treated on with cryotherapy using liquid nitrogen x 1 cycle for 5-10 seconds   TOTAL NUMBER of 2 pre-malignant lesions were treated today on the ANATOMIC LOCATION: scalp and cheek   The patient tolerated the procedure well, and after-care instructions were provided      Scribe Attestation    I,:  Minnie Damon am acting as a scribe while in the presence of the attending physician :       I,:  Freeman Hugo MD personally performed the services described in this documentation    as scribed in my presence :

## 2022-09-13 NOTE — PATIENT INSTRUCTIONS
ACTINIC KERATOSIS    Assessment and Plan:  Based on a thorough discussion of this condition and the management approach to it (including a comprehensive discussion of the known risks, side effects and potential benefits of treatment), the patient (family) agrees to implement the following specific plan:    Cryotherapy done in office today   Areas will become red and puffy  Scab up and fall off in 2-3 weeks  Advised to continue daily moisturizer with sunscreen   Follow up yearly     Actinic keratoses are very common on sites repeatedly exposed to the sun, especially the backs of the hands and the face, most often affecting the ears, nose, cheeks, upper lip, vermilion of the lower lip, temples, forehead and balding scalp  In severely chronically sun-damaged individuals, they may also be found on the upper trunk, upper and lower limbs, and dorsum of feet  MILIUM     Assessment and Plan:  Based on a thorough discussion of this condition and the management approach to it (including a comprehensive discussion of the known risks, side effects and potential benefits of treatment), the patient (family) agrees to implement the following specific plan:  Reassured benign   Removal discussed if becomes bothersome $20 each (cosmetic)  Over the counter Differin (adapalene) gel recommended  Facial discussed and recommended     Assessment and Plan  A milium is a small cyst containing keratin (the skin protein); they are usually multiple and are then known as milia  These harmless cysts present as tiny pearly-white bumps just under the surface of the skin  Milia are common in all ages and both sexes  They most often arise on the face and are particularly prominent on the eyelids and cheeks, but they may occur elsewhere  There are various kinds of milia        LIPOMA    Assessment and Plan:  Based on a thorough discussion of this condition and the management approach to it (including a comprehensive discussion of the known risks, side effects and potential benefits of treatment), the patient (family) agrees to implement the following specific plan:  Removal discussed if becomes bothersome   Excision recommended through general surgeon (referral in chart)  Injectable and liposuction discussed through plastics     Lipoma  A lipoma is a non-cancerous tumor that is made up of fat cells  It slowly grows under the skin in the subcutaneous tissue  A person may have a single lipoma or may have many lipomas  They are very common  Lipomas can occur in people of all ages, however, they tend to develop in adulthood and are most noticeable during middle age  They affect both sexes equally, although solitary lipomas are more common in women whilst multiple lipomas occur more frequently in men

## 2022-09-21 ENCOUNTER — CONSULT (OUTPATIENT)
Dept: SURGERY | Facility: CLINIC | Age: 52
End: 2022-09-21
Payer: COMMERCIAL

## 2022-09-21 ENCOUNTER — APPOINTMENT (OUTPATIENT)
Dept: LAB | Age: 52
End: 2022-09-21
Payer: COMMERCIAL

## 2022-09-21 ENCOUNTER — PREP FOR PROCEDURE (OUTPATIENT)
Dept: SURGERY | Facility: CLINIC | Age: 52
End: 2022-09-21

## 2022-09-21 VITALS
HEART RATE: 77 BPM | BODY MASS INDEX: 30.21 KG/M2 | SYSTOLIC BLOOD PRESSURE: 130 MMHG | WEIGHT: 204 LBS | HEIGHT: 69 IN | DIASTOLIC BLOOD PRESSURE: 83 MMHG

## 2022-09-21 DIAGNOSIS — R22.2 MASS ON BACK: ICD-10-CM

## 2022-09-21 DIAGNOSIS — D17.1 LIPOMA OF TORSO: ICD-10-CM

## 2022-09-21 DIAGNOSIS — R22.2 MASS ON BACK: Primary | ICD-10-CM

## 2022-09-21 LAB
ANION GAP SERPL CALCULATED.3IONS-SCNC: 6 MMOL/L (ref 4–13)
BUN SERPL-MCNC: 25 MG/DL (ref 5–25)
CALCIUM SERPL-MCNC: 9.3 MG/DL (ref 8.3–10.1)
CHLORIDE SERPL-SCNC: 106 MMOL/L (ref 96–108)
CO2 SERPL-SCNC: 26 MMOL/L (ref 21–32)
CREAT SERPL-MCNC: 1.78 MG/DL (ref 0.6–1.3)
ERYTHROCYTE [DISTWIDTH] IN BLOOD BY AUTOMATED COUNT: 12.5 % (ref 11.6–15.1)
GFR SERPL CREATININE-BSD FRML MDRD: 42 ML/MIN/1.73SQ M
GLUCOSE SERPL-MCNC: 82 MG/DL (ref 65–140)
HCT VFR BLD AUTO: 47.4 % (ref 36.5–49.3)
HGB BLD-MCNC: 16.3 G/DL (ref 12–17)
MCH RBC QN AUTO: 32.8 PG (ref 26.8–34.3)
MCHC RBC AUTO-ENTMCNC: 34.4 G/DL (ref 31.4–37.4)
MCV RBC AUTO: 95 FL (ref 82–98)
PLATELET # BLD AUTO: 207 THOUSANDS/UL (ref 149–390)
PMV BLD AUTO: 11.8 FL (ref 8.9–12.7)
POTASSIUM SERPL-SCNC: 3.8 MMOL/L (ref 3.5–5.3)
RBC # BLD AUTO: 4.97 MILLION/UL (ref 3.88–5.62)
SODIUM SERPL-SCNC: 138 MMOL/L (ref 135–147)
WBC # BLD AUTO: 8.85 THOUSAND/UL (ref 4.31–10.16)

## 2022-09-21 PROCEDURE — 85027 COMPLETE CBC AUTOMATED: CPT

## 2022-09-21 PROCEDURE — 80048 BASIC METABOLIC PNL TOTAL CA: CPT

## 2022-09-21 PROCEDURE — 36415 COLL VENOUS BLD VENIPUNCTURE: CPT

## 2022-09-21 PROCEDURE — 99203 OFFICE O/P NEW LOW 30 MIN: CPT | Performed by: SURGERY

## 2022-09-21 NOTE — H&P (VIEW-ONLY)
Assessment/Plan:    Diagnoses and all orders for this visit:    Lipoma of torso  -     Ambulatory Referral to General Surgery    Discussed excision of upper back lipomatous mass  We will plan for outpatient procedure under sedation given the size of it  He understands risks and benefits including potential seroma formation and agrees to proceed    Subjective:      Patient ID: Dale Moreno is a 45 y o  male  Patient presents for evaluation of a lump on his upper back  He has had the lump for 10+ years  The lump has increased in size  Denies pain  The following portions of the patient's history were reviewed and updated as appropriate:     He  has a past medical history of Hypothyroid and Pollen allergies  He  has a past surgical history that includes Appendectomy (1985); Knee arthroscopy (Right, 1990); Injection anesthetic agent to cervical plexus; ARTHROSCOPY KNEE (Right, 6/30/2016); and pr colonoscopy flx dx w/collj spec when pfrmd (N/A, 4/22/2019)  His family history includes Breast cancer in his mother; Cancer in his sister; Hashimoto's thyroiditis in his sister; Melanoma in his maternal aunt; No Known Problems in his father  He  reports that he has never smoked  He has never used smokeless tobacco  He reports current alcohol use of about 8 0 standard drinks of alcohol per week  He reports that he does not use drugs  Current Outpatient Medications   Medication Sig Dispense Refill    cetirizine-pseudoephedrine (ZyrTEC-D) 5-120 MG per tablet Take 1 tablet by mouth as needed for allergies   (Patient not taking: Reported on 9/13/2022)      naproxen (NAPROSYN) 500 mg tablet Take 1 tablet (500 mg total) by mouth 2 (two) times a day with meals for 10 days 20 tablet 0    Synthroid 100 MCG tablet take 1 tablet by mouth once daily 90 tablet 1    tadalafil (CIALIS) 10 MG tablet Take 1 tablet (10 mg total) by mouth daily as needed for erectile dysfunction (Patient not taking: Reported on 9/13/2022) 10 tablet 0     No current facility-administered medications for this visit  He is allergic to penicillins and amoxicillin-pot clavulanate       Review of Systems   All other systems reviewed and are negative  Objective:      /83   Pulse 77   Ht 5' 9" (1 753 m)   Wt 92 5 kg (204 lb)   BMI 30 13 kg/m²          Physical Exam  Constitutional:       General: He is not in acute distress  HENT:      Head: Atraumatic  Mouth/Throat:      Mouth: Mucous membranes are moist    Eyes:      Extraocular Movements: Extraocular movements intact  Cardiovascular:      Rate and Rhythm: Normal rate  Pulmonary:      Effort: Pulmonary effort is normal    Musculoskeletal:      Cervical back: Normal range of motion  Skin:     General: Skin is warm and dry  Comments: 7 x 5 cm soft tissue upper back subcutaneous mass  No skin changes  No signs of infection  Clinically appears to be a lipoma   Neurological:      Mental Status: He is alert         extremities: No edema

## 2022-09-21 NOTE — PROGRESS NOTES
Assessment/Plan:    Diagnoses and all orders for this visit:    Lipoma of torso  -     Ambulatory Referral to General Surgery    Discussed excision of upper back lipomatous mass  We will plan for outpatient procedure under sedation given the size of it  He understands risks and benefits including potential seroma formation and agrees to proceed    Subjective:      Patient ID: Sandra Naranjo is a 45 y o  male  Patient presents for evaluation of a lump on his upper back  He has had the lump for 10+ years  The lump has increased in size  Denies pain  The following portions of the patient's history were reviewed and updated as appropriate:     He  has a past medical history of Hypothyroid and Pollen allergies  He  has a past surgical history that includes Appendectomy (1985); Knee arthroscopy (Right, 1990); Injection anesthetic agent to cervical plexus; ARTHROSCOPY KNEE (Right, 6/30/2016); and pr colonoscopy flx dx w/collj spec when pfrmd (N/A, 4/22/2019)  His family history includes Breast cancer in his mother; Cancer in his sister; Hashimoto's thyroiditis in his sister; Melanoma in his maternal aunt; No Known Problems in his father  He  reports that he has never smoked  He has never used smokeless tobacco  He reports current alcohol use of about 8 0 standard drinks of alcohol per week  He reports that he does not use drugs  Current Outpatient Medications   Medication Sig Dispense Refill    cetirizine-pseudoephedrine (ZyrTEC-D) 5-120 MG per tablet Take 1 tablet by mouth as needed for allergies   (Patient not taking: Reported on 9/13/2022)      naproxen (NAPROSYN) 500 mg tablet Take 1 tablet (500 mg total) by mouth 2 (two) times a day with meals for 10 days 20 tablet 0    Synthroid 100 MCG tablet take 1 tablet by mouth once daily 90 tablet 1    tadalafil (CIALIS) 10 MG tablet Take 1 tablet (10 mg total) by mouth daily as needed for erectile dysfunction (Patient not taking: Reported on 9/13/2022) 10 tablet 0     No current facility-administered medications for this visit  He is allergic to penicillins and amoxicillin-pot clavulanate       Review of Systems   All other systems reviewed and are negative  Objective:      /83   Pulse 77   Ht 5' 9" (1 753 m)   Wt 92 5 kg (204 lb)   BMI 30 13 kg/m²          Physical Exam  Constitutional:       General: He is not in acute distress  HENT:      Head: Atraumatic  Mouth/Throat:      Mouth: Mucous membranes are moist    Eyes:      Extraocular Movements: Extraocular movements intact  Cardiovascular:      Rate and Rhythm: Normal rate  Pulmonary:      Effort: Pulmonary effort is normal    Musculoskeletal:      Cervical back: Normal range of motion  Skin:     General: Skin is warm and dry  Comments: 7 x 5 cm soft tissue upper back subcutaneous mass  No skin changes  No signs of infection  Clinically appears to be a lipoma   Neurological:      Mental Status: He is alert         extremities: No edema

## 2022-09-22 ENCOUNTER — ANESTHESIA EVENT (OUTPATIENT)
Dept: PERIOP | Facility: AMBULARY SURGERY CENTER | Age: 52
End: 2022-09-22
Payer: COMMERCIAL

## 2022-10-03 RX ORDER — CEFAZOLIN SODIUM 2 G/50ML
2000 SOLUTION INTRAVENOUS ONCE
Status: COMPLETED | OUTPATIENT
Start: 2022-10-06 | End: 2022-10-06

## 2022-10-05 ENCOUNTER — OFFICE VISIT (OUTPATIENT)
Dept: LAB | Age: 52
End: 2022-10-05
Payer: COMMERCIAL

## 2022-10-05 DIAGNOSIS — R22.2 MASS ON BACK: ICD-10-CM

## 2022-10-05 PROCEDURE — 93005 ELECTROCARDIOGRAM TRACING: CPT

## 2022-10-05 RX ORDER — FLUTICASONE PROPIONATE 50 MCG
1 SPRAY, SUSPENSION (ML) NASAL AS NEEDED
COMMUNITY

## 2022-10-05 NOTE — PRE-PROCEDURE INSTRUCTIONS
Pre-Surgery Instructions:   Medication Instructions    fluticasone (FLONASE) 50 mcg/act nasal spray Uses PRN- OK to take day of surgery    Synthroid 100 MCG tablet Take day of surgery  Have you had / have a sore throat? No  Have you had / have a cough less than 1 week? No  Have you had / have a fever greater than 100 0 - 100  4? No  Are you experiencing any shortness of breath? No    Review with patient via phone medications and showering instructions  Advised don't take NSAID's, ok tylenol products  Advised ASC call with surgery schedule time, nothing eat or drink after midnight  Verbalized understanding

## 2022-10-06 ENCOUNTER — ANESTHESIA (OUTPATIENT)
Dept: PERIOP | Facility: AMBULARY SURGERY CENTER | Age: 52
End: 2022-10-06
Payer: COMMERCIAL

## 2022-10-06 ENCOUNTER — HOSPITAL ENCOUNTER (OUTPATIENT)
Facility: AMBULARY SURGERY CENTER | Age: 52
Setting detail: OUTPATIENT SURGERY
Discharge: HOME | End: 2022-10-06
Attending: SURGERY | Admitting: SURGERY
Payer: COMMERCIAL

## 2022-10-06 VITALS
OXYGEN SATURATION: 98 % | WEIGHT: 198 LBS | BODY MASS INDEX: 29.24 KG/M2 | TEMPERATURE: 97.2 F | DIASTOLIC BLOOD PRESSURE: 88 MMHG | RESPIRATION RATE: 16 BRPM | HEART RATE: 63 BPM | SYSTOLIC BLOOD PRESSURE: 125 MMHG

## 2022-10-06 DIAGNOSIS — R22.2 MASS ON BACK: ICD-10-CM

## 2022-10-06 DIAGNOSIS — D17.1 LIPOMA OF TORSO: Primary | ICD-10-CM

## 2022-10-06 LAB
ATRIAL RATE: 62 BPM
ATRIAL RATE: 76 BPM
P AXIS: 58 DEGREES
P AXIS: 68 DEGREES
PR INTERVAL: 156 MS
PR INTERVAL: 158 MS
QRS AXIS: -20 DEGREES
QRS AXIS: -26 DEGREES
QRSD INTERVAL: 88 MS
QRSD INTERVAL: 94 MS
QT INTERVAL: 382 MS
QT INTERVAL: 388 MS
QTC INTERVAL: 393 MS
QTC INTERVAL: 429 MS
T WAVE AXIS: 25 DEGREES
T WAVE AXIS: 37 DEGREES
VENTRICULAR RATE: 62 BPM
VENTRICULAR RATE: 76 BPM

## 2022-10-06 PROCEDURE — 21932 EXC BACK TUM DEEP < 5 CM: CPT | Performed by: SURGERY

## 2022-10-06 PROCEDURE — 93010 ELECTROCARDIOGRAM REPORT: CPT | Performed by: INTERNAL MEDICINE

## 2022-10-06 PROCEDURE — 88304 TISSUE EXAM BY PATHOLOGIST: CPT | Performed by: PATHOLOGY

## 2022-10-06 RX ORDER — PROPOFOL 10 MG/ML
INJECTION, EMULSION INTRAVENOUS AS NEEDED
Status: DISCONTINUED | OUTPATIENT
Start: 2022-10-06 | End: 2022-10-06

## 2022-10-06 RX ORDER — MORPHINE SULFATE 4 MG/ML
4 INJECTION, SOLUTION INTRAMUSCULAR; INTRAVENOUS
Status: DISCONTINUED | OUTPATIENT
Start: 2022-10-06 | End: 2022-10-06 | Stop reason: HOSPADM

## 2022-10-06 RX ORDER — FENTANYL CITRATE 50 UG/ML
INJECTION, SOLUTION INTRAMUSCULAR; INTRAVENOUS AS NEEDED
Status: DISCONTINUED | OUTPATIENT
Start: 2022-10-06 | End: 2022-10-06

## 2022-10-06 RX ORDER — FENTANYL CITRATE/PF 50 MCG/ML
25 SYRINGE (ML) INJECTION
Status: DISCONTINUED | OUTPATIENT
Start: 2022-10-06 | End: 2022-10-06 | Stop reason: HOSPADM

## 2022-10-06 RX ORDER — ONDANSETRON 2 MG/ML
4 INJECTION INTRAMUSCULAR; INTRAVENOUS ONCE AS NEEDED
Status: DISCONTINUED | OUTPATIENT
Start: 2022-10-06 | End: 2022-10-06 | Stop reason: HOSPADM

## 2022-10-06 RX ORDER — OXYCODONE HYDROCHLORIDE 5 MG/1
5 TABLET ORAL EVERY 4 HOURS PRN
Qty: 10 TABLET | Refills: 0 | Status: SHIPPED | OUTPATIENT
Start: 2022-10-06 | End: 2022-10-16

## 2022-10-06 RX ORDER — SODIUM CHLORIDE, SODIUM LACTATE, POTASSIUM CHLORIDE, CALCIUM CHLORIDE 600; 310; 30; 20 MG/100ML; MG/100ML; MG/100ML; MG/100ML
125 INJECTION, SOLUTION INTRAVENOUS CONTINUOUS
Status: DISCONTINUED | OUTPATIENT
Start: 2022-10-06 | End: 2022-10-06 | Stop reason: HOSPADM

## 2022-10-06 RX ORDER — LIDOCAINE HYDROCHLORIDE 10 MG/ML
INJECTION, SOLUTION EPIDURAL; INFILTRATION; INTRACAUDAL; PERINEURAL AS NEEDED
Status: DISCONTINUED | OUTPATIENT
Start: 2022-10-06 | End: 2022-10-06

## 2022-10-06 RX ORDER — MAGNESIUM HYDROXIDE 1200 MG/15ML
LIQUID ORAL AS NEEDED
Status: DISCONTINUED | OUTPATIENT
Start: 2022-10-06 | End: 2022-10-06 | Stop reason: HOSPADM

## 2022-10-06 RX ORDER — MIDAZOLAM HYDROCHLORIDE 2 MG/2ML
INJECTION, SOLUTION INTRAMUSCULAR; INTRAVENOUS AS NEEDED
Status: DISCONTINUED | OUTPATIENT
Start: 2022-10-06 | End: 2022-10-06

## 2022-10-06 RX ORDER — BUPIVACAINE HYDROCHLORIDE 2.5 MG/ML
INJECTION, SOLUTION EPIDURAL; INFILTRATION; INTRACAUDAL AS NEEDED
Status: DISCONTINUED | OUTPATIENT
Start: 2022-10-06 | End: 2022-10-06 | Stop reason: HOSPADM

## 2022-10-06 RX ORDER — ONDANSETRON 2 MG/ML
INJECTION INTRAMUSCULAR; INTRAVENOUS AS NEEDED
Status: DISCONTINUED | OUTPATIENT
Start: 2022-10-06 | End: 2022-10-06

## 2022-10-06 RX ORDER — ONDANSETRON 2 MG/ML
4 INJECTION INTRAMUSCULAR; INTRAVENOUS EVERY 4 HOURS PRN
Status: DISCONTINUED | OUTPATIENT
Start: 2022-10-06 | End: 2022-10-06 | Stop reason: HOSPADM

## 2022-10-06 RX ORDER — DEXAMETHASONE SODIUM PHOSPHATE 10 MG/ML
INJECTION, SOLUTION INTRAMUSCULAR; INTRAVENOUS AS NEEDED
Status: DISCONTINUED | OUTPATIENT
Start: 2022-10-06 | End: 2022-10-06

## 2022-10-06 RX ORDER — KETOROLAC TROMETHAMINE 30 MG/ML
INJECTION, SOLUTION INTRAMUSCULAR; INTRAVENOUS AS NEEDED
Status: DISCONTINUED | OUTPATIENT
Start: 2022-10-06 | End: 2022-10-06

## 2022-10-06 RX ORDER — OXYCODONE HYDROCHLORIDE 5 MG/1
5 TABLET ORAL EVERY 4 HOURS PRN
Status: DISCONTINUED | OUTPATIENT
Start: 2022-10-06 | End: 2022-10-06 | Stop reason: HOSPADM

## 2022-10-06 RX ORDER — SODIUM CHLORIDE, SODIUM LACTATE, POTASSIUM CHLORIDE, CALCIUM CHLORIDE 600; 310; 30; 20 MG/100ML; MG/100ML; MG/100ML; MG/100ML
INJECTION, SOLUTION INTRAVENOUS CONTINUOUS PRN
Status: DISCONTINUED | OUTPATIENT
Start: 2022-10-06 | End: 2022-10-06

## 2022-10-06 RX ADMIN — PROPOFOL 200 MG: 10 INJECTION, EMULSION INTRAVENOUS at 10:07

## 2022-10-06 RX ADMIN — CEFAZOLIN SODIUM 2000 MG: 2 SOLUTION INTRAVENOUS at 10:03

## 2022-10-06 RX ADMIN — MIDAZOLAM HYDROCHLORIDE 2 MG: 1 INJECTION, SOLUTION INTRAMUSCULAR; INTRAVENOUS at 10:03

## 2022-10-06 RX ADMIN — KETOROLAC TROMETHAMINE 30 MG: 30 INJECTION, SOLUTION INTRAMUSCULAR at 10:41

## 2022-10-06 RX ADMIN — ONDANSETRON 4 MG: 2 INJECTION INTRAMUSCULAR; INTRAVENOUS at 10:14

## 2022-10-06 RX ADMIN — FENTANYL CITRATE 50 MCG: 50 INJECTION, SOLUTION INTRAMUSCULAR; INTRAVENOUS at 10:15

## 2022-10-06 RX ADMIN — DEXAMETHASONE SODIUM PHOSPHATE 8 MG: 10 INJECTION, SOLUTION INTRAMUSCULAR; INTRAVENOUS at 10:14

## 2022-10-06 RX ADMIN — FENTANYL CITRATE 50 MCG: 50 INJECTION, SOLUTION INTRAMUSCULAR; INTRAVENOUS at 10:26

## 2022-10-06 RX ADMIN — LIDOCAINE HYDROCHLORIDE 50 MG: 10 INJECTION, SOLUTION EPIDURAL; INFILTRATION; INTRACAUDAL at 10:07

## 2022-10-06 RX ADMIN — SODIUM CHLORIDE, SODIUM LACTATE, POTASSIUM CHLORIDE, AND CALCIUM CHLORIDE: .6; .31; .03; .02 INJECTION, SOLUTION INTRAVENOUS at 10:03

## 2022-10-06 NOTE — ANESTHESIA PREPROCEDURE EVALUATION
Procedure:  EXCISION  BIOPSY LESION/MASS BACK (N/A Back)    Relevant Problems   ENDO   (+) Primary hypothyroidism      MUSCULOSKELETAL   (+) Localized osteoarthritis of right knee      NEURO/PSYCH   (+) History of colon polyps        Physical Exam    Airway    Mallampati score: II  TM Distance: >3 FB  Neck ROM: full     Dental       Cardiovascular      Pulmonary      Other Findings        Anesthesia Plan  ASA Score- 2     Anesthesia Type- general with ASA Monitors  Additional Monitors:   Airway Plan: LMA  Comment: Light sedation pending positioning for excision  Possible GA w/ LMA vs ETT  Plan Factors-Exercise tolerance (METS): >4 METS  Chart reviewed  Patient is not a current smoker  Patient did not smoke on day of surgery  Induction- intravenous  Postoperative Plan-     Informed Consent- Anesthetic plan and risks discussed with patient  I personally reviewed this patient with the CRNA  Discussed and agreed on the Anesthesia Plan with the CRNA         NPO appropriate  Discussed benefits/risks of monitored anesthetic care and discussed providing a dynamic level of mild to deep sedation  Risks include awareness, airway obstruction, aspiration which may necessitate conversion to general anesthesia  All questions answered  Patient understands and wishes to proceed  Anesthesia plan and consent discussed with Morteza Mittal who expressed understanding and agreement  Risks/benefits and alternatives discussed with patient including possible PONV, sore throat, damage to teeth/lips/gums and possibility of rare anesthetic and surgical emergencies 
[id number]

## 2022-10-06 NOTE — DISCHARGE INSTRUCTIONS
Apply ice as needed to wound    Tylenol or ibuprofen as needed for pain    Prescription for oxycodone provided your pharmacy    May shower    Call [phone] with questions or concerns

## 2022-10-06 NOTE — INTERVAL H&P NOTE
H&P reviewed  After examining the patient I find no changes in the patients condition since the H&P had been written      Vitals:    10/06/22 0927   BP: 129/78   Pulse: 68   Resp: 18   Temp: (!) 96 9 °F (36 1 °C)   SpO2: 99%

## 2022-10-06 NOTE — ANESTHESIA POSTPROCEDURE EVALUATION
Post-Op Assessment Note    CV Status:  Stable  Pain Score: 0    Pain management: adequate     Mental Status:  Arousable and sleepy   Hydration Status:  Stable and euvolemic   PONV Controlled:  Controlled   Airway Patency:  Patent and adequate      Post Op Vitals Reviewed: Yes      Staff: CRNA         No complications documented      /57 (10/06/22 1048)    Temp (!) 97 4 °F (36 3 °C) (10/06/22 1048)    Pulse 62 (10/06/22 1048)   Resp 13 (10/06/22 1048)    SpO2 99 % (10/06/22 1048)

## 2022-10-06 NOTE — OP NOTE
OPERATIVE REPORT  PATIENT NAME: Jaylyn Najera    :  1970  MRN: [id number]  Pt [facility]: [facility] 01    SURGERY DATE: 10/6/2022    Surgeon(s) and Role:     * Richard Ashok Landau Conron, DO - Primary    Preop Diagnosis:  Mass on back [R22 2]    Post-Op Diagnosis Codes:     * Mass on back [R22 2]    Procedure(s) (LRB):  EXCISION  BIOPSY LESION/MASS BACK (N/A)    Specimen(s):  ID Type Source Tests Collected by Time Destination   1 : BACK MASS/LIPOMA Tissue Soft Tissue, Lipoma TISSUE EXAM Moshe Amador DO 10/6/2022 1026        Estimated Blood Loss:   Minimal    Drains:  * No LDAs found *    Anesthesia Type:   IV Sedation with Anesthesia    Operative Indications: Mass on back [R22 2]      Operative Findings:  Intramuscular lipomatous mass  ASA 2  Wound class 1  Height 69 in weight 90 kg/198 lb  BMI 29    Complications:   None    Procedure and Technique:  Patient was brought the operative suite and identified by visualization, conversation, by armband  Sequential compression pumps were placed  He was given Ancef perioperatively  Once under anesthesia he was then rolled in his left shoulder down to help expose the right upper back mass  This areas prepped and draped in a sterile fashion  Time-out was performed assured that the prep was dry  Local was instilled over the palpable mass  Horizontal skin incision was made  Subcutaneous tissue was divided hot cautery  The mass was noted a deep to the subcutaneous tissue in the underlying musculature  This was carefully dissected out from the depths of the underlying musculature  Hemostasis was assured  It was removed with no specific margin  Ultimately was approximately 4 x 5 cm in size  Once removed irrigation was carried out  Reapproximated the musculature with 3-0 Vicryl  3-0 Vicryl was used to reapproximate subcutaneous tissues  Four Monocryl was used to close skin in a subcuticular fashion  Wounds washed and dried    Sterile skin glue was applied  He was awakened in the operating returned to the recovery area in stable condition having tolerated the procedure well     I was present for the entire procedure    Patient Disposition:  PACU         SIGNATURE: Brigid Solomon DO  DATE: October 6, 2022  TIME: 10:43 AM

## 2022-10-11 ENCOUNTER — TELEPHONE (OUTPATIENT)
Dept: OTHER | Facility: OTHER | Age: 52
End: 2022-10-11

## 2022-10-11 DIAGNOSIS — E03.9 PRIMARY HYPOTHYROIDISM: ICD-10-CM

## 2022-10-11 RX ORDER — LEVOTHYROXINE SODIUM 100 MCG
TABLET ORAL
Qty: 90 TABLET | Refills: 0 | Status: SHIPPED | OUTPATIENT
Start: 2022-10-11

## 2022-10-11 NOTE — TELEPHONE ENCOUNTER
Patient thought his Colonoscopy was tomorrow and called regarding the Magnesium Citrate recall  He was advised to take (4) 5 mg Dulcolax tablets instead  2 between 2 pm and 5 pm the day before the procedure and 2 at 8 pm the day before the procedure  He was also informed his procedure is scheduled for 12/12/22  Patient has been on a clear liquid diet all day and would like a call back if it is possible to have his procedure moved up

## 2022-10-19 PROCEDURE — 88304 TISSUE EXAM BY PATHOLOGIST: CPT | Performed by: PATHOLOGY

## 2022-12-12 ENCOUNTER — HOSPITAL ENCOUNTER (OUTPATIENT)
Dept: GASTROENTEROLOGY | Facility: AMBULARY SURGERY CENTER | Age: 52
Setting detail: OUTPATIENT SURGERY
Discharge: HOME | End: 2022-12-12
Attending: INTERNAL MEDICINE

## 2022-12-12 ENCOUNTER — ANESTHESIA EVENT (OUTPATIENT)
Dept: GASTROENTEROLOGY | Facility: AMBULARY SURGERY CENTER | Age: 52
End: 2022-12-12

## 2022-12-12 ENCOUNTER — ANESTHESIA (OUTPATIENT)
Dept: GASTROENTEROLOGY | Facility: AMBULARY SURGERY CENTER | Age: 52
End: 2022-12-12

## 2022-12-12 VITALS
TEMPERATURE: 98.2 F | HEART RATE: 64 BPM | RESPIRATION RATE: 16 BRPM | DIASTOLIC BLOOD PRESSURE: 76 MMHG | OXYGEN SATURATION: 97 % | SYSTOLIC BLOOD PRESSURE: 150 MMHG

## 2022-12-12 DIAGNOSIS — Z86.010 HX OF COLONIC POLYPS: ICD-10-CM

## 2022-12-12 RX ORDER — LIDOCAINE HYDROCHLORIDE 10 MG/ML
INJECTION, SOLUTION EPIDURAL; INFILTRATION; INTRACAUDAL; PERINEURAL AS NEEDED
Status: DISCONTINUED | OUTPATIENT
Start: 2022-12-12 | End: 2022-12-12

## 2022-12-12 RX ORDER — LIDOCAINE HYDROCHLORIDE 10 MG/ML
0.5 INJECTION, SOLUTION EPIDURAL; INFILTRATION; INTRACAUDAL; PERINEURAL ONCE AS NEEDED
Status: DISCONTINUED | OUTPATIENT
Start: 2022-12-12 | End: 2022-12-16 | Stop reason: HOSPADM

## 2022-12-12 RX ORDER — SODIUM CHLORIDE, SODIUM LACTATE, POTASSIUM CHLORIDE, CALCIUM CHLORIDE 600; 310; 30; 20 MG/100ML; MG/100ML; MG/100ML; MG/100ML
INJECTION, SOLUTION INTRAVENOUS CONTINUOUS PRN
Status: DISCONTINUED | OUTPATIENT
Start: 2022-12-12 | End: 2022-12-12

## 2022-12-12 RX ORDER — PROPOFOL 10 MG/ML
INJECTION, EMULSION INTRAVENOUS AS NEEDED
Status: DISCONTINUED | OUTPATIENT
Start: 2022-12-12 | End: 2022-12-12

## 2022-12-12 RX ORDER — SIMETHICONE 20 MG/.3ML
EMULSION ORAL AS NEEDED
Status: COMPLETED | OUTPATIENT
Start: 2022-12-12 | End: 2022-12-12

## 2022-12-12 RX ORDER — SODIUM CHLORIDE, SODIUM LACTATE, POTASSIUM CHLORIDE, CALCIUM CHLORIDE 600; 310; 30; 20 MG/100ML; MG/100ML; MG/100ML; MG/100ML
125 INJECTION, SOLUTION INTRAVENOUS CONTINUOUS
Status: DISCONTINUED | OUTPATIENT
Start: 2022-12-12 | End: 2022-12-16 | Stop reason: HOSPADM

## 2022-12-12 RX ADMIN — PROPOFOL 150 MG: 10 INJECTION, EMULSION INTRAVENOUS at 12:06

## 2022-12-12 RX ADMIN — PROPOFOL 50 MG: 10 INJECTION, EMULSION INTRAVENOUS at 12:09

## 2022-12-12 RX ADMIN — PROPOFOL 100 MG: 10 INJECTION, EMULSION INTRAVENOUS at 12:07

## 2022-12-12 RX ADMIN — LIDOCAINE HYDROCHLORIDE 50 MG: 10 INJECTION, SOLUTION EPIDURAL; INFILTRATION; INTRACAUDAL at 12:06

## 2022-12-12 RX ADMIN — PROPOFOL 50 MG: 10 INJECTION, EMULSION INTRAVENOUS at 12:12

## 2022-12-12 RX ADMIN — Medication 40 MG: at 12:08

## 2022-12-12 RX ADMIN — SODIUM CHLORIDE, SODIUM LACTATE, POTASSIUM CHLORIDE, AND CALCIUM CHLORIDE: .6; .31; .03; .02 INJECTION, SOLUTION INTRAVENOUS at 11:56

## 2022-12-12 NOTE — ANESTHESIA POSTPROCEDURE EVALUATION
Post-Op Assessment Note    CV Status:  Stable  Pain Score: 0    Pain management: adequate     Mental Status:  Awake and alert   Hydration Status:  Stable   PONV Controlled:  None   Airway Patency:  Patent and adequate      Post Op Vitals Reviewed: Yes      Staff: CRNA, Anesthesiologist         No notable events documented      BP   142/63   Temp      Pulse 78   Resp   16   SpO2   100%

## 2022-12-12 NOTE — H&P
History and Physical - [facility]  Nini Rangel 45 y o  male MRN: [id number]        HPI: 78-year-old male with history of colon polyps was referred for colonoscopy  Regular bowel movements  Historical Information   Past Medical History:   Diagnosis Date   • Colon polyp    • Disease of thyroid gland    • Hypothyroid    • Pollen allergies    • PTSD (post-traumatic stress disorder)    • Sleep apnea      Past Surgical History:   Procedure Laterality Date   • APPENDECTOMY  1985   • ARTHROSCOPY KNEE Right 6/30/2016    Procedure: ARTHROSCOPY KNEE, MEDIAL MENISCECTOMY;  Surgeon: Sunday Howard MD;  Location: [place] [id number] MAIN OR;  Service:    • INJECTION ANESTHETIC AGENT TO CERVICAL PLEXUS     • KNEE ARTHROSCOPY Right 1990   • AL COLONOSCOPY FLX DX W/COLLJ SPEC WHEN PFRMD N/A 4/22/2019    Procedure: COLONOSCOPY;  Surgeon: Annika Nance MD;  [facility]: [facility];   Service: Gastroenterology   • AL EXCISION TUMOR SOFT TIS BACK/FLANK SUBQ 3 CM/> N/A 10/6/2022    Procedure: EXCISION  BIOPSY LESION/MASS BACK;  Surgeon: Mathew Amador DO;  Location: [facility] MAIN OR;  Service: General     Social History   Social History     Substance and Sexual Activity   Alcohol Use Yes   • Alcohol/week: 8 0 standard drinks   • Types: 8 Cans of beer per week    Comment: 1 per week     Social History     Substance and Sexual Activity   Drug Use Yes   • Frequency: 4 0 times per week   • Types: Marijuana     Social History     Tobacco Use   Smoking Status Never   Smokeless Tobacco Never     Family History   Problem Relation Age of Onset   • Breast cancer Mother    • Cancer Sister         3 sisters thyroid CA   • No Known Problems Father    • Hashimoto's thyroiditis Sister    • Melanoma Maternal Aunt        Meds/Allergies     (Not in a hospital admission)      Allergies   Allergen Reactions   • Penicillins    • Amoxicillin-Pot Clavulanate Rash       Objective     Blood pressure 140/69, pulse (!) 54, temperature 97 8 °F (36 6 °C), temperature source Temporal, resp  rate 18, SpO2 97 %      PHYSICAL EXAM:    Gen: NAD  CV: S1 & S2 normal, RRR  CHEST: Clear to auscultate  ABD: soft, NT/ND, good bowel sounds  EXT: no edema    ASSESSMENT:     History of colon polyps    PLAN:    Colonoscopy

## 2022-12-12 NOTE — ANESTHESIA PREPROCEDURE EVALUATION
Procedure:  COLONOSCOPY    Relevant Problems   ANESTHESIA (within normal limits)   (-) History of anesthesia complications      CARDIO   (-) Chest pain   (-) MAGAÑA (dyspnea on exertion)      ENDO   (+) Primary hypothyroidism      PULMONARY   (-) Shortness of breath   (-) URI (upper respiratory infection)        Physical Exam    Airway    Mallampati score: II  TM Distance: >3 FB  Neck ROM: full     Dental       Cardiovascular      Pulmonary      Other Findings        Anesthesia Plan  ASA Score- 2     Anesthesia Type- IV sedation with anesthesia with ASA Monitors  Additional Monitors:   Airway Plan:           Plan Factors-Exercise tolerance (METS): >4 METS  Chart reviewed  EKG reviewed  Existing labs reviewed  Patient summary reviewed  Induction- intravenous  Postoperative Plan-     Informed Consent- Anesthetic plan and risks discussed with patient  I personally reviewed this patient with the CRNA  Discussed and agreed on the Anesthesia Plan with the CRNA  Destiny Knutson

## 2023-01-01 NOTE — PROGRESS NOTES
Large joint arthrocentesis  Date/Time: 9/19/2018 5:28 PM  Consent given by: patient  Site marked: site marked  Timeout: Immediately prior to procedure a time out was called to verify the correct patient, procedure, equipment, support staff and site/side marked as required   Supporting Documentation  Indications: pain   Procedure Details  Location: knee - R knee  Preparation: Patient was prepped and draped in the usual sterile fashion  Needle size: 22 G  Ultrasound guidance: no  Approach: anterolateral  Medications administered: 20 mg Sodium Hyaluronate 20 MG/2ML    Patient tolerance: patient tolerated the procedure well with no immediate complications  Dressing:  Sterile dressing applied Recommendation sent via e-advice.

## 2023-01-06 DIAGNOSIS — E03.9 PRIMARY HYPOTHYROIDISM: ICD-10-CM

## 2023-01-06 RX ORDER — LEVOTHYROXINE SODIUM 100 MCG
TABLET ORAL
Qty: 90 TABLET | Refills: 0 | Status: SHIPPED | OUTPATIENT
Start: 2023-01-06

## 2023-03-31 ENCOUNTER — APPOINTMENT (OUTPATIENT)
Dept: RADIOLOGY | Age: 53
End: 2023-03-31

## 2023-03-31 ENCOUNTER — OFFICE VISIT (OUTPATIENT)
Dept: OBGYN CLINIC | Facility: CLINIC | Age: 53
End: 2023-03-31

## 2023-03-31 VITALS — WEIGHT: 193 LBS | HEIGHT: 69 IN | BODY MASS INDEX: 28.58 KG/M2

## 2023-03-31 DIAGNOSIS — M25.561 ACUTE PAIN OF RIGHT KNEE: ICD-10-CM

## 2023-03-31 DIAGNOSIS — M25.561 ACUTE PAIN OF RIGHT KNEE: Primary | ICD-10-CM

## 2023-03-31 DIAGNOSIS — M17.11 ARTHRITIS OF RIGHT KNEE: ICD-10-CM

## 2023-03-31 NOTE — PROGRESS NOTES
Ortho Sports Medicine New Patient Visit     Assesment:   45 y o  male with right knee arthritis    Plan: We had a long discussion regarding right knee degenerative joint disease including options for treatment  I recommended starting with conservative management options  I recommended physical therapy as I do believe this will help with strength and mobility, and may improved symptoms significantly, however Sharita Hanks stated he would prefer not to attend PT  We discussed options for injections including corticosteroids, viscosupplementation, and biologics including risks and benefits of each  An order was placed for a right  medial un- brace  Patient stated he will wear the brace and continue his daily walking and performing his activities of daily living  He will contact the office as needed if symptoms worsen or fail to improve  Chief Complaint   Patient presents with   • Right Knee - Pain       History of Present Illness: The patient is a 45 y o  male with right knee pain  He has history of right medial meniscectomy in 2016 and has had several Euflexxa injections in the past  Patient reports he is beginning to feel pain in the knee when he is walking  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 MAIN OR;  Service:    • INJECTION ANESTHETIC AGENT TO CERVICAL PLEXUS     • KNEE ARTHROSCOPY Right 1990   • SC COLONOSCOPY FLX DX W/COLLJ SPEC WHEN PFRMD N/A 4/22/2019 Procedure: COLONOSCOPY;  Surgeon: Velma Kehr, MD;  [facility]: [facility]; Service: Gastroenterology   • IL EXCISION TUMOR SOFT TIS BACK/FLANK SUBQ 3 CM/> N/A 10/6/2022    Procedure: EXCISION  BIOPSY LESION/MASS BACK;  Surgeon: Blanche Amador DO;  [facility];  Service: General     Allergies   Allergen Reactions   • Penicillins    • Amoxicillin-Pot Clavulanate Rash     Current Outpatient Medications on File Prior to Visit   Medication Sig Dispense Refill   • fluticasone (FLONASE) 50 mcg/act nasal spray 1 spray into each nostril as needed for rhinitis     • Synthroid 100 MCG tablet take 1 tablet by mouth once daily 90 tablet 0     No current facility-administered medications on file prior to visit  Social History     Socioeconomic History   • Marital status: /Civil Union     Spouse name: Not on file   • Number of children: Not on file   • Years of education: Not on file   • Highest education level: Not on file   Occupational History   • Not on file   Tobacco Use   • Smoking status: Never   • Smokeless tobacco: Never   Vaping Use   • Vaping Use: Never used   Substance and Sexual Activity   • Alcohol use: Yes     Alcohol/week: 8 0 standard drinks     Types: 8 Cans of beer per week     Comment: 1 per week   • Drug use: Yes     Frequency: 4 0 times per week     Types: Marijuana   • Sexual activity: Not on file   Other Topics Concern   • Not on file   Social History Narrative   • Not on file     Social Determinants of Health     Financial Resource Strain: Not on file   Food Insecurity: Not on file   Transportation Needs: Not on file   Physical Activity: Not on file   Stress: Not on file   Social Connections: Not on file   Intimate Partner Violence: Not on file   Housing Stability: Not on file     I have reviewed the past medical, surgical, social and family history, medications and allergies as documented in the EMR      Review of systems: ROS is negative other than that noted in the "HPI   Constitutional: Negative for fatigue and fever  HENT: Negative for sore throat  Respiratory: Negative for shortness of breath  Cardiovascular: Negative for chest pain  Gastrointestinal: Negative for abdominal pain  Endocrine: Negative for cold intolerance and heat intolerance  Genitourinary: Negative for flank pain  Musculoskeletal: Negative for back pain  Skin: Negative for rash  Allergic/Immunologic: Negative for immunocompromised state  Neurological: Negative for dizziness  Psychiatric/Behavioral: Negative for agitation  Physical Exam:    Height 5' 8 5\" (1 74 m), weight 87 5 kg (193 lb)  General/Constitutional: NAD, well developed, well nourished  HENT: Normocephalic, atraumatic  CV: Intact distal pulses, regular rate  Resp: No respiratory distress or labored breathing  Lymphatic: No lymphadenopathy palpated  Neuro: Alert and Oriented x 3, no focal deficits  Psych: Normal mood, normal affect  Skin: Warm, dry, no rashes, no erythema    Knee Exam:   No effusion no skin lesions  Full extension   Varus alignment  No effusion  Mild joint line tenderness  Medial joint line tenderness  Stable in all directions    Knee Imaging    X-rays of the knee reviewed and interpreted today  These show no acute fractures  Advanced medial compartment degenerative changes  patella is well centered on the trochlea      "

## 2023-04-20 PROBLEM — Z00.00 ENCOUNTER FOR PREVENTIVE HEALTH EXAMINATION: Status: ACTIVE | Noted: 2023-04-20

## 2023-04-23 ENCOUNTER — NON-APPOINTMENT (OUTPATIENT)
Age: 53
End: 2023-04-23

## 2023-04-24 ENCOUNTER — RESULT REVIEW (OUTPATIENT)
Age: 53
End: 2023-04-24

## 2023-04-24 ENCOUNTER — OUTPATIENT (OUTPATIENT)
Dept: OUTPATIENT SERVICES | Facility: HOSPITAL | Age: 53
LOS: 1 days | End: 2023-04-24
Payer: COMMERCIAL

## 2023-04-24 ENCOUNTER — APPOINTMENT (OUTPATIENT)
Dept: ORTHOPEDIC SURGERY | Facility: CLINIC | Age: 53
End: 2023-04-24
Payer: MEDICARE

## 2023-04-24 ENCOUNTER — APPOINTMENT (OUTPATIENT)
Dept: RADIOLOGY | Facility: CLINIC | Age: 53
End: 2023-04-24

## 2023-04-24 VITALS
TEMPERATURE: 97.6 F | BODY MASS INDEX: 28.58 KG/M2 | HEART RATE: 69 BPM | HEIGHT: 69 IN | SYSTOLIC BLOOD PRESSURE: 148 MMHG | OXYGEN SATURATION: 98 % | WEIGHT: 193 LBS | DIASTOLIC BLOOD PRESSURE: 71 MMHG | RESPIRATION RATE: 18 BRPM

## 2023-04-24 DIAGNOSIS — Z86.39 PERSONAL HISTORY OF OTHER ENDOCRINE, NUTRITIONAL AND METABOLIC DISEASE: ICD-10-CM

## 2023-04-24 DIAGNOSIS — Z78.9 OTHER SPECIFIED HEALTH STATUS: ICD-10-CM

## 2023-04-24 DIAGNOSIS — Z80.3 FAMILY HISTORY OF MALIGNANT NEOPLASM OF BREAST: ICD-10-CM

## 2023-04-24 PROCEDURE — 77073 BONE LENGTH STUDIES: CPT | Mod: 26

## 2023-04-24 PROCEDURE — 99205 OFFICE O/P NEW HI 60 MIN: CPT

## 2023-04-24 PROCEDURE — 73564 X-RAY EXAM KNEE 4 OR MORE: CPT | Mod: 26,50

## 2023-04-24 PROCEDURE — 73564 X-RAY EXAM KNEE 4 OR MORE: CPT

## 2023-04-24 PROCEDURE — 77073 BONE LENGTH STUDIES: CPT

## 2023-04-24 NOTE — PHYSICAL EXAM
[de-identified] : Right  Knee/Lower extremity Exam: \par \par Skin: Normal. No erythema, no ecchymosis, no abrasions, no scratches.  \par                 \par Old Incisions: Healed arthroscopy portals. \par \par Knee Joint Swelling: None. Positive effusion. Mild \par \par Popliteal Swelling: None	\par \par Pre-Patella Bursa Swelling: None. \par \par Alignment: 		        \par Varus, Mild Moderate\par Passively correctable to near neutral.  \par \par Knee Joint Line Tenderness: \par Tender at medial joint line. \par Not tender at the lateral joint line.   \par Not tender in the patellofemoral compartment. \par \par Soft Tissue Tenderness: None. \par \par Medial Collateral Ligament Laxity:  Good endpoint.                                                       \par Medial opening to valgus stress.   Moderate.    \par \par Lateral Collateral Ligament Laxity:          \par Normal laxity. Good endpoint. \par \par ROM Extension: 0 degrees.   \par ROM Flexion: 125+ degrees.   \par \par ACL Testing: 			\par Stable ACL. Good Endpoint.                                                                \par Lachman Test: Negative \par Anterior Drawer Test: Negative \par \par PCL Testing: 			\par Stable PCL.  Good Endpoint.                                                               \par Posterior Drawer Test: Negative \par \par Patella Compression Test: Negative \par \par Patellofemoral Crepitus: None.   \par \par Patellofemoral Apprehension Testing: Negative. \par \par Patellofemoral Laxity: Normal.\par \par Motor Strength: 			\par Quadriceps strength is 5 out of 5                                                                \par Hamstring strength is 5 out of 5                                                               \par Ankle dorsiflexion strength is 5 out of 5                                                              \par Ankle plantarflexion strength is 5 out of 5 \par \par Sensation: Light tough sensation in the lower extremity is grossly normal.  \par                                    \par Pulses: 				\par Pulses are palpable at the ankle at the Dorsalis Pedis Artery.                                                               \par Pulses are palpable at the Posterior Tibialis Artery.                                                               \par \par Osteophytes: There are no palpable osteophytes along the knee margins.                                                                \par \par Hip Motion: The patient has full and painless hip range of motion.                                                                            \par \par Lumbar Spine Symptoms: Negative straight leg raise.                                                              \par \par Gait: Limping Gait.  Abnormal gait.  \par \par Assistive Devices: 	None\par \par \par  [de-identified] : X-ray of the Right Knee:\par \par AP Standing View:\par Medial joint space loss. Moderate.\par Lateral joint space preserved.\par \par PA Flexion View:\par Medial joint space loss. Moderate \par Lateral joint space preserved.\par \par \par Lateral View: \par Patellofemoral joint space preserved.\par \par [place] View: \par Patellofemoral joint space is preserved.\par Patellofemoral joint central tracking.\par \par Bilateral Hip to Ankle Standing View:\par Alignment: Varus\par Medial joint space loss. Moderate.\par Hips: No significant changes\par

## 2023-04-24 NOTE — DISCUSSION/SUMMARY
[de-identified] : Assessment:\par \par Right Knee\par \par - Symptoms: Right knee pain medially with weightbearing. No lateral pain or patellofemoral pain.\par - Exam: Medial joint line tenderness. Laxity of MCL with good endpoint. \par - Exam: Intact ACL, PCL, LCL.\par - Exam: No patellofemoral or lateral joint line tenderness.\par - Exam: Range of Motion: 0-125+\par - X-rays: Medial compartment joint space loss in right knee. Moderate \par - Diagnosis: Medial compartment osteoarthritis, right knee. Moderate \par - Non surgical treatments fail to provide adequate relief of osteoarthritis knee symptoms.\par - Good to Excellent candidate for Medial Compartment Partial Knee Replacement, right knee.\par \par \par Plan: \par \par - The patient will consider a Partial Knee Replacement with Dr. Bipin Bills Jr. MD.\par - The side, Right. \par - The Compartment, Medial \par - The procedure will be Aristides Robotic-Arm Assisted.\par - The implants will be cemented.\par - The implants used are "Restoris MCK" implants from [employer].\par - The materials are Titanium tibial implants, CoCr Alloy femoral implants, Polyethylene tibial bearings.\par - The patient will discuss surgery scheduling with our coordinator\par - We provided our information packet to take home,review, and prepare for the preoperative visit.\par - The patient was given the opportunity to schedule their procedure today, or call us when they are ready.\par - We will assist the patient with all the preoperative scheduling and planning requirements.\par - Our next visit will be a preoperative visit just prior to surgery to review the procedure, medical clearance, and answer any questions the patient may have.\par \par I look forward to the opportunity to help this patient with their painful knee condition.\par \par \par Discussion of the risks and benefits of partial knee replacement:\par \par I had a discussion with the patient regarding the findings of their history, exam and imaging. We discussed the option of Partial Knee Replacement using the precision Aristides™ Robotic-Arm System. We discussed the indications, surgical process, my techniques of surgery, the probable post-operative course and instructions, and the risks and benefits of the procedure. The patient had their questions answered to their satisfaction. Although there are no guarantees to success, I feel that the surgery would be very beneficial and there is an excellent chance for a positive outcome.\par \par The risks of this procedure include but are not limited to the risks of anesthesia, heart attack, stroke, respiratory complications, wound healing problems, skin blisters, delayed wound healing, infection, bleeding, nerve damage, vessel damage, skin sensory changes next to the incisions, loss of motion, scar tissue formation requiring further treatment, blood clots, heterotopic bone formation, implant wear, implant loosening after surgery, allergic responses to the implant materials, continued pain despite proper implant placement, soft tissue pain, continued knee swelling, with partial knee replacement there can be progression of arthritis into the other compartments requiring future surgery, and the possible need for further surgery in the future for scar tissue removal, plastic insert change over time, addition of other compartment replacements, and possible revision to total knee replacement.\par \par \par \par

## 2023-04-24 NOTE — HISTORY OF PRESENT ILLNESS
[de-identified] : Pawan is a 52-year-old white male who is here today with complaint of medial sided right knee pain.  The pain has been going on for several years.  The pain intensity is moderate.  His knee symptoms include pain with activity pain with walking swelling of the knee swelling behind the knee loss of motion limping weakness giving way feeling of instability painful clicking and locking in position at times.  The pain is located on the inner side of the knee.  The pain is constant.  The pain is an aching type of pain.  Activities that hurt include walking walking after sitting, running, turning and twisting, squatting, lunges, rising from a seat, getting on and off toilets, going up and down stairs, getting in and out of cars.\stephen \stephen Christopher is tried anti-inflammatory medications and a regular exercise program with with knee focused exercises.  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

## 2023-05-03 DIAGNOSIS — Z01.812 ENCOUNTER FOR PREPROCEDURAL LABORATORY EXAMINATION: ICD-10-CM

## 2023-05-10 ENCOUNTER — APPOINTMENT (OUTPATIENT)
Dept: ORTHOPEDIC SURGERY | Facility: CLINIC | Age: 53
End: 2023-05-10
Payer: COMMERCIAL

## 2023-05-10 ENCOUNTER — APPOINTMENT (OUTPATIENT)
Dept: CT IMAGING | Facility: CLINIC | Age: 53
End: 2023-05-10

## 2023-05-10 ENCOUNTER — OUTPATIENT (OUTPATIENT)
Dept: OUTPATIENT SERVICES | Facility: HOSPITAL | Age: 53
LOS: 1 days | End: 2023-05-10

## 2023-05-10 VITALS
WEIGHT: 193 LBS | RESPIRATION RATE: 18 BRPM | HEIGHT: 69 IN | BODY MASS INDEX: 28.58 KG/M2 | OXYGEN SATURATION: 98 % | SYSTOLIC BLOOD PRESSURE: 144 MMHG | HEART RATE: 94 BPM | TEMPERATURE: 98.1 F | DIASTOLIC BLOOD PRESSURE: 75 MMHG

## 2023-05-10 DIAGNOSIS — G89.29 PAIN IN RIGHT KNEE: ICD-10-CM

## 2023-05-10 DIAGNOSIS — S82.101A UNSPECIFIED FRACTURE OF UPPER END OF RIGHT TIBIA, INITIAL ENCOUNTER FOR CLOSED FRACTURE: ICD-10-CM

## 2023-05-10 DIAGNOSIS — S86.811A STRAIN OF OTHER MUSCLE(S) AND TENDON(S) AT LOWER LEG LEVEL, RIGHT LEG, INITIAL ENCOUNTER: ICD-10-CM

## 2023-05-10 DIAGNOSIS — Z01.818 ENCOUNTER FOR OTHER PREPROCEDURAL EXAMINATION: ICD-10-CM

## 2023-05-10 DIAGNOSIS — M17.11 UNILATERAL PRIMARY OSTEOARTHRITIS, RIGHT KNEE: ICD-10-CM

## 2023-05-10 DIAGNOSIS — M25.561 PAIN IN RIGHT KNEE: ICD-10-CM

## 2023-05-10 PROCEDURE — 99214 OFFICE O/P EST MOD 30 MIN: CPT

## 2023-05-11 ENCOUNTER — OFFICE VISIT (OUTPATIENT)
Dept: FAMILY MEDICINE CLINIC | Facility: CLINIC | Age: 53
End: 2023-05-11

## 2023-05-11 VITALS
WEIGHT: 194 LBS | DIASTOLIC BLOOD PRESSURE: 80 MMHG | HEIGHT: 69 IN | OXYGEN SATURATION: 97 % | TEMPERATURE: 97.5 F | RESPIRATION RATE: 16 BRPM | SYSTOLIC BLOOD PRESSURE: 120 MMHG | BODY MASS INDEX: 28.73 KG/M2 | HEART RATE: 50 BPM

## 2023-05-11 DIAGNOSIS — E03.9 PRIMARY HYPOTHYROIDISM: ICD-10-CM

## 2023-05-11 DIAGNOSIS — Z01.818 PRE-OP EXAMINATION: Primary | ICD-10-CM

## 2023-05-11 NOTE — PROGRESS NOTES
[address]    NAME: Erum Sunshine  AGE: 45 y o  SEX: male  : 1970     DATE: 2023    [facility] Pre-Operative Evaluation      Chief Complaint: Pre-operative Evaluation     Surgery: partial replacement of right knee  Anticipated Date of Surgery: 2024  Surgeon: Dr Huma Ervin     History of Present Illness:     HPI   Patient is here for pre op clearance before upcoming surgery  Stated that feels great except right knee pain  Denies chest pain, sob, dizziness, and fatigue  Lost intentionally about 15 pounds in last 6 months with exercising and dietary changes and completely stopped drinking beers from last 7 months and stated that feels so great  Stated that surgeon did not order any preop work up  Routine blood work ordered by PCP and will do that and will check patient's thyroid blood work up    Anesthesia:  unknown  Bleeding Risk: no recent abnormal bleeding, no remote history of abnormal bleeding and no use of Ca-channel blockers  Current Anti-platelet/anticoagulation medication: none    Assessment of Cardiac Risk:  Denies unstable or severe angina or MI in the last 6 weeks or history of stent placement in the last year   Denies decompensated heart failure (e g  New onset heart failure, NYHA functional class IV heart failure, or worsening existing heart failure)  Denies significant arrhythmias such as high grade AV block, symptomatic ventricular arrhythmia, newly recognized ventricular tachycardia, supraventricular tachycardia with resting heart rate >100, or symptomatic bradycardia  Denies severe heart valve disease including aortic stenosis or symptomatic mitral stenosis    Prior Anesthesia Reactions: No,      Personal history of venous thromboembolic disease? No    History of steroid use for >2 weeks within last year? No         Review of Systems:     Review of Systems   Constitutional: Negative      HENT: Negative  Respiratory: Negative  Cardiovascular: Negative  Gastrointestinal: Negative  Genitourinary: Negative  Musculoskeletal: Positive for arthralgias  Skin: Negative  Neurological: Negative  Psychiatric/Behavioral: Negative  Current Problem List:     Patient Active Problem List   Diagnosis   • Allergic rhinitis   • Elevated liver enzymes   • Primary hypothyroidism   • Localized osteoarthritis of right knee   • Organic impotence   • Rosacea   • Lipoma of torso   • History of colon polyps   • Arthritis of right knee       Allergies: Allergies   Allergen Reactions   • Penicillins    • Amoxicillin-Pot Clavulanate Rash       Current Medications:       Current Outpatient Medications:   •  fluticasone (FLONASE) 50 mcg/act nasal spray, 1 spray into each nostril as needed for rhinitis, Disp: , Rfl:   •  Synthroid 100 MCG tablet, take 1 tablet by mouth once daily, Disp: 90 tablet, Rfl: 0    Past Medical History:       Past Medical History:   Diagnosis Date   • Colon polyp    • Disease of thyroid gland    • Hypothyroid    • Pollen allergies    • PTSD (post-traumatic stress disorder)    • Sleep apnea         Past Surgical History:   Procedure Laterality Date   • APPENDECTOMY  1985   • ARTHROSCOPY KNEE Right 6/30/2016    Procedure: ARTHROSCOPY KNEE, MEDIAL MENISCECTOMY;  Surgeon: Brittany Urbina MD;  Location: [facility] MAIN OR;  Service:    • INJECTION ANESTHETIC AGENT TO CERVICAL PLEXUS     • KNEE ARTHROSCOPY Right 1990   • HI COLONOSCOPY FLX DX W/COLLJ SPEC WHEN PFRMD N/A 4/22/2019    Procedure: COLONOSCOPY;  Surgeon: Rafy Parr MD;  Location: [facility]  GI [facility];   Service: Gastroenterology   • HI EXCISION TUMOR SOFT TIS BACK/FLANK SUBQ 3 CM/> N/A 10/6/2022    Procedure: EXCISION  BIOPSY LESION/MASS BACK;  Surgeon: Shawnee Amador DO;  [facility];  Service: General        Family History   Problem Relation Age of Onset   • Breast cancer Mother    • Cancer Sister         3 "sisters thyroid CA   • No Known Problems Father    • Hashimoto's thyroiditis Sister    • Melanoma Maternal Aunt         Social History     Socioeconomic History   • Marital status: /Civil Union     Spouse name: Not on file   • Number of children: Not on file   • Years of education: Not on file   • Highest education level: Not on file   Occupational History   • Not on file   Tobacco Use   • Smoking status: Never     Passive exposure: Never   • Smokeless tobacco: Never   Vaping Use   • Vaping Use: Never used   Substance and Sexual Activity   • Alcohol use: Yes     Alcohol/week: 8 0 standard drinks     Types: 8 Cans of beer per week     Comment: 1 per week   • Drug use: Yes     Frequency: 4 0 times per week     Types: Marijuana   • Sexual activity: Not on file   Other Topics Concern   • Not on file   Social History Narrative   • Not on file     Social Determinants of Health     Financial Resource Strain: Not on file   Food Insecurity: Not on file   Transportation Needs: Not on file   Physical Activity: Not on file   Stress: Not on file   Social Connections: Not on file   Intimate Partner Violence: Not on file   Housing Stability: Not on file        Physical Exam:     /80 (BP Location: Left arm, Patient Position: Sitting, Cuff Size: Standard)   Pulse (!) 50   Temp 97 5 °F (36 4 °C) (Temporal)   Resp 16   Ht 5' 8 5\" (1 74 m)   Wt 88 kg (194 lb)   SpO2 97%   BMI 29 07 kg/m²     Physical Exam  Constitutional:       Appearance: Normal appearance  HENT:      Head: Normocephalic  Nose: Nose normal    Eyes:      Conjunctiva/sclera: Conjunctivae normal    Cardiovascular:      Rate and Rhythm: Normal rate and regular rhythm  Heart sounds: Normal heart sounds  Pulmonary:      Effort: Pulmonary effort is normal       Breath sounds: Normal breath sounds  Musculoskeletal:         General: No swelling or tenderness  Normal range of motion  Skin:     General: Skin is warm and dry        Findings: " No rash  Neurological:      Mental Status: He is alert and oriented to person, place, and time  Psychiatric:         Mood and Affect: Mood normal          Behavior: Behavior normal          Thought Content: Thought content normal          Judgment: Judgment normal           Data:     Pre-operative work-up         EKG: I have personally reviewed pertinent reports  SB without any acute changes and without any ST/T wave abnormalities  EKG also reviewed by Dr Ac Quiñonez too  No results found for this or any previous visit (from the past 672 hour(s))  Assessment & Recommendations:     Problem List Items Addressed This Visit        Endocrine    Primary hypothyroidism     Complaint with current dose of levothyroxine and due for blood work and will do that soon        Other Visit Diagnoses     Pre-op examination    -  Primary    Relevant Orders    POCT ECG (Completed)          Pre-Op Evaluation Assessment  45 y o  male with planned surgery:  partial replacement of right knee  Known risk factors for perioperative complications: None  Current medications which may produce withdrawal symptoms if withheld perioperatively: none  Pre-Op Evaluation Plan  1  Further preoperative workup as follows:   - Complete blood count  - Basic metabolic profile    2  Medication Management/Recommendations:   - Patient has been instructed to avoid herbs or non-directed vitamins the week prior to surgery to ensure no drug interactions with perioperative surgical and anesthetic medications  - Patient has been instructed to avoid aspirin containing medications or non-steroidal anti-inflammatory drugs for the week preceding surgery  3  Prophylaxis for cardiac events with perioperative beta-blockers: not indicated  4  Patient requires further consultation with: None    SANDRA Risk:  GFR:        For PCP:  If GFR>60, Hold ACE/ARB/Diuretic on the day of surgery, and NSAIDS 10 days before      If GFR<45, Consider PRE and POST op Nephrology Consult  If 46 <GFR> 59 : Has Patient had SANDRA in last 6 Months? no   If YES: Preop Nephrology consult   If No:  Deondre Quinn Nephrology consult  Clearance  Clearance pending as waiting for blood work   Have not used sleep apnea machine from [facility]     Jordy Sanders [address]  JANENE [address] [place] [id number]  Phone#  [phone]  Fax#  [phone]

## 2023-05-12 ENCOUNTER — TELEPHONE (OUTPATIENT)
Dept: FAMILY MEDICINE CLINIC | Facility: CLINIC | Age: 53
End: 2023-05-12

## 2023-05-12 DIAGNOSIS — R97.20 ELEVATED PSA: Primary | ICD-10-CM

## 2023-05-12 LAB
MRSA SPEC QL CULT: NOT DETECTED
STAPH AUREUS (SA): NOT DETECTED

## 2023-05-12 NOTE — LETTER
May 13, 2023     Patient: Connie Glover  YOB: 1970      To Whom it May Concern:    Mariano Dennison is under my professional care  Ranjeet Sykes was seen in my office on 5/11/2023  Please see the pre op note, labs and EKG  Patient is medically clear for upcoming surgery at this time  His kidney function has improved and discussed with PCP and ok to proceed with surgery at this time    If you have any questions or concerns, please don't hesitate to call           Sincerely,      Halina Butler,         CC: No Recipients

## 2023-05-12 NOTE — TELEPHONE ENCOUNTER
Samuel Russell,  Do you have the information for his surgeon? He asked that we send the labs to their office    Thank you,  Angie Uriostegui, DO

## 2023-05-12 NOTE — TELEPHONE ENCOUNTER
5/12/2023 10:54  [facility] regarding his lab results  PSA is up to 5 5 we will have him follow-up back with the urologist   We reviewed his other lab results let him know there is nothing that would interfere with his upcoming surgery      He asked that we send his results to his surgeon    Wolf Stevens

## 2023-05-15 PROBLEM — M17.11 PRIMARY OSTEOARTHRITIS OF RIGHT KNEE: Status: ACTIVE | Noted: 2023-04-21

## 2023-05-15 PROBLEM — M25.561 CHRONIC PAIN OF RIGHT KNEE: Status: ACTIVE | Noted: 2023-04-21

## 2023-05-15 RX ORDER — HYDROCODONE BITARTRATE AND ACETAMINOPHEN 7.5; 325 MG/1; MG/1
7.5-325 TABLET ORAL
Qty: 24 | Refills: 0 | Status: ACTIVE | COMMUNITY
Start: 2023-05-15 | End: 1900-01-01

## 2023-05-15 RX ORDER — CEPHALEXIN 500 MG/1
500 CAPSULE ORAL 4 TIMES DAILY
Qty: 20 | Refills: 1 | Status: ACTIVE | COMMUNITY
Start: 2023-05-15 | End: 1900-01-01

## 2023-05-15 NOTE — DISCUSSION/SUMMARY
[de-identified] : Assessment:\par \par 1. Medial compartment osteoarthritis of the knee, Right.\par 2. Surgery scheduled for  [facility] as an outpatient.\par 3. Physical Exam reviewed and consistent with medial compartment painful knee osteoarthritis\par 4. Imaging reviewed showing moderate to severe medial compartment Joint space loss on the right.\par \par \par \par Plan:\par \par 1. The patient would like to proceed with Medial  Partial Knee Replacements\par 2. The side, Right \par 3. The procedure will be Aristides™ Robotic Arm Assisted with [employer] Restoris MCK cemented implants \par 4. The patient discuss surgery information details with our coordinator and he will have all his surgery information provided in a folder for him today.\par 5. We will schedule a Post-Op office visit for after surgery to change dressings and review surgery details.\par 6. We will Schedule the Aristides™ Robotic-Arm System with the OR \par 7. The CT scan with Aristides™ Knee Protocol with Radiology has been completed.\par 8. Medical Clearance has been completed.\par 9. Scheduled PRP in the Operating Room with vendor \par 10. Location will be [facility].\par 10. I have had an informed consent discussion with the patient & documentation was provided\par \par \par \par Discussion Partial Knee Replacement:\par \par I had a discussion with the patient regarding the findings of their history, exam and imaging. We discussed the option of Partial Knee Replacement using the precision Aristides™ Robotic-Arm System. We discussed the indications, surgical process, my techniques of surgery, the probable post-operative course and instructions, and the risks and benefits of the procedure. The patient had their questions answered to their satisfaction.\par \par - The implants will be cemented.\par - The implants used are "Restoris MCK" implants from [employer].\par - The materials are Titanium tibial implants, CoCr Alloy femoral implants, Polyethylene tibial bearings.\par \par Although there are no guarantees to success, I feel that the surgery would be very beneficial and there is an excellent chance for a positive outcome.\par \par The risks of this procedure include but are not limited to the risks of anesthesia, heart attack, stroke, respiratory complications, wound healing problems, skin blisters, delayed wound healing, infection, bleeding, nerve damage, vessel damage, skin sensory changes next to the incisions, loss of motion, scar tissue formation requiring further treatment, blood clots, heterotopic bone formation, implant wear, implant loosening after surgery, allergic responses to the implant materials, continued pain despite proper implant placement, soft tissue pain, continued knee swelling, with partial knee replacement there can be progression of arthritis into the other compartments requiring future surgery, and the possible need for further surgery in the future for scar tissue removal, plastic insert change over time, addition of other compartment replacements, and possible revision to total knee replacement.\par \par I look forward to the opportunity to help this patient with their painful knee condition.\par \par \par \par

## 2023-05-15 NOTE — PHYSICAL EXAM
[de-identified] : Right  Knee/Lower extremity Exam: \par \par Skin: Normal. No erythema, no ecchymosis, no abrasions, no scratches.  \par                 \par Old Incisions: Healed arthroscopy portals. \par \par Knee Joint Swelling: Positive effusion. Mild \par \par Popliteal Swelling: None	\par \par Pre-Patella Bursa Swelling: None. \par \par Alignment: 		        \par Varus, Mild Moderate\par Passively correctable to near neutral.  \par \par Knee Joint Line Tenderness: \par Tender at medial joint line. \par Not tender at the lateral joint line.   \par Not tender in the patellofemoral compartment. \par \par Soft Tissue Tenderness: None. \par \par Medial Collateral Ligament Laxity:  Good endpoint.                                                       \par Medial opening to valgus stress.   Moderate.    \par \par Lateral Collateral Ligament Laxity:          \par Normal laxity. Good endpoint. \par \par ROM Extension: 0 degrees.   \par ROM Flexion: 125+ degrees.   \par \par ACL Testing: 			\par Stable ACL. Good Endpoint.                                                                \par Lachman Test: Negative \par Anterior Drawer Test: Negative \par \par PCL Testing: 			\par Stable PCL.  Good Endpoint.                                                               \par Posterior Drawer Test: Negative \par \par Patella Compression Test: Negative \par \par Patellofemoral Crepitus: None.   \par \par Patellofemoral Apprehension Testing: Negative. \par \par Patellofemoral Laxity: Normal.\par \par Motor Strength: 			\par Quadriceps strength is 5 out of 5                                                                \par Hamstring strength is 5 out of 5                                                               \par Ankle dorsiflexion strength is 5 out of 5                                                              \par Ankle plantarflexion strength is 5 out of 5 \par \par Sensation: Light tough sensation in the lower extremity is grossly normal.  \par                                    \par Pulses: 				\par Pulses are palpable at the ankle at the Dorsalis Pedis Artery.                                                               \par Pulses are palpable at the Posterior Tibialis Artery.                                                               \par \par Osteophytes: There are no palpable osteophytes along the knee margins.                                                                \par \par Hip Motion: The patient has full and painless hip range of motion.                                                                            \par \par Lumbar Spine Symptoms: Negative straight leg raise.                                                              \par \par Gait: Limping Gait.  Abnormal gait.  \par \par Assistive Devices: 	None\par \par \par  [de-identified] : X-ray of the Right Knee:\par \par AP Standing View:\par Medial joint space loss. Moderate.\par Lateral joint space preserved.\par \par PA Flexion View:\par Medial joint space loss. Moderate \par Lateral joint space preserved.\par \par \par Lateral View: \par Patellofemoral joint space preserved.\par \par [place] View: \par Patellofemoral joint space is preserved.\par Patellofemoral joint central tracking.\par \par Bilateral Hip to Ankle Standing View:\par Alignment: Varus\par Medial joint space loss. Moderate.\par Hips: No significant changes\par

## 2023-05-15 NOTE — HISTORY OF PRESENT ILLNESS
[de-identified] : Pawan is a 48 year old male presenting for an evaluation of his arthritic right knee and to discuss medial partial knee arthroplasty.\par \par Chief Complaint / Diagnosis: Right Medial Knee Pain / Osteoarthritis Medial Compartment\par \par Planned Procedure: Partial Knee Replacement, Right Knee, Medial Side\par \par Summary of Pre-operative Consultation:\par \par - The patient is a 52-year-old with right medial knee pain from osteoarthritis. \par - The patient has had pain for years worsening and impacting function and quality of life. \par - Non-surgical treatments and non-arthroplasty procedures have not provided sufficient pain or symptom relief from the knee arthritis.\par - The planned procedure the patient was seen here for today was a right medial partial knee replacement. \par    (Aristides Robotic Arm Assisted, which is my area of expertise).\par - The procedure will be performed at [facility] in [place]. \par - The procedure will be on Wednesday 5.16.2023 \par - The procedure will be performed as an outpatient.\par - The medical clearance was completed.\par - The patient has completed all the steps to prepare for the procedure. \par - The patient has been cleared for surgery. \par - We reviewed the knee imaging today and confirmed the physical exam findings. \par - We discussed procedure details including the benefits and risks and the post-operative process.\par \par Right Knee History: (previous consultation information unchanged)\par Pawan is a 52-year-old white male who is here today with complaint of medial sided right knee pain.  The pain has been going on for several years.  The pain intensity is moderate.  His knee symptoms include pain with activity pain with walking swelling of the knee swelling behind the knee loss of motion limping weakness giving way feeling of instability painful clicking and locking in position at times.  The pain is located on the inner side of the knee.  The pain is constant.  The pain is an aching type of pain.  Activities that hurt include walking walking after sitting, running, turning and twisting, squatting, lunges, rising from a seat, getting on and off toilets, going up and down stairs, getting in and out of cars.\par \par Ashwin is tried anti-inflammatory medications and a regular exercise program with with knee focused exercises.  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

## 2023-05-16 ENCOUNTER — APPOINTMENT (OUTPATIENT)
Age: 53
End: 2023-05-16
Payer: COMMERCIAL

## 2023-05-16 PROCEDURE — 27446 REVISION OF KNEE JOINT: CPT | Mod: RT

## 2023-05-17 ENCOUNTER — OUTPATIENT (OUTPATIENT)
Dept: OUTPATIENT SERVICES | Facility: HOSPITAL | Age: 53
LOS: 1 days | End: 2023-05-17
Payer: COMMERCIAL

## 2023-05-17 ENCOUNTER — APPOINTMENT (OUTPATIENT)
Dept: ORTHOPEDIC SURGERY | Facility: CLINIC | Age: 53
End: 2023-05-17
Payer: COMMERCIAL

## 2023-05-17 VITALS
BODY MASS INDEX: 28.58 KG/M2 | HEIGHT: 69 IN | DIASTOLIC BLOOD PRESSURE: 84 MMHG | SYSTOLIC BLOOD PRESSURE: 134 MMHG | TEMPERATURE: 98.3 F | HEART RATE: 72 BPM | WEIGHT: 193 LBS | OXYGEN SATURATION: 96 %

## 2023-05-17 PROCEDURE — 77073 BONE LENGTH STUDIES: CPT

## 2023-05-17 PROCEDURE — 99024 POSTOP FOLLOW-UP VISIT: CPT

## 2023-05-17 PROCEDURE — 73562 X-RAY EXAM OF KNEE 3: CPT

## 2023-05-17 PROCEDURE — 73562 X-RAY EXAM OF KNEE 3: CPT | Mod: 26,RT

## 2023-05-17 PROCEDURE — 77073 BONE LENGTH STUDIES: CPT | Mod: 26

## 2023-05-20 NOTE — HISTORY OF PRESENT ILLNESS
[de-identified] : Pawan is a 52 year old male who presents today for a post op visit.\par \par Post op Day: 1 day\par Surgery Type: Medial Partial Knee Arthroplasty\par Side of Surgery: Right\par Date of Surgery: 05.16.23\par \par Pain Level: 1\par Assistive Device: cane\par Satisfaction Level: Very Satisfied\par \par Activities: Walking, home exercises, drove to visit. [de-identified] : Pawan is a 52 year old male who presents today for a post op visit. His right medial partial knee went well yesterday. He is using his cold therapy device. He slept well. He drove to the appointment today. He is walking without pain at the moment.\par  [de-identified] : Right Knee Exam: \par  \par Skin:  Healthy appearing. No erythema. Minimal ecchymosis.  No drainage.  Dermabond intact.      \par \par Dressings: Dressings changed today and new dressings provided for changing in one week.           \par \par Knee Joint Swelling: None.\par \par Alignment: Neutral.                                                        \par ROM Extension: 0 degrees.   \par ROM Flexion: 100 degrees.   \par \par Medial Collateral Ligament Laxity:  Normal laxity. Good endpoint. \par \par Lateral Collateral Ligament Laxity: Normal laxity. Good endpoint. \par \par Instability: No flexion or extension instability\par \par Anterior Drawer Test: No significant translation. Good endpoint. \par Posterior Drawer Test:  Stable with good endpoint \par \par Motor Strength: 	\par Quadriceps strength is 5 out of 5 \par Hamstring strength is 5 out of 5 \par Ankle dorsiflexion strength is 5 out of 5 \par Ankle plantarflexion strength is 5 out of 5 \par \par Sensation:  \par Light tough sensation in the lower extremity is grossly normal.  \par Sensory change is located lateral to incision as expected. \par \par Pulses: \par Pulses are palpable at the ankle at the Dorsalis Pedis Artery.  \par Pulses are palpable at the Posterior Tibialis Artery. \par                                                                 \par Gait:  mild limping gait.\par  \par Assistive Devices: None. [de-identified] : Post Op  X-Rays Partial Knee:\par \par Examination of the Knee: Right\par \par Views: AP, Lateral, Merchant, Hip to Ankle \par \par \par AP Standing View:\par Medial Partial Knee Replacement in good position.\par No signs of Loosening.\par No subsidence.\par No fracture.\par Good insert space thickness.\par \par Lateral View: \par Medial  Partial  Knee Replacement in good position.\par Fix is anatomic on both femur and tibia.\par \par [place] View: \par Patellofemoral joint shows central tracking.\par Medial implant tracking is central on the tibial implant.\par \par Bilateral Hip to Ankle Standing View:\par Knee Alignment is in neutral position. [de-identified] : Assessment:\par \par - Post Op Day 1 after Medial Partial Knee Replacement, Aristides Robotic-Assisted.\par - All components cemented.\par - X-rays taken and confirmed implants in good position and no complications.\par - Pain well controlled with blocks still working. Not taking narcotics.\par - Patient has medications that were prescribed.\par - Patient was examined and is doing appropriately for POD #1.\par - Dermabond skin adhesive on incisions is intact.\par - Incision clean and dry with no drainage. \par - No signs of infection.\par - No calf tenderness. Calf is soft. No signs of DVT.\par - Stable collateral ligament exam and cruciate ligament exam. \par - Range of Motion is good with extension and 100 degrees of flexion today.\par - Gait is good without assistive device.\par - Patient is very satisfied so far, understands and has reviewed our protocols and has had their questions answered.\par  [de-identified] : Plan:\par \par Dressings:\par -  Dressing changed today. \par -  Remove and change in one week. Take Photo of incisions and email or text to us for review.\par -  After 3 weeks, may clean skin adhesive off with rubbing alcohol.\par -  May shower with dressing on. They are water proof. If it gets wet inside, remove and change dressing.\par -  When changing dressing each week, you may shower with dressing off, don’t scrub over glue, pat dry and replace dressing.\par \par \par Therapy & Exercises:\par -  Continue Post op protocol\par -  Begin home exercise program and PT.\par -  Full Weight bearing.\par -  Isometric Quad Sets 3 sets of 10, 3 times a day.\par -  Quarter squats at counter Sets 3 sets of 10, 3 times a day.\par -  Ankle pumps throughout the day to improve circulation.\par -  Walker or crutches for safety until progressed by PT\par -  Use Knee Cryo cuff cold therapy at all times when not up and walking for the first 10 days.\par -  Use Knee Cryo cuff cold therapy after exercises, PT and walks for 6 weeks to reduce swelling and discomfort.\par -  Start Outpatient PT preferably in 10 to 14 days to allow swelling to resolve and incision to heal.\par \par Medications:\par -  Continue with ASA orally BID for 4 weeks for DVT prophylaxis.\par -  Finish oral antibiotics prophylaxis.\par -  Tylenol and Ibuprofen for pain as directed.\par -  Hydrocodone or alternative provided for moderate to severe pain.\par \par Follow-up:\par -  Followup in 3 weeks.\par

## 2023-05-22 ENCOUNTER — TELEPHONE (OUTPATIENT)
Dept: UROLOGY | Facility: MEDICAL CENTER | Age: 53
End: 2023-05-22

## 2023-05-22 NOTE — TELEPHONE ENCOUNTER
Patient seen by Daniella Melo in [place]    Patient called stating he did his psa and its elevated 5 5 (05/11/23)   Last psa 02/01/22 was 4 7  Please review and see how soon patient is to be seen        Patient can be reached at [phone]

## 2023-05-23 NOTE — TELEPHONE ENCOUNTER
Marcellina Klinefelter and scheduled him for follow up with PSA - trevon no sex, not riding lownmower or bike heavy lifting 3 days prior to getting blood work

## 2023-05-23 NOTE — TELEPHONE ENCOUNTER
Patient due for yearly appointment  He has hx of negative prostate biopsy in 2019 and mpMRI prostate in July 2022  Please review causes for false elevation, and plan to repeat PSA prior to visit

## 2023-05-25 NOTE — PROGRESS NOTES
PT Evaluation     Today's date: 2023  Patient name: Radha Varela  : 1970  MRN: [id number]  Referring provider: Nuzhat Cool DO  Dx:   Encounter Diagnosis     ICD-10-CM    1  Status post right partial knee replacement  Z96 651       2  Elevated PSA  R97 20 Ambulatory referral to Urology      3  Chronic pain of right knee  M25 561     G89 29           Start Time: 0948  Stop Time: 1028  Total time in clinic (min): 40 minutes    Assessment  Assessment details: Radha Varela is a 45 y o  male who presents with signs and symptoms consistent of s/p R partial knee replacement and post op status  Patient presents with pain, decreased strength, decreased ROM, decreased joint mobility and ambulatory dysfunction  Due to these impairments, Patient has difficulty performing ambulating, prolonged standing, squatting, lifting and driving  Extensively reviewed post op precautions as well as s/s of DVT and what to do should any s/s arise  Pt verbalized understanding  Patient would benefit from skilled physical therapy to address the impairments, improve their level of function, and to improve their overall quality of life  Reviewed HEP, involved anatomy, physio as well as POC with verbalized understanding and pt is in agreement with above    Impairments: abnormal gait, abnormal or restricted ROM, impaired balance, impaired physical strength, lacks appropriate home exercise program, pain with function and poor body mechanics    Goals  Short Term Goals: to be achieved by 4 weeks  1) Patient to be independent with basic HEP  2) Decrease pain to 3/10 at its worst  3) Increase R knee ROM by 5-10 degrees in all affected planes  4) Increase LE strength by 1/2 MMT grade in all affected planes    Long Term Goals: to be achieved by discharge  1) FOTO equal to or greater than projected goal   2) Ambulation to improve to maximal level of function without deviation  3) Stair negotiation will improve to reciprocal  4) Sit to stand transfers will improve to maximal level of function  5) Pt will obtain 0 deg  TKE and at least 110 deg  Knee flexion for greater ease with functional activities such as squatting     Plan  Patient would benefit from: skilled physical therapy  Planned modality interventions: cryotherapy  Planned therapy interventions: abdominal trunk stabilization, ADL training, balance, body mechanics training, home exercise program, functional ROM exercises, flexibility, therapeutic exercise, therapeutic activities, stretching, strengthening, joint mobilization, manual therapy, neuromuscular re-education, patient education, postural training, gait training, compression, Gonzalez taping and massage  Frequency: 2x week  Duration in visits: 16  Duration in weeks: 8  Treatment plan discussed with: patient        Subjective Evaluation    History of Present Illness  Date of surgery: 2023  Mechanism of injury: surgery  Mechanism of injury: History: 23 partial knee replacement performed  States been feeling pretty good; using no AD to get around  Prior to surgery, had sporting accident in  that resulted in surgery-unsure of what was done  Pt notes he re-injured it again recently and got worse with time  Pt reports it was previously painful with WB activities and was unable to do what he likes  Since surgery, standing for longer will increase pain or amb  Been icing it a lot and feels swelling has sig  Gone down  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      Aggravating factors: standing  Relieving factors: ice  Occupation: construction  Status: retired  Hobbies/recreation: hike, working out  Pain  Current pain ratin  At best pain ratin  At worst pain ratin  Relieving factors: ice and rest  Aggravating factors: walking and standing    Social Support  Steps to enter house: yes  Stairs in house: yes   Lives with: spouse    Treatments  Current treatment: physical therapy  Patient Goals  Patient goals for therapy: decreased edema, decreased pain, increased strength, independence with ADLs/IADLs, return to sport/leisure activities and improved balance          Objective     Static Posture     Comments  Increased stance time LLE, poor knee flexion ROM, increased R hip flexion with swing phase, lack of TKE & hip ext  During gait    No s/s of infection, no discharge- recently changed bandages with min  Discharge    Strength/Myotome Testing     Left Knee   Flexion: 4-  Extension: 4+    Right Knee   Flexion: 3  Extension: 3+    General Comments:      Knee Comments  Minimal swelling noted RLE vs LLE  Bruising noted over medial aspect of R knee, ant  shin    WELLS DVT CRITERIA:  DVT Risk   (+1) Active cancer within last 6 months negative  (+1) Paralysis, paresis or recent plaster immobilization of legs  negative  (+1) Recent Bedridden for >3 days or major surgery within last 12 weeks  negative  (+1) Localized tenderness along deep venous system  negative  (+1) Entire leg swelling  negative  (+1) Calf swelling > 3cm compared with asymptomatic leg (10cm below tibial tuberosity)  negative  (+1) Pitting Edema  negative  (+1) Collateral supervicial veins (non-varicose)  negative  (+1) Previously documented DVT  negative    (-2)  Alternative diagnosis more likely (muscle tear, cellulitis, etc)  negative    Score: -2  DVT considered likely in patients with score of 2 or more, unlikely if less than 2      R PROM R AROM   Flexion 80 deg 65 deg   Extension -6 deg -10 deg          Precautions: s/p partial knee replacement      Manuals 5/26            PROM                                                    Neuro Re-Ed             Tandem balance             Squat jensen/weight shift                                                                              Ther Ex             LAQ 3sx10            Seated AAROM knee flex   10sx10            QS 10sx10            Seated HS S 15sx4            HS curls Ther Activity                                       Gait Training                                       Modalities                          Review of goals, precautions, DVT, return to prior level of function, HEP, POC RE

## 2023-05-26 ENCOUNTER — EVALUATION (OUTPATIENT)
Dept: PHYSICAL THERAPY | Facility: CLINIC | Age: 53
End: 2023-05-26

## 2023-05-26 DIAGNOSIS — M25.561 CHRONIC PAIN OF RIGHT KNEE: ICD-10-CM

## 2023-05-26 DIAGNOSIS — Z96.651 STATUS POST RIGHT PARTIAL KNEE REPLACEMENT: Primary | ICD-10-CM

## 2023-05-26 DIAGNOSIS — G89.29 CHRONIC PAIN OF RIGHT KNEE: ICD-10-CM

## 2023-05-26 DIAGNOSIS — R97.20 ELEVATED PSA: ICD-10-CM

## 2023-05-26 NOTE — LETTER
May 26, 2023    Anabel Miranda MD  [address] [id number]    Patient: Camilla Samayoa   YOB: 1970   Date of Visit: 2023     Encounter Diagnosis     ICD-10-CM    1  Status post right partial knee replacement  Z96 651       2  Elevated PSA  R97 20 Ambulatory referral to Urology      3  Chronic pain of right knee  M25 561     G89 29           Dear Dr Valerie Seymour: Thank you for your recent referral of Camilla Samayoa  Please review the attached evaluation summary from Travis's recent visit  Please verify that you agree with the plan of care by signing the attached order  If you have any questions or concerns, please do not hesitate to call  I sincerely appreciate the opportunity to share in the care of one of your patients and hope to have another opportunity to work with you in the near future  Sincerely,    Anita Conrad, PT      Referring Provider:      I certify that I have read the below Plan of Care and certify the need for these services furnished under this plan of treatment while under my care  Anabel Miranda MD  [address]  Via Fax: [phone]          PT Evaluation     Today's date: 2023  Patient name: Camilla Samayoa  : 1970  MRN: [id number]  Referring provider: Luis Miguel Gomez DO  Dx:   Encounter Diagnosis     ICD-10-CM    1  Status post right partial knee replacement  Z96 651       2  Elevated PSA  R97 20 Ambulatory referral to Urology      3  Chronic pain of right knee  M25 561     G89 29           Start Time: 0948  Stop Time: 1028  Total time in clinic (min): 40 minutes    Assessment  Assessment details: Camilla Samayoa is a 45 y o  male who presents with signs and symptoms consistent of s/p R partial knee replacement and post op status  Patient presents with pain, decreased strength, decreased ROM, decreased joint mobility and ambulatory dysfunction   Due to these impairments, Patient has difficulty performing ambulating, prolonged standing, squatting, lifting and driving  Extensively reviewed post op precautions as well as s/s of DVT and what to do should any s/s arise  Pt verbalized understanding  Patient would benefit from skilled physical therapy to address the impairments, improve their level of function, and to improve their overall quality of life  Reviewed HEP, involved anatomy, physio as well as POC with verbalized understanding and pt is in agreement with above  Impairments: abnormal gait, abnormal or restricted ROM, impaired balance, impaired physical strength, lacks appropriate home exercise program, pain with function and poor body mechanics    Goals  Short Term Goals: to be achieved by 4 weeks  1) Patient to be independent with basic HEP  2) Decrease pain to 3/10 at its worst  3) Increase R knee ROM by 5-10 degrees in all affected planes  4) Increase LE strength by 1/2 MMT grade in all affected planes    Long Term Goals: to be achieved by discharge  1) FOTO equal to or greater than projected goal   2) Ambulation to improve to maximal level of function without deviation  3) Stair negotiation will improve to reciprocal  4) Sit to stand transfers will improve to maximal level of function  5) Pt will obtain 0 deg  TKE and at least 110 deg   Knee flexion for greater ease with functional activities such as squatting     Plan  Patient would benefit from: skilled physical therapy  Planned modality interventions: cryotherapy  Planned therapy interventions: abdominal trunk stabilization, ADL training, balance, body mechanics training, home exercise program, functional ROM exercises, flexibility, therapeutic exercise, therapeutic activities, stretching, strengthening, joint mobilization, manual therapy, neuromuscular re-education, patient education, postural training, gait training, compression, Gonzalez taping and massage  Frequency: 2x week  Duration in visits: 16  Duration in weeks: 8  Treatment plan discussed with: patient        Subjective Evaluation    History of Present Illness  Date of surgery: 2023  Mechanism of injury: surgery  Mechanism of injury: History: 23 partial knee replacement performed  States been feeling pretty good; using no AD to get around  Prior to surgery, had sporting accident in  that resulted in surgery-unsure of what was done  Pt notes he re-injured it again recently and got worse with time  Pt reports it was previously painful with WB activities and was unable to do what he likes  Since surgery, standing for longer will increase pain or amb  Been icing it a lot and feels swelling has sig  Gone down  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  Aggravating factors: standing  Relieving factors: ice  Occupation: construction  Status: retired  Hobbies/recreation: hike, working out  Pain  Current pain ratin  At best pain ratin  At worst pain ratin  Relieving factors: ice and rest  Aggravating factors: walking and standing    Social Support  Steps to enter house: yes  Stairs in house: yes   Lives with: spouse    Treatments  Current treatment: physical therapy  Patient Goals  Patient goals for therapy: decreased edema, decreased pain, increased strength, independence with ADLs/IADLs, return to sport/leisure activities and improved balance          Objective     Static Posture     Comments  Increased stance time LLE, poor knee flexion ROM, increased R hip flexion with swing phase, lack of TKE & hip ext  During gait    No s/s of infection, no discharge- recently changed bandages with min   Discharge    Strength/Myotome Testing     Left Knee   Flexion: 4-  Extension: 4+    Right Knee   Flexion: 3  Extension: 3+    General Comments:      Knee Comments  Minimal swelling noted RLE vs LLE  Bruising noted over medial aspect of R knee, ant  shin    NAGI DVT CRITERIA:  DVT Risk   (+1) Active cancer within last 6 months negative  (+1) Paralysis, paresis or recent plaster immobilization of legs  negative  (+1) Recent Bedridden for >3 days or major surgery within last 12 weeks  negative  (+1) Localized tenderness along deep venous system  negative  (+1) Entire leg swelling  negative  (+1) Calf swelling > 3cm compared with asymptomatic leg (10cm below tibial tuberosity)  negative  (+1) Pitting Edema  negative  (+1) Collateral supervicial veins (non-varicose)  negative  (+1) Previously documented DVT  negative    (-2)  Alternative diagnosis more likely (muscle tear, cellulitis, etc)  negative    Score: -2  DVT considered likely in patients with score of 2 or more, unlikely if less than 2      R PROM R AROM   Flexion 80 deg 65 deg   Extension -6 deg -10 deg         Precautions: s/p partial knee replacement      Manuals 5/26            PROM                                                    Neuro Re-Ed             Tandem balance             Squat jensen/weight shift                                                                              Ther Ex             LAQ 3sx10            Seated AAROM knee flex   10sx10            QS 10sx10            Seated HS S 15sx4            HS curls                                                    Ther Activity                                       Gait Training                                       Modalities                          Review of goals, precautions, DVT, return to prior level of function, HEP, POC RE

## 2023-05-30 ENCOUNTER — OFFICE VISIT (OUTPATIENT)
Dept: PHYSICAL THERAPY | Facility: CLINIC | Age: 53
End: 2023-05-30

## 2023-05-30 DIAGNOSIS — M25.561 CHRONIC PAIN OF RIGHT KNEE: ICD-10-CM

## 2023-05-30 DIAGNOSIS — G89.29 CHRONIC PAIN OF RIGHT KNEE: ICD-10-CM

## 2023-05-30 DIAGNOSIS — Z96.651 STATUS POST RIGHT PARTIAL KNEE REPLACEMENT: Primary | ICD-10-CM

## 2023-05-30 NOTE — PROGRESS NOTES
Daily Note     Today's date: 2023  Patient name: Patito Gibson  : 1970  MRN: [id number]  Referring provider: Wale Wilson MD  Dx:   Encounter Diagnosis     ICD-10-CM    1  Status post right partial knee replacement  Z96 651       2  Chronic pain of right knee  M25 561     G89 29           Start Time: 1132  Stop Time: 1227  Total time in clinic (min): 55 minutes    Subjective: The patient presents for the first follow-up appointment, reports that symptoms improved and that he was compliant all of the time with the initial HEP      Objective: See treatment diary below      Assessment: Tolerated treatment well  Patient exhibited good technique with therapeutic exercises and would benefit from continued PT  Pts ROM appeared to have improved over the weekend and following ROM exercises  Progressed tx with good tolerance and pt demoed gait with min  Abnormalities as well as good reciprocal step pattern on short step however in last few degrees struggled with eccentric strength on higher step  Also discussed c pt purchasing new sneakers as he is wearing good [employer] sneakers however are very worn and providing minimal medial support through stance phase  Plan: Continue per plan of care  Progress treatment as tolerated  Precautions: s/p partial knee replacement      Manuals            PROM  Supine flex  , ext  Pat  Mobs  , ext  mob  GII REE                                     Neuro Re-Ed             Tandem balance             Squat jensen/weight shift                                                                              Ther Ex             LAQ 3sx10 3sx20           Seated AAROM knee flex  10sx10 10sx10           QS 10sx10 10sx10           Seated HS S 15sx4 15sx4           HS curls  prone 1# 2x10           NuStep  L1 10'           Calf stretch  15sx4           HRs  SLS 2x10           Bike partial revs             SLR flex  , ext    1# 2x10 ea           Ther Activity steps  Short and high side 2x ea reciprocal                        Gait Training                                       Modalities                          Review of goals, precautions, DVT, return to prior level of function, HEP, POC RE Sneaker discussion- RE

## 2023-06-01 ENCOUNTER — OFFICE VISIT (OUTPATIENT)
Dept: PHYSICAL THERAPY | Facility: CLINIC | Age: 53
End: 2023-06-01

## 2023-06-01 DIAGNOSIS — M25.561 CHRONIC PAIN OF RIGHT KNEE: ICD-10-CM

## 2023-06-01 DIAGNOSIS — Z96.651 STATUS POST RIGHT PARTIAL KNEE REPLACEMENT: Primary | ICD-10-CM

## 2023-06-01 DIAGNOSIS — G89.29 CHRONIC PAIN OF RIGHT KNEE: ICD-10-CM

## 2023-06-01 NOTE — PROGRESS NOTES
"Daily Note     Today's date: 2023  Patient name: Kobi Ho  : 1970  MRN: [id number]  Referring provider: Yong Saez MD  Dx:   Encounter Diagnosis     ICD-10-CM    1  Status post right partial knee replacement  Z96 651       2  Chronic pain of right knee  M25 561     G89 29           Start Time: 1037  Stop Time: 1116  Total time in clinic (min): 39 minutes    Subjective: The patient presents today with a report of soreness  The patient reports improvement in symptoms since previous session  Pt reports he has been going to the gym and walking more and he feels that is where is soreness is coming from  Objective: See treatment diary below      Assessment: Tolerated treatment well  Patient demonstrated fatigue post treatment and would benefit from continued PT  Pt was limited in amount of program we were able to get through today secondary to arriving more than 20 min  Late for session  Plan: Continue per plan of care  Progress treament per protocol  Precautions: s/p partial knee replacement      Manuals           PROM  Supine flex  , ext  8'          Pat  Mobs  , ext  mob  GII REE REE GIII                                    Neuro Re-Ed             Tandem balance             Squat jensen/weight shift                                                                              Ther Ex             LAQ 3sx10 3sx20           Bridge c PBall   3sx20          Seated AAROM knee flex  10sx10 10sx10           QS 10sx10 10sx10           Seated HS S 15sx4 15sx4 15sx4          HS curls  prone 1# 2x10           NuStep  L1 10'           Calf stretch  15sx4 15sx4          HRs  SLS 2x10           Bike    7' move seat fw to furthest c full rev  Each min  SLR flex  , ext  1# 2x10 ea standing abd   Red TB 2x10 ea          Ther Activity             steps  Short and high side 2x ea reciprocal Step up 6\" 20x          Step down (eccentric)   4\" 10x          Gait Training                 " Modalities                          Review of goals, precautions, DVT, return to prior level of function, HEP, POC RE Sneaker discussion- RE

## 2023-06-05 ENCOUNTER — OFFICE VISIT (OUTPATIENT)
Dept: PHYSICAL THERAPY | Facility: CLINIC | Age: 53
End: 2023-06-05
Payer: COMMERCIAL

## 2023-06-05 DIAGNOSIS — Z96.651 STATUS POST RIGHT PARTIAL KNEE REPLACEMENT: Primary | ICD-10-CM

## 2023-06-05 DIAGNOSIS — G89.29 CHRONIC PAIN OF RIGHT KNEE: ICD-10-CM

## 2023-06-05 DIAGNOSIS — M25.561 CHRONIC PAIN OF RIGHT KNEE: ICD-10-CM

## 2023-06-05 PROCEDURE — 97140 MANUAL THERAPY 1/> REGIONS: CPT

## 2023-06-05 PROCEDURE — 97530 THERAPEUTIC ACTIVITIES: CPT

## 2023-06-05 PROCEDURE — 97110 THERAPEUTIC EXERCISES: CPT

## 2023-06-05 NOTE — PROGRESS NOTES
Daily Note     Today's date: 2023  Patient name: Steven Bedoya  : 1970  MRN: [id number]  Referring provider: Jerilyn Lanza MD  Dx:   Encounter Diagnosis     ICD-10-CM    1  Status post right partial knee replacement  Z96 651       2  Chronic pain of right knee  M25 561     G89 29           Start Time: 1221  Stop Time: 1259  Total time in clinic (min): 38 minutes    Subjective: The patient reports no new complaints today  The patient reports improvement in symptoms since previous session and purchased  sneakers reporting improvement in overall soreness levels  Objective: See treatment diary below      Assessment: Tolerated treatment well  Patient demonstrated fatigue post treatment and would benefit from continued PT  Progressed tx to include HS curl and LAQ machine with good tolerance  Pt also demoed improved knee control with eccentric step downs with sig  Less med  Knee collapse secondary to greater medial support at the ankle  Plan: Continue per plan of care  Progress treament per protocol  Precautions: s/p partial knee replacement      Manuals          PROM  Supine flex  , ext  8' supine 6'         Pat  Mobs  , ext  mob  GII REE REE GIII RE GIII                                   Neuro Re-Ed             Tandem balance             Squat jensen/weight shift    NV                                                                          Ther Ex             LAQ 3sx10 3sx20  22# BLE 20         Bridge c PBall   3sx20 3sx20         Seated AAROM knee flex  10sx10 10sx10           QS 10sx10 10sx10           Seated HS S 15sx4 15sx4 15sx4 15sx4         HS curls  prone 1# 2x10  22# 20x         NuStep  L1 10'           Calf stretch  15sx4 15sx4 15sx4         HRs  SLS 2x10  SLS 2x15         Bike    7' move seat fw to furthest c full rev  Each min  7' move fw every 2' to max  SLR flex  , ext  1# 2x10 ea standing abd   Red TB 2x10 ea Red TB 2x10 ea         Ther Activity "           steps  Short and high side 2x ea reciprocal Step up 6\" 20x 6\" step up 20x         Step down (eccentric)   4\" 10x 4\" 15x         Gait Training                                       Modalities                          Review of goals, precautions, DVT, return to prior level of function, HEP, POC RE Sneaker discussion- RE                "

## 2023-06-07 ENCOUNTER — APPOINTMENT (OUTPATIENT)
Dept: PHYSICAL THERAPY | Facility: CLINIC | Age: 53
End: 2023-06-07
Payer: COMMERCIAL

## 2023-06-07 ENCOUNTER — APPOINTMENT (OUTPATIENT)
Dept: ORTHOPEDIC SURGERY | Facility: CLINIC | Age: 53
End: 2023-06-07
Payer: COMMERCIAL

## 2023-06-07 VITALS
DIASTOLIC BLOOD PRESSURE: 96 MMHG | WEIGHT: 193 LBS | BODY MASS INDEX: 28.58 KG/M2 | HEART RATE: 60 BPM | HEIGHT: 69 IN | OXYGEN SATURATION: 98 % | SYSTOLIC BLOOD PRESSURE: 150 MMHG

## 2023-06-07 VITALS — DIASTOLIC BLOOD PRESSURE: 95 MMHG | SYSTOLIC BLOOD PRESSURE: 148 MMHG

## 2023-06-07 PROCEDURE — 99024 POSTOP FOLLOW-UP VISIT: CPT

## 2023-06-08 ENCOUNTER — OFFICE VISIT (OUTPATIENT)
Dept: PHYSICAL THERAPY | Facility: CLINIC | Age: 53
End: 2023-06-08
Payer: COMMERCIAL

## 2023-06-08 DIAGNOSIS — M25.561 CHRONIC PAIN OF RIGHT KNEE: ICD-10-CM

## 2023-06-08 DIAGNOSIS — Z96.651 STATUS POST RIGHT PARTIAL KNEE REPLACEMENT: Primary | ICD-10-CM

## 2023-06-08 DIAGNOSIS — G89.29 CHRONIC PAIN OF RIGHT KNEE: ICD-10-CM

## 2023-06-08 PROCEDURE — 97140 MANUAL THERAPY 1/> REGIONS: CPT

## 2023-06-08 PROCEDURE — 97530 THERAPEUTIC ACTIVITIES: CPT

## 2023-06-08 PROCEDURE — 97110 THERAPEUTIC EXERCISES: CPT

## 2023-06-08 NOTE — PROGRESS NOTES
"Daily Note     Today's date: 2023  Patient name: Michael Henao  : 1970  MRN: [id number]  Referring provider: Clay Dominguez MD  Dx:   Encounter Diagnosis     ICD-10-CM    1  Status post right partial knee replacement  Z96 651       2  Chronic pain of right knee  M25 561     G89 29                    Subjective: Pt reports he saw the surgeon yesterday with overall good report of his progress so far  Pt was advised to watch his BP however he feels it could also be elevated secondary to the poor air quality outside  Objective: See treatment diary below      Assessment: Tolerated treatment well with addition of steamboats  Patient demonstrated fatigue post treatment, exhibited good technique with therapeutic exercises and would benefit from continued PT      Plan: Continue per plan of care  Progress treatment as tolerated  Precautions: s/p partial knee replacement      Manuals         PROM  Supine flex  , ext  8' supine 6'         Pat  Mobs  , ext  mob  GII REE REE GIII RE GIII RE GIII        STM pos  Knee distal HS     RE                     Neuro Re-Ed             Tandem balance             Squat jensen/weight shift    NV                                                                          Ther Ex             LAQ 3sx10 3sx20  22# BLE 20 22# SL 20x        Bridge c PBall   3sx20 3sx20 SL 3\"x10        Seated AAROM knee flex  10sx10 10sx10           QS 10sx10 10sx10           Seated HS S 15sx4 15sx4 15sx4 15sx4         HS curls  prone 1# 2x10  22# 20x 22# 20x SL        NuStep  L1 10'           Calf stretch  15sx4 15sx4 15sx4         HRs  SLS 2x10  SLS 2x15         Bike    7' move seat fw to furthest c full rev  Each min  7' move fw every 2' to max  8' moving fw every 2'        SLR flex  , ext  1# 2x10 ea standing abd  Red TB 2x10 ea Red TB 2x10 ea 10 2# 20x ea steamboats c add & abd          Ther Activity             steps  Short and high side 2x ea reciprocal Step up 6\" " "20x 6\" step up 20x 6\" step 20x up        Step down (eccentric)   4\" 10x 4\" 15x 4\" 20x 1ue on railing        Gait Training                                       Modalities                          Review of goals, precautions, DVT, return to prior level of function, HEP, POC RE Sneaker discussion- RE                "

## 2023-06-12 ENCOUNTER — OFFICE VISIT (OUTPATIENT)
Dept: PHYSICAL THERAPY | Facility: CLINIC | Age: 53
End: 2023-06-12
Payer: COMMERCIAL

## 2023-06-12 DIAGNOSIS — Z96.651 STATUS POST RIGHT PARTIAL KNEE REPLACEMENT: Primary | ICD-10-CM

## 2023-06-12 DIAGNOSIS — M25.561 CHRONIC PAIN OF RIGHT KNEE: ICD-10-CM

## 2023-06-12 DIAGNOSIS — G89.29 CHRONIC PAIN OF RIGHT KNEE: ICD-10-CM

## 2023-06-12 PROCEDURE — 97110 THERAPEUTIC EXERCISES: CPT

## 2023-06-12 PROCEDURE — 97140 MANUAL THERAPY 1/> REGIONS: CPT

## 2023-06-12 PROCEDURE — 97530 THERAPEUTIC ACTIVITIES: CPT

## 2023-06-12 NOTE — PROGRESS NOTES
"Daily Note     Today's date: 2023  Patient name: Gail West  : 1970  MRN: [id number]  Referring provider: Sreekanth Michelle MD  Dx:   Encounter Diagnosis     ICD-10-CM    1  Status post right partial knee replacement  Z96 651       2  Chronic pain of right knee  M25 561     G89 29         Start Time: 1402  Stop Time: 1445  Total time in clinic (min): 43 minutes  Subjective: Pt reports he walked in a parade this weekend going just over a mile leaving his knee sore and uncomfortable that lasted only about a day but did feel he pushed it a little much  Objective: See treatment diary below      Assessment: The PT continued strengthening during today's session  The patient's program was progressed by adding lat  stepping c resistance to promote balance and strength  The patient tolerated manual and active treatment well today  The patient would benefit from further skilled PT services  Pt will be going on a cruise next week and will continue with HEP while away  Pt will follow up after vacation for one visit for PN and will determine if/how long pt will need to continue  Plan: Continue per plan of care  Progress treatment as tolerated  Precautions: s/p partial knee replacement      Manuals        PROM  Supine flex  , ext  8' supine 6'         Pat  Mobs  , ext  mob  GII REE REE GIII RE GIII RE GIII RE GIII       STM pos  Knee distal HS     RE RE c scar massage                    Neuro Re-Ed             Tandem balance             Squat jensen/weight shift    NV                                                                          Ther Ex             LAQ 3sx10 3sx20  22# BLE 20 22# SL 20x SL 22# 20x       Bridge c PBall   3sx20 3sx20 SL 3\"x10 SL 3\"x15       Seated AAROM knee flex   10sx10 10sx10           QS 10sx10 10sx10           Seated HS S 15sx4 15sx4 15sx4 15sx4  15\"x5       HS curls  prone 1# 2x10  22# 20x 22# 20x SL 22# SL 20x       NuStep  L1 10'         " "  Calf stretch  15sx4 15sx4 15sx4         HRs  SLS 2x10  SLS 2x15         Bike    7' move seat fw to furthest c full rev  Each min  7' move fw every 2' to max  8' moving fw every 2' 7' moving fw to max every 3'       SLR flex  , ext  1# 2x10 ea standing abd   Red TB 2x10 ea Red TB 2x10 ea 10 2# 20x ea steamboats c add & abd  10# 20x ea STEAMBOATS       Ther Activity             steps  Short and high side 2x ea reciprocal Step up 6\" 20x 6\" step up 20x 6\" step 20x up 6\" 20x step up       Step down (eccentric)   4\" 10x 4\" 15x 4\" 20x 1ue on railing 20x 1UE on railing 4\"       Gait Training                                       Modalities                          Review of goals, precautions, DVT, return to prior level of function, HEP, POC RE Sneaker discussion- RE                "

## 2023-06-14 ENCOUNTER — OFFICE VISIT (OUTPATIENT)
Dept: PHYSICAL THERAPY | Facility: CLINIC | Age: 53
End: 2023-06-14
Payer: COMMERCIAL

## 2023-06-14 DIAGNOSIS — G89.29 CHRONIC PAIN OF RIGHT KNEE: ICD-10-CM

## 2023-06-14 DIAGNOSIS — M25.561 CHRONIC PAIN OF RIGHT KNEE: ICD-10-CM

## 2023-06-14 DIAGNOSIS — Z96.651 STATUS POST RIGHT PARTIAL KNEE REPLACEMENT: Primary | ICD-10-CM

## 2023-06-14 PROCEDURE — 97530 THERAPEUTIC ACTIVITIES: CPT

## 2023-06-14 PROCEDURE — 97140 MANUAL THERAPY 1/> REGIONS: CPT

## 2023-06-14 PROCEDURE — 97110 THERAPEUTIC EXERCISES: CPT

## 2023-06-14 NOTE — PROGRESS NOTES
"Daily Note     Today's date: 2023  Patient name: Girish Ayala  : 1970  MRN: [id number]  Referring provider: Cuate Barber MD  Dx:   Encounter Diagnosis     ICD-10-CM    1  Status post right partial knee replacement  Z96 651       2  Chronic pain of right knee  M25 561     G89 29           Start Time: 08  Stop Time: 08  Total time in clinic (min): 44 minutes    Subjective: Pt reports feeling a little sore upon arrival after previous session and being still sore from the parade over the weekend  Going away for a week upcoming  Pt reports he also bought a bike and was riding outside causing some soreness  Objective: See treatment diary below      Assessment: Tolerated treatment well including a review and update of HEP for continued work towards achieving goals while away on vacation with good understanding  Patient exhibited good technique with therapeutic exercises and would benefit from continued PT      Plan: Progress note during next visit  Precautions: s/p partial knee replacement      Manuals       PROM  Supine flex  , ext  8' supine 6'         Pat  Mobs  , ext  mob  GII REE REE GIII RE GIII RE GIII RE GIII       STM pos  Knee distal HS     RE RE c scar massage RE c scar massage                   Neuro Re-Ed             Tandem balance             Squat jensen/weight shift    NV                                                                          Ther Ex             LAQ 3sx10 3sx20  22# BLE 20 22# SL 20x SL 22# 20x SL 22# 20x      Bridge c PBall   3sx20 3sx20 SL 3\"x10 SL 3\"x15 SL 3\"x20      Seated AAROM knee flex  10sx10 10sx10           QS 10sx10 10sx10           Seated HS S 15sx4 15sx4 15sx4 15sx4  15\"x5       HS curls  prone 1# 2x10  22# 20x 22# 20x SL 22# SL 20x 22# SL 20x      NuStep  L1 10'           Calf stretch  15sx4 15sx4 15sx4         HRs  SLS 2x10  SLS 2x15         Bike    7' move seat fw to furthest c full rev  Each min   7' move fw " "every 2' to max  8' moving fw every 2' 7' moving fw to max every 3' L8 8'      SLR flex  , ext  1# 2x10 ea standing abd  Red TB 2x10 ea Red TB 2x10 ea 10 2# 20x ea steamboats c add & abd  10# 20x ea STEAMBOATS 10# 20x all      Ther Activity             steps  Short and high side 2x ea reciprocal Step up 6\" 20x 6\" step up 20x 6\" step 20x up 6\" 20x step up       Lat   Tap downs       4\" 2x10      Step down (eccentric)   4\" 10x 4\" 15x 4\" 20x 1ue on railing 20x 1UE on railing 4\" 20x 4\"      Gait Training                                       Modalities                          Review of goals, precautions, DVT, return to prior level of function, HEP, POC RE Sneaker discussion- RE                "

## 2023-06-23 ENCOUNTER — TELEPHONE (OUTPATIENT)
Dept: OBGYN CLINIC | Facility: HOSPITAL | Age: 53
End: 2023-06-23

## 2023-06-23 ENCOUNTER — TELEPHONE (OUTPATIENT)
Dept: UROLOGY | Facility: AMBULATORY SURGERY CENTER | Age: 53
End: 2023-06-23

## 2023-06-23 NOTE — TELEPHONE ENCOUNTER
Patient has a follow up with Faustina Alan in [place] on 8/7/23    Patient calling for recommendations  Patient had an episode of blood in his urine last night into this morning  He is not having any pain but stated he has increased frequency  He also started taking ProstaGenix last Friday      Patient requesting a call back at [phone]

## 2023-06-23 NOTE — TELEPHONE ENCOUNTER
Patient seen a year ago for elevated psa with a negative biopsy 2019  MRI   2/22 - PIRADS 2   He just stated taking Prosta- Gentix - prostate supplement 1 week ago   LM questioning how much blood he is having and or if with every urination

## 2023-06-23 NOTE — TELEPHONE ENCOUNTER
Caller: Self    Doctor: Benjamin Russell    Reason for call: calling to reschedule appt with uro, provided correct number    Call back#: [phone]

## 2023-06-27 ENCOUNTER — OFFICE VISIT (OUTPATIENT)
Dept: PHYSICAL THERAPY | Facility: CLINIC | Age: 53
End: 2023-06-27
Payer: COMMERCIAL

## 2023-06-27 ENCOUNTER — OFFICE VISIT (OUTPATIENT)
Dept: UROLOGY | Facility: AMBULATORY SURGERY CENTER | Age: 53
End: 2023-06-27
Payer: COMMERCIAL

## 2023-06-27 VITALS
SYSTOLIC BLOOD PRESSURE: 120 MMHG | BODY MASS INDEX: 28.73 KG/M2 | HEART RATE: 71 BPM | HEIGHT: 69 IN | DIASTOLIC BLOOD PRESSURE: 82 MMHG | OXYGEN SATURATION: 98 % | WEIGHT: 194 LBS

## 2023-06-27 DIAGNOSIS — Z96.651 STATUS POST RIGHT PARTIAL KNEE REPLACEMENT: Primary | ICD-10-CM

## 2023-06-27 DIAGNOSIS — R97.20 ELEVATED PSA: Primary | ICD-10-CM

## 2023-06-27 DIAGNOSIS — R31.0 GROSS HEMATURIA: ICD-10-CM

## 2023-06-27 DIAGNOSIS — N13.8 BPH WITH OBSTRUCTION/LOWER URINARY TRACT SYMPTOMS: ICD-10-CM

## 2023-06-27 DIAGNOSIS — M25.561 CHRONIC PAIN OF RIGHT KNEE: ICD-10-CM

## 2023-06-27 DIAGNOSIS — G89.29 CHRONIC PAIN OF RIGHT KNEE: ICD-10-CM

## 2023-06-27 DIAGNOSIS — N40.1 BPH WITH OBSTRUCTION/LOWER URINARY TRACT SYMPTOMS: ICD-10-CM

## 2023-06-27 LAB
POST-VOID RESIDUAL VOLUME, ML POC: 23 ML
SL AMB  POCT GLUCOSE, UA: ABNORMAL
SL AMB LEUKOCYTE ESTERASE,UA: ABNORMAL
SL AMB POCT BILIRUBIN,UA: ABNORMAL
SL AMB POCT BLOOD,UA: ABNORMAL
SL AMB POCT CLARITY,UA: CLEAR
SL AMB POCT COLOR,UA: YELLOW
SL AMB POCT KETONES,UA: ABNORMAL
SL AMB POCT NITRITE,UA: ABNORMAL
SL AMB POCT PH,UA: 6.5
SL AMB POCT SPECIFIC GRAVITY,UA: 1.01
SL AMB POCT URINE PROTEIN: ABNORMAL
SL AMB POCT UROBILINOGEN: 0.2

## 2023-06-27 PROCEDURE — 97140 MANUAL THERAPY 1/> REGIONS: CPT

## 2023-06-27 PROCEDURE — 97110 THERAPEUTIC EXERCISES: CPT

## 2023-06-27 NOTE — LETTER
2023    Joie Dong MD  [address] [facility]   [address]  [facility] [id number]    Patient: Lenny Shukla   YOB: 1970   Date of Visit: 2023     Encounter Diagnosis     ICD-10-CM    1  Status post right partial knee replacement  Z96 651       2  Chronic pain of right knee  M25 561     G89 29           Dear Dr Denisse Carpenter: Thank you for your recent referral of Lenny Shukla  Please review the attached evaluation summary from Travis's recent visit  Please verify that you agree with the plan of care by signing the attached order  If you have any questions or concerns, please do not hesitate to call  I sincerely appreciate the opportunity to share in the care of one of your patients and hope to have another opportunity to work with you in the near future  Sincerely,    Cristi Chang      Referring Provider:      I certify that I have read the below Plan of Care and certify the need for these services furnished under this plan of treatment while under my care  Joie Dong MD  [address] [place] [address]  Via Fax: [phone]          PT Discharge    Today's date: 2023  Patient name: Lenny Shukla  : 1970  MRN: [id number]  Referring provider: Jozef Galan MD  Dx:   Encounter Diagnosis     ICD-10-CM    1  Status post right partial knee replacement  Z96 651       2  Chronic pain of right knee  M25 561     G89 29         Start Time: 0946  Stop Time: 1016  Total time in clinic (min): 30 minutes  Subjective: Pt feels he is 90% better in comparison with where he started on IE  Pt was able to manage HEP on his own over vacation and has returned to the gym working on his rehab with no issues  Pt will be following up with surgeon this week as well          Objective: See treatment diary below    Static Posture     Comments  Increased stance time LLE, poor knee flexion ROM, increased R hip flexion with swing phase, lack of TKE & hip ext  During gait    Strength/Myotome Testing     Left Knee   Flexion: 4-  Extension: 4+    Right Knee   Flexion: 4-  Extension: 4+  R Hip  IR: 4  ER: 4  General Comments:    Knee Comments  Minimal swelling noted RLE vs LLE  Bruising noted over medial aspect of R knee, ant  shin      R PROM R AROM   Flexion 131 deg 130 deg   Extension 0 deg -2 deg       Goals  Short Term Goals: to be achieved by 4 weeks  1) Patient to be independent with basic HEP-MET  2) Decrease pain to 3/10 at its worst-MET  3) Increase R knee ROM by 5-10 degrees in all affected planes-MET  4) Increase LE strength by 1/2 MMT grade in all affected planes-MET    Long Term Goals: to be achieved by discharge  1) FOTO equal to or greater than projected goal-MET  2) Ambulation to improve to maximal level of function without deviation-MET  3) Stair negotiation will improve to reciprocal-MET  4) Sit to stand transfers will improve to maximal level of function-MET  5) Pt will obtain 0 deg  TKE and at least 110 deg  Knee flexion for greater ease with functional activities such as squatting-MET    Assessment: Pt presents to PT for d/c with improvements in ROM, strength, pain levels and overall functional activity tolerance  Pt has returned to activities pain free and is able to manage symptoms on own with HEP  HEP has been provided and reviewed with pt with verbalized understanding  Pt is in agreement with above POC  Plan: The patient has met or exceeded his short & long term goals and is a candidate for discharge from formal PT care to an independent home program     Precautions: s/p partial knee replacement      Manuals 5/26 5/30 6/1 6/5 6/8 6/12 6/14 6/27     PROM  Supine flex  , ext  8' supine 6'         Pat  Mobs  , ext  mob  GII REE REE GIII RE GIII RE GIII RE GIII  GIII     STM pos   Knee distal HS     RE RE c scar massage RE c scar massage RE scar massage                  Neuro Re-Ed             Tandem balance "    Squat jensen/weight shift    NV                                                                          Ther Ex             LAQ 3sx10 3sx20  22# BLE 20 22# SL 20x SL 22# 20x SL 22# 20x SL 33# 30x ea     Bridge c PBall   3sx20 3sx20 SL 3\"x10 SL 3\"x15 SL 3\"x20      Seated AAROM knee flex  10sx10 10sx10           QS 10sx10 10sx10           Seated HS S 15sx4 15sx4 15sx4 15sx4  15\"x5       HS curls  prone 1# 2x10  22# 20x 22# 20x SL 22# SL 20x 22# SL 20x 22# SL 30x ea     NuStep  L1 10'           Calf stretch  15sx4 15sx4 15sx4         HRs  SLS 2x10  SLS 2x15         Bike    7' move seat fw to furthest c full rev  Each min  7' move fw every 2' to max  8' moving fw every 2' 7' moving fw to max every 3' L8 8' L8 8'     SLR flex  , ext  1# 2x10 ea standing abd  Red TB 2x10 ea Red TB 2x10 ea 10 2# 20x ea steamboats c add & abd  10# 20x ea STEAMBOATS 10# 20x all      Ther Activity             steps  Short and high side 2x ea reciprocal Step up 6\" 20x 6\" step up 20x 6\" step 20x up 6\" 20x step up       Lat   Tap downs       4\" 2x10      Step down (eccentric)   4\" 10x 4\" 15x 4\" 20x 1ue on railing 20x 1UE on railing 4\" 20x 4\"      Gait Training                                       Modalities                          Review of goals, precautions, DVT, return to prior level of function, HEP, POC RE Sneaker discussion- RE      RE- measurements MMT HEP review                         "

## 2023-06-27 NOTE — PROGRESS NOTES
6/27/2023      Chief Complaint   Patient presents with   • Urinary Frequency   • Elevated PSA     Assessment and Plan    1  Elevated PSA  · Status post negative prostate biopsy 7/2019  · Status post MRI of prostate with PI-RADS 2 score and prostate size of 46 mL  · PSA performed 5/11/2023 resulted 5 5  · Repeat PSA in 6 months     2  Gross Hematuria   3  Worsening LUTS- frequency/urgency  · Urine for cytology ordered   · CT renal study ordered  · Schedule cystoscopy  · Discussed need to maintain adequate hydration while avoiding bladder retaining foods and beverages  · Follow-up in the office in 1 month with cystoscopy for physician    4  Decreased libido   · Readdress after completion of above work-up    History of Present Illness  Miri Spicer is a 45 y o  male here for follow up evaluation of urinary symptoms secondary to benign prostatic hyperplasia  Patient with a history of elevated PSA status post prostate biopsy 7/2019  Since then he has had an MRI of his prostate with PI-RADS 2 score and size of prostate noted to be approximately 46 mL  PSA trend below  Patient reports recently going on a cruise and developing gross hematuria while on his cruise  He denies pain or discomfort  He denies dysuria and hematuria  Patient reports increasing urinary frequency and urgency and is requesting further information for possible UroLift procedure  Review of Systems   Constitutional: Negative for chills and fever  Respiratory: Negative for cough and shortness of breath  Cardiovascular: Negative for chest pain  Gastrointestinal: Negative for abdominal distention, abdominal pain, blood in stool, nausea and vomiting  Genitourinary: Positive for frequency, hematuria and urgency  Negative for difficulty urinating, dysuria, enuresis and flank pain  Skin: Negative for rash       Past Medical History  Past Medical History:   Diagnosis Date   • Colon polyp    • Disease of thyroid gland    • Hypothyroid    • Pollen allergies    • PTSD (post-traumatic stress disorder)    • Sleep apnea        Past Social History  Past Surgical History:   Procedure Laterality Date   • APPENDECTOMY  1985   • ARTHROSCOPY KNEE Right 6/30/2016    Procedure: ARTHROSCOPY KNEE, MEDIAL MENISCECTOMY;  Surgeon: Todd Yarbrough MD;  Location: [facility] MAIN OR;  Service:    • INJECTION ANESTHETIC AGENT TO CERVICAL PLEXUS     • KNEE ARTHROSCOPY Right 1990   • MS COLONOSCOPY FLX DX W/COLLJ SPEC WHEN PFRMD N/A 4/22/2019    Procedure: COLONOSCOPY;  Surgeon: Anna Marie Oseguera MD;  Location: [facility]  GI LAB; Service: Gastroenterology   • MS EXCISION TUMOR SOFT TIS BACK/FLANK SUBQ 3 CM/> N/A 10/6/2022    Procedure: EXCISION  BIOPSY LESION/MASS BACK;  Surgeon: Emmy Amador DO;  Location: [facility] MAIN OR;  Service: General     Social History     Tobacco Use   Smoking Status Never   • Passive exposure: Never   Smokeless Tobacco Never       Past Family History  Family History   Problem Relation Age of Onset   • Breast cancer Mother    • Cancer Sister         3 sisters thyroid CA   • No Known Problems Father    • Hashimoto's thyroiditis Sister    • Melanoma Maternal Aunt        Past Social history  Social History     Socioeconomic History   • Marital status: /Civil Union     Spouse name: Not on file   • Number of children: Not on file   • Years of education: Not on file   • Highest education level: Not on file   Occupational History   • Not on file   Tobacco Use   • Smoking status: Never     Passive exposure: Never   • Smokeless tobacco: Never   Vaping Use   • Vaping Use: Never used   Substance and Sexual Activity   • Alcohol use:  Yes     Alcohol/week: 8 0 standard drinks of alcohol     Types: 8 Cans of beer per week     Comment: 1 per week   • Drug use: Yes     Frequency: 4 0 times per week     Types: Marijuana   • Sexual activity: Not on file   Other Topics Concern   • Not on file   Social History Narrative   • Not on file     Social Determinants of "Health     Financial Resource Strain: Not on file   Food Insecurity: Not on file   Transportation Needs: Not on file   Physical Activity: Not on file   Stress: Not on file   Social Connections: Not on file   Intimate Partner Violence: Not on file   Housing Stability: Not on file       Current Medications  Current Outpatient Medications   Medication Sig Dispense Refill   • fluticasone (FLONASE) 50 mcg/act nasal spray 1 spray into each nostril as needed for rhinitis     • Synthroid 100 MCG tablet take 1 tablet by mouth once daily 90 tablet 0     No current facility-administered medications for this visit  Allergies  Allergies   Allergen Reactions   • Penicillins    • Amoxicillin-Pot Clavulanate Rash         The following portions of the patient's history were reviewed and updated as appropriate: allergies, current medications, past medical history, past social history, past surgical history and problem list       Vitals  Vitals:    06/27/23 1524   BP: 120/82   Pulse: 71   SpO2: 98%   Weight: 88 kg (194 lb)   Height: 5' 8 5\" (1 74 m)           Physical Exam  Physical Exam  Vitals reviewed  Constitutional:       General: He is not in acute distress  Appearance: Normal appearance  He is normal weight  HENT:      Head: Normocephalic  Eyes:      Pupils: Pupils are equal, round, and reactive to light  Cardiovascular:      Rate and Rhythm: Normal rate  Pulmonary:      Effort: No respiratory distress  Breath sounds: Normal breath sounds  Skin:     General: Skin is warm and dry  Neurological:      General: No focal deficit present  Mental Status: He is alert and oriented to person, place, and time     Psychiatric:         Mood and Affect: Mood normal          Behavior: Behavior normal            Results  No results found for this or any previous visit (from the past 1 hour(s)) ]  Lab Results   Component Value Date    PSA 5 5 (H) 05/11/2023    PSA 4 7 (H) 02/01/2022    PSA 4 0 01/03/2020     Lab " Results   Component Value Date    GLUCOSE 92 09/02/2016    CALCIUM 9 3 09/21/2022     09/02/2016    K 5 2 05/11/2023    CO2 23 05/11/2023     05/11/2023    BUN 25 (H) 05/11/2023    CREATININE 1 39 (H) 05/11/2023     Lab Results   Component Value Date    WBC 4 9 05/11/2023    HGB 15 4 05/11/2023    HCT 44 5 05/11/2023    MCV 94 05/11/2023     05/11/2023           Orders  Orders Placed This Encounter   Procedures   • Cystoscopy     BPH with worsening LUTS, and gross hematuria     Standing Status:   Future     Standing Expiration Date:   6/27/2024   • CT renal protocol     Standing Status:   Future     Standing Expiration Date:   6/27/2027     Scheduling Instructions:      Nothing to eat 3 hours prior to your test   Clear liquids are also permitted up until the time of the scan  Clear liquids include water, black coffee or tea, apple juice or clear broth  If possible wear clothing without any metal in the abdomen area  Sweat suit, shorts, sports bra or bra without underwire may eliminate the need to change  Please bring your insurance cards, a form of photo ID and a list of your medications with you  Arrive 15 minutes prior to your appointment time in order to register  On the day of your test, please bring any prior CT or MRI studies of this area with you that were not performed at a [facility]  To schedule this appointment, please contact Central Scheduling at [phone]  Order Specific Question:   What is the patient's sedation requirement? If Medication for Claustrophobia is selected, order medication at this point  Answer:   No Sedation     Order Specific Question:   Contrast information:     Answer:   IV     Order Specific Question:   Did the patient ever have a reaction to x-ray dye? If yes, please verify the type of allergy and order the contrast allergy prep       Answer:   No     Order Specific Question:   Release to patient through [facility]     Answer: Immediate     Order Specific Question:   Reason for Exam (FREE TEXT)     Answer:   gross hematuria   • Basic metabolic panel     This is a patient instruction: Patient fasting for 8 hours or longer recommended       Standing Status:   Future     Number of Occurrences:   1     Standing Expiration Date:   6/27/2024   • PSA Total, Diagnostic     Standing Status:   Future     Number of Occurrences:   1     Standing Expiration Date:   6/27/2024   • POCT urine dip   • POCT Measure PVR       PRIMO Serrato

## 2023-06-27 NOTE — TELEPHONE ENCOUNTER
Called monty back he said blood stopped  his PSA  Is 5/5 and asking if he could be see sooner   There was an opneing today and scheduled him

## 2023-06-27 NOTE — LETTER
2023    Lj Singh MD  1 [facility]   Floor 6  [address] [address]    Patient: Glenetta Sandifer   YOB: 1970   Date of Visit: 2023     Encounter Diagnosis     ICD-10-CM    1. Status post right partial knee replacement  Z96.651       2. Chronic pain of right knee  M25.561     G89.29           Dear Dr. Frankey Archer: Thank you for your recent referral of Glenetta Sandifer. Please review the attached evaluation summary from Travis's recent visit. Please verify that you agree with the plan of care by signing the attached order. If you have any questions or concerns, please do not hesitate to call. I sincerely appreciate the opportunity to share in the care of one of your patients and hope to have another opportunity to work with you in the near future. Sincerely,    Apollo Pereyra DPT      Referring Provider:      I certify that I have read the below Plan of Care and certify the need for these services furnished under this plan of treatment while under my care. Lj Singh MD  [address] [place] [address]  Via Fax: [phone]          PT Discharge    Today's date: 2023  Patient name: Glenetta Sandifer  : 1970  MRN: [id number]  Referring provider: Dyanna Spatz, MD  Dx:   Encounter Diagnosis     ICD-10-CM    1. Status post right partial knee replacement  Z96.651       2. Chronic pain of right knee  M25.561     G89.29         Start Time: 0946  Stop Time: 1016  Total time in clinic (min): 30 minutes  Subjective: Pt feels he is 90% better in comparison with where he started on IE. Pt was able to manage HEP on his own over vacation and has returned to the gym working on his rehab with no issues. Pt will be following up with surgeon this week as well.         Objective: See treatment diary below    Static Posture     Comments  Increased stance time LLE, poor knee flexion ROM, increased R hip flexion with swing phase, lack of TKE & hip ext. During gait    Strength/Myotome Testing     Left Knee   Flexion: 4-  Extension: 4+    Right Knee   Flexion: 4-  Extension: 4+  R Hip  IR: 4  ER: 4  General Comments:    Knee Comments  Minimal swelling noted RLE vs LLE  Bruising noted over medial aspect of R knee, ant. shin      R PROM R AROM   Flexion 131 deg 130 deg   Extension 0 deg -2 deg       Goals  Short Term Goals: to be achieved by 4 weeks  1) Patient to be independent with basic HEP-MET  2) Decrease pain to 3/10 at its worst-MET  3) Increase R knee ROM by 5-10 degrees in all affected planes-MET  4) Increase LE strength by 1/2 MMT grade in all affected planes-MET    Long Term Goals: to be achieved by discharge  1) FOTO equal to or greater than projected goal-MET  2) Ambulation to improve to maximal level of function without deviation-MET  3) Stair negotiation will improve to reciprocal-MET  4) Sit to stand transfers will improve to maximal level of function-MET  5) Pt will obtain 0 deg. TKE and at least 110 deg. Knee flexion for greater ease with functional activities such as squatting-MET    Assessment: Pt presents to PT for d/c with improvements in ROM, strength, pain levels and overall functional activity tolerance. Pt has returned to activities pain free and is able to manage symptoms on own with HEP. HEP has been provided and reviewed with pt with verbalized understanding. Pt is in agreement with above POC. Plan: The patient has met or exceeded his short & long term goals and is a candidate for discharge from formal PT care to an independent home program.    Precautions: s/p partial knee replacement      Manuals 5/26 5/30 6/1 6/5 6/8 6/12 6/14 6/27     PROM  Supine flex. , ext. 8' supine 6'         Pat. Mobs. , ext. mob  GII REE REE GIII RE GIII RE GIII RE GIII  GIII     STM pos.  Knee distal HS     RE RE c scar massage RE c scar massage RE scar massage                  Neuro Re-Ed             Tandem balance             Squat jensen/weight shift    NV                                                                          Ther Ex             LAQ 3sx10 3sx20  22# BLE 20 22# SL 20x SL 22# 20x SL 22# 20x SL 33# 30x ea     Bridge c PBall   3sx20 3sx20 SL 3"x10 SL 3"x15 SL 3"x20      Seated AAROM knee flex. 10sx10 10sx10           QS 10sx10 10sx10           Seated HS S 15sx4 15sx4 15sx4 15sx4  15"x5       HS curls  prone 1# 2x10  22# 20x 22# 20x SL 22# SL 20x 22# SL 20x 22# SL 30x ea     NuStep  L1 10'           Calf stretch  15sx4 15sx4 15sx4         HRs  SLS 2x10  SLS 2x15         Bike    7' move seat fw to furthest c full rev. Each min. 7' move fw every 2' to max. 8' moving fw every 2' 7' moving fw to max every 3' L8 8' L8 8'     SLR flex. , ext. 1# 2x10 ea standing abd. Red TB 2x10 ea Red TB 2x10 ea 10.2# 20x ea steamboats c add & abd. 10# 20x ea STEAMBOATS 10# 20x all      Ther Activity             steps  Short and high side 2x ea reciprocal Step up 6" 20x 6" step up 20x 6" step 20x up 6" 20x step up       Lat.  Tap downs       4" 2x10      Step down (eccentric)   4" 10x 4" 15x 4" 20x 1ue on railing 20x 1UE on railing 4" 20x 4"      Gait Training                                       Modalities                          Review of goals, precautions, DVT, return to prior level of function, HEP, POC RE Sneaker discussion- RE      RE- measurements MMT HEP review

## 2023-06-27 NOTE — PROGRESS NOTES
PT Discharge    Today's date: 2023  Patient name: Lenny Shukla  : 1970  MRN: [id number]  Referring provider: Jozef Galan MD  Dx:   Encounter Diagnosis     ICD-10-CM    1  Status post right partial knee replacement  Z96 651       2  Chronic pain of right knee  M25 561     G89 29         Start Time: 0946  Stop Time: 1016  Total time in clinic (min): 30 minutes  Subjective: Pt feels he is 90% better in comparison with where he started on IE  Pt was able to manage HEP on his own over vacation and has returned to the gym working on his rehab with no issues  Pt will be following up with surgeon this week as well  Objective: See treatment diary below    Static Posture     Comments  Increased stance time LLE, poor knee flexion ROM, increased R hip flexion with swing phase, lack of TKE & hip ext  During gait    Strength/Myotome Testing     Left Knee   Flexion: 4-  Extension: 4+    Right Knee   Flexion: 4-  Extension: 4+  R Hip  IR: 4  ER: 4  General Comments:    Knee Comments  Minimal swelling noted RLE vs LLE  Bruising noted over medial aspect of R knee, ant  shin      R PROM R AROM   Flexion 131 deg 130 deg   Extension 0 deg -2 deg       Goals  Short Term Goals: to be achieved by 4 weeks  1) Patient to be independent with basic HEP-MET  2) Decrease pain to 3/10 at its worst-MET  3) Increase R knee ROM by 5-10 degrees in all affected planes-MET  4) Increase LE strength by 1/2 MMT grade in all affected planes-MET    Long Term Goals: to be achieved by discharge  1) FOTO equal to or greater than projected goal-MET  2) Ambulation to improve to maximal level of function without deviation-MET  3) Stair negotiation will improve to reciprocal-MET  4) Sit to stand transfers will improve to maximal level of function-MET  5) Pt will obtain 0 deg  TKE and at least 110 deg   Knee flexion for greater ease with functional activities such as squatting-MET    Assessment: Pt presents to PT for d/c with "improvements in ROM, strength, pain levels and overall functional activity tolerance  Pt has returned to activities pain free and is able to manage symptoms on own with HEP  HEP has been provided and reviewed with pt with verbalized understanding  Pt is in agreement with above POC  Plan: The patient has met or exceeded his short & long term goals and is a candidate for discharge from formal PT care to an independent home program      Precautions: s/p partial knee replacement      Manuals 5/26 5/30 6/1 6/5 6/8 6/12 6/14 6/27     PROM  Supine flex  , ext  8' supine 6'         Pat  Mobs  , ext  mob  GII REE REE GIII RE GIII RE GIII RE GIII  GIII     STM pos  Knee distal HS     RE RE c scar massage RE c scar massage RE scar massage                  Neuro Re-Ed             Tandem balance             Squat jensen/weight shift    NV                                                                          Ther Ex             LAQ 3sx10 3sx20  22# BLE 20 22# SL 20x SL 22# 20x SL 22# 20x SL 33# 30x ea     Bridge c PBall   3sx20 3sx20 SL 3\"x10 SL 3\"x15 SL 3\"x20      Seated AAROM knee flex  10sx10 10sx10           QS 10sx10 10sx10           Seated HS S 15sx4 15sx4 15sx4 15sx4  15\"x5       HS curls  prone 1# 2x10  22# 20x 22# 20x SL 22# SL 20x 22# SL 20x 22# SL 30x ea     NuStep  L1 10'           Calf stretch  15sx4 15sx4 15sx4         HRs  SLS 2x10  SLS 2x15         Bike    7' move seat fw to furthest c full rev  Each min  7' move fw every 2' to max  8' moving fw every 2' 7' moving fw to max every 3' L8 8' L8 8'     SLR flex  , ext  1# 2x10 ea standing abd  Red TB 2x10 ea Red TB 2x10 ea 10 2# 20x ea steamboats c add & abd  10# 20x ea STEAMBOATS 10# 20x all      Ther Activity             steps  Short and high side 2x ea reciprocal Step up 6\" 20x 6\" step up 20x 6\" step 20x up 6\" 20x step up       Lat   Tap downs       4\" 2x10      Step down (eccentric)   4\" 10x 4\" 15x 4\" 20x 1ue on railing 20x 1UE on railing 4\" 20x 4\"      Gait " Training                                       Modalities                          Review of goals, precautions, DVT, return to prior level of function, HEP, POC RE Sneaker discussion- RE      RE- measurements MMT HEP review

## 2023-06-27 NOTE — LETTER
2023    Alexey Veloz MD  [address] [address]    Patient: Mayra Liu   YOB: 1970   Date of Visit: 2023     Encounter Diagnosis     ICD-10-CM    1  Status post right partial knee replacement  Z96 651       2  Chronic pain of right knee  M25 561     G89 29           Dear Dr Jan Pierre: Thank you for your recent referral of Mayra Liu  Please review the attached evaluation summary from Travis's recent visit  Please verify that you agree with the plan of care by signing the attached order  If you have any questions or concerns, please do not hesitate to call  I sincerely appreciate the opportunity to share in the care of one of your patients and hope to have another opportunity to work with you in the near future  Sincerely,    Shahram Enamorado PT      Referring Provider:      I certify that I have read the below Plan of Care and certify the need for these services furnished under this plan of treatment while under my care  Alexey Veloz MD  [address]  Via Fax: [phone]          PT Discharge    Today's date: 2023  Patient name: Mayra Liu  : 1970  MRN: [id number]  Referring provider: Jodi Costello MD  Dx:   Encounter Diagnosis     ICD-10-CM    1  Status post right partial knee replacement  Z96 651       2  Chronic pain of right knee  M25 561     G89 29         Start Time: 0946  Stop Time: 1016  Total time in clinic (min): 30 minutes  Subjective: Pt feels he is 90% better in comparison with where he started on IE  Pt was able to manage HEP on his own over vacation and has returned to the gym working on his rehab with no issues  Pt will be following up with surgeon this week as well          Objective: See treatment diary below    Static Posture     Comments  Increased stance time LLE, poor knee flexion ROM, increased R hip flexion with swing phase, lack of TKE & hip ext  During gait    Strength/Myotome Testing     Left Knee   Flexion: 4-  Extension: 4+    Right Knee   Flexion: 4-  Extension: 4+  R Hip  IR: 4  ER: 4  General Comments:    Knee Comments  Minimal swelling noted RLE vs LLE  Bruising noted over medial aspect of R knee, ant  shin      R PROM R AROM   Flexion 131 deg 130 deg   Extension 0 deg -2 deg       Goals  Short Term Goals: to be achieved by 4 weeks  1) Patient to be independent with basic HEP-MET  2) Decrease pain to 3/10 at its worst-MET  3) Increase R knee ROM by 5-10 degrees in all affected planes-MET  4) Increase LE strength by 1/2 MMT grade in all affected planes-MET    Long Term Goals: to be achieved by discharge  1) FOTO equal to or greater than projected goal-MET  2) Ambulation to improve to maximal level of function without deviation-MET  3) Stair negotiation will improve to reciprocal-MET  4) Sit to stand transfers will improve to maximal level of function-MET  5) Pt will obtain 0 deg  TKE and at least 110 deg  Knee flexion for greater ease with functional activities such as squatting-MET    Assessment: Pt presents to PT for d/c with improvements in ROM, strength, pain levels and overall functional activity tolerance  Pt has returned to activities pain free and is able to manage symptoms on own with HEP  HEP has been provided and reviewed with pt with verbalized understanding  Pt is in agreement with above POC  Plan: The patient has met or exceeded his short & long term goals and is a candidate for discharge from formal PT care to an independent home program     Precautions: s/p partial knee replacement      Manuals 5/26 5/30 6/1 6/5 6/8 6/12 6/14 6/27     PROM  Supine flex  , ext  8' supine 6'         Pat  Mobs  , ext  mob  GII REE REE GIII RE GIII RE GIII RE GIII  GIII     STM pos   Knee distal HS     RE RE c scar massage RE c scar massage RE scar massage                  Neuro Re-Ed             Tandem balance             Squat jensen/weight shift "   NV                                                                          Ther Ex             LAQ 3sx10 3sx20  22# BLE 20 22# SL 20x SL 22# 20x SL 22# 20x SL 33# 30x ea     Bridge c PBall   3sx20 3sx20 SL 3\"x10 SL 3\"x15 SL 3\"x20      Seated AAROM knee flex  10sx10 10sx10           QS 10sx10 10sx10           Seated HS S 15sx4 15sx4 15sx4 15sx4  15\"x5       HS curls  prone 1# 2x10  22# 20x 22# 20x SL 22# SL 20x 22# SL 20x 22# SL 30x ea     NuStep  L1 10'           Calf stretch  15sx4 15sx4 15sx4         HRs  SLS 2x10  SLS 2x15         Bike    7' move seat fw to furthest c full rev  Each min  7' move fw every 2' to max  8' moving fw every 2' 7' moving fw to max every 3' L8 8' L8 8'     SLR flex  , ext  1# 2x10 ea standing abd  Red TB 2x10 ea Red TB 2x10 ea 10 2# 20x ea steamboats c add & abd  10# 20x ea STEAMBOATS 10# 20x all      Ther Activity             steps  Short and high side 2x ea reciprocal Step up 6\" 20x 6\" step up 20x 6\" step 20x up 6\" 20x step up       Lat   Tap downs       4\" 2x10      Step down (eccentric)   4\" 10x 4\" 15x 4\" 20x 1ue on railing 20x 1UE on railing 4\" 20x 4\"      Gait Training                                       Modalities                          Review of goals, precautions, DVT, return to prior level of function, HEP, POC RE Sneaker discussion- RE      RE- measurements MMT HEP review                         "

## 2023-07-05 ENCOUNTER — APPOINTMENT (OUTPATIENT)
Dept: ORTHOPEDIC SURGERY | Facility: CLINIC | Age: 53
End: 2023-07-05
Payer: COMMERCIAL

## 2023-07-05 ENCOUNTER — OUTPATIENT (OUTPATIENT)
Dept: OUTPATIENT SERVICES | Facility: HOSPITAL | Age: 53
LOS: 1 days | End: 2023-07-05
Payer: COMMERCIAL

## 2023-07-05 VITALS
RESPIRATION RATE: 18 BRPM | BODY MASS INDEX: 28.58 KG/M2 | OXYGEN SATURATION: 97 % | SYSTOLIC BLOOD PRESSURE: 153 MMHG | WEIGHT: 193 LBS | HEIGHT: 69 IN | HEART RATE: 77 BPM | DIASTOLIC BLOOD PRESSURE: 99 MMHG

## 2023-07-05 PROCEDURE — 77073 BONE LENGTH STUDIES: CPT

## 2023-07-05 PROCEDURE — 73562 X-RAY EXAM OF KNEE 3: CPT | Mod: 26,RT

## 2023-07-05 PROCEDURE — 77073 BONE LENGTH STUDIES: CPT | Mod: 26

## 2023-07-05 PROCEDURE — 99024 POSTOP FOLLOW-UP VISIT: CPT

## 2023-07-05 PROCEDURE — 73562 X-RAY EXAM OF KNEE 3: CPT

## 2023-07-05 NOTE — HISTORY OF PRESENT ILLNESS
[de-identified] : Pawan is a 52 year old male who presents today for a post op visit.\par \par Post op Day: 7 weeks\par Surgery Type: Medial Partial Knee Arthroplasty\par Side of Surgery: Right\par Date of Surgery: 05.16.23\par \par Pain Level: 2\par Assistive Device: none\par Satisfaction Level: Very Satisfied\par \par Activities: Walking, home exercises, and PT. [de-identified] : Pawan is a 52 year old male who presents today for a post op visit. He states he is feeling well. He has been doing work around the house. He has been walking and did a 3 mile walk recently. He walked to our offfice today from [place].\par He gets a little sore after activity still. He is doing his PT. [de-identified] : Right Knee Exam: \par \par Incision: Healed medial parapatellar incision. \par \par Incision Pin Sites: Healed tibial pin site incisions. Healed femoral pin site incisions. \par \par Skin:  Healthy appearing. No erythema.  No ecchymosis.  No drainage. \par                \par Knee Joint Swelling: No Swelling.\par \par Alignment: Neutral.  \par                                                       \par ROM Extension: 0 degrees.   \par ROM Flexion: 120 degrees.   \par \par Medial Collateral Ligament Laxity:  Normal laxity. Good endpoint. \par \par Lateral Collateral Ligament Laxity: Normal laxity. Good endpoint. \par \par Instability: No flexion or extension instability\par \par Anterior Drawer Test: No significant translation. Good endpoint. \par Posterior Drawer Test:  Stable with good endpoint \par \par Motor Strength: 	\par Quadriceps strength is 5 out of 5 \par Hamstring strength is 5 out of 5 \par Ankle dorsiflexion strength is 5 out of 5 \par Ankle plantarflexion strength is 5 out of 5 \par \par Sensation:  \par Light tough sensation in the lower extremity is grossly normal.  \par Sensory change is located lateral to incision as expected. \par \par Pulses: \par Pulses are palpable at the ankle at the Dorsalis Pedis Artery.  \par Pulses are palpable at the Posterior Tibialis Artery. \par                                                                 \par Gait:  Normal gait.\par  \par Assistive Devices: None. [de-identified] : Post Op  X-Rays Partial Knee:\par \par Examination of the Knee: Right\par \par Views: AP, Lateral, Merchant, Hip to Ankle \par \par \par AP Standing View:\par Medial Partial Knee Replacement in good position.\par No signs of Loosening.\par No subsidence.\par No fracture.\par Good insert space thickness.\par \par Lateral View: \par Medial  Partial  Knee Replacement in good position.\par Fix is anatomic on both femur and tibia.\par \par Beemer View: \par Patellofemoral joint shows central tracking.\par Medial implant tracking is central on the tibial implant.\par \par Bilateral Hip to Ankle Standing View:\par Knee Alignment 0-1 degree of varus bilaterally [de-identified] : Assessment:\par \par - Post Op 6 weeks after Partial Knee Replacement, Medial compartment, [employer]-Assisted.\par - Gait is significantly improved from preop.\par - Walking with no significant pain up to 3 miles now.\par - Incisions healing well.\par - No signs of infection.\par - No calf tenderness. No signs of DVT.\par - Range of Motion is excellent at this early followup.\par - ROM is: 0-120\par - No problem with stairs or entry and exit from cars.\par - Can walk unlimited distance without support.\par - Stable collateral ligament exam. No Flexion instability.\par - X-rays today show implant in good position with no problems. \par - Patient is very satisfied with progress and outcome.\par \par \par Plan:\par \par 1. Continue to work on strengthening and gait\par 2. Follow-up 6 more weeks no X-rays

## 2023-07-10 ENCOUNTER — HOSPITAL ENCOUNTER (OUTPATIENT)
Dept: CT IMAGING | Facility: HOSPITAL | Age: 53
Discharge: HOME | End: 2023-07-10
Payer: COMMERCIAL

## 2023-07-10 DIAGNOSIS — R31.0 GROSS HEMATURIA: ICD-10-CM

## 2023-07-10 PROCEDURE — G1004 CDSM NDSC: HCPCS

## 2023-07-10 PROCEDURE — 74178 CT ABD&PLV WO CNTR FLWD CNTR: CPT

## 2023-07-10 RX ADMIN — IOHEXOL 100 ML: 350 INJECTION, SOLUTION INTRAVENOUS at 18:39

## 2023-07-11 DIAGNOSIS — E03.9 PRIMARY HYPOTHYROIDISM: ICD-10-CM

## 2023-07-11 RX ORDER — LEVOTHYROXINE SODIUM 100 MCG
TABLET ORAL
Qty: 90 TABLET | Refills: 0 | Status: SHIPPED | OUTPATIENT
Start: 2023-07-11

## 2023-08-16 ENCOUNTER — APPOINTMENT (OUTPATIENT)
Dept: ORTHOPEDIC SURGERY | Facility: CLINIC | Age: 53
End: 2023-08-16

## 2023-08-21 NOTE — HISTORY OF PRESENT ILLNESS
[de-identified] : Pawan is a 52 year old male who presents today 3 months s/p. He states he is feeling  Post op Day: 3 months Surgery Type: Medial Partial Knee Arthroplasty Side of Surgery: Right Date of Surgery: 05.16.23  Pain Level:  Assistive Device:  Satisfaction Level: Very Satisfied  Activities: Walking

## 2023-08-21 NOTE — REASON FOR VISIT
[Follow-Up Visit] : a follow-up visit for [FreeTextEntry2] : right medial partial knee arthroplasty on 05.16.23

## 2023-08-28 ENCOUNTER — TELEPHONE (OUTPATIENT)
Age: 53
End: 2023-08-28

## 2023-08-28 NOTE — TELEPHONE ENCOUNTER
Pt called and stated he needs to r/s appt due to schedule conflict.  Please review for r/s cysto due to no availability    Pt call [phone]

## 2023-08-29 NOTE — TELEPHONE ENCOUNTER
DEBORA for pt with new apt date and time, 9/5 at 9:30am w/[facility] in [facility]. ...provided number and ask for him to call back to conf

## 2023-09-05 ENCOUNTER — PROCEDURE VISIT (OUTPATIENT)
Dept: UROLOGY | Facility: CLINIC | Age: 53
End: 2023-09-05
Payer: COMMERCIAL

## 2023-09-05 VITALS
HEIGHT: 68 IN | OXYGEN SATURATION: 98 % | WEIGHT: 203.6 LBS | BODY MASS INDEX: 30.86 KG/M2 | RESPIRATION RATE: 16 BRPM | SYSTOLIC BLOOD PRESSURE: 122 MMHG | DIASTOLIC BLOOD PRESSURE: 70 MMHG | HEART RATE: 85 BPM

## 2023-09-05 DIAGNOSIS — R97.20 ELEVATED PSA: ICD-10-CM

## 2023-09-05 DIAGNOSIS — R31.0 GROSS HEMATURIA: Primary | ICD-10-CM

## 2023-09-05 PROCEDURE — 52000 CYSTOURETHROSCOPY: CPT | Performed by: UROLOGY

## 2023-09-05 PROCEDURE — 99213 OFFICE O/P EST LOW 20 MIN: CPT | Performed by: UROLOGY

## 2023-09-05 RX ORDER — TESTOSTERONE CYPIONATE 200 MG/ML
INJECTION, SOLUTION INTRAMUSCULAR
COMMUNITY
Start: 2023-08-23

## 2023-09-05 RX ORDER — TADALAFIL 10 MG/1
TABLET ORAL
COMMUNITY
Start: 2023-08-02

## 2023-09-05 NOTE — Clinical Note
Please schedule pt for AP visit in November 2023 after PSA (at time of appt I made plan for just prn fu but then called him and said we should recheck a PSA in November and fu after and he is ok with that)

## 2023-09-05 NOTE — ASSESSMENT & PLAN NOTE
The patient has a history of chronically mildly elevated PSA with negative biopsy in 2019 and MRI in 2022 which overall was unremarkable. PSA is a little bit higher this May than it was a year prior. He is scheduled for repeat PSA in a few months. If it continues to rise would consider repeating MRI and/or repeating an office biopsy.

## 2023-09-05 NOTE — ASSESSMENT & PLAN NOTE
Cytologies been sent today but otherwise negative hematuria work-up. Recommend annual urinalysis with PCP and consideration for repeat workup in 3-5 years if he has persistent microhematuria persists. If the patient has persistent gross hematuria then they should see us immediately.

## 2023-09-05 NOTE — PROGRESS NOTES
Assessment/Plan:    Gross hematuria  Cytologies been sent today but otherwise negative hematuria work-up. Recommend annual urinalysis with PCP and consideration for repeat workup in 3-5 years if he has persistent microhematuria persists. If the patient has persistent gross hematuria then they should see us immediately. Elevated PSA  The patient has a history of chronically mildly elevated PSA with negative biopsy in 2019 and MRI in 2022 which overall was unremarkable. PSA is a little bit higher this May than it was a year prior. He is scheduled for repeat PSA in a few months. If it continues to rise would consider repeating MRI and/or repeating an office biopsy. Subjective:      Patient ID: Bhakti Templeton is a 52 y.o. male. HPI    Bhakti Templeton is a 45 y.o. male here with history of voiding symptoms as well as an episode of gross hematuria. Patient reports recently going on a cruise and developing gross hematuria while on his cruise a few months ago. He denies pain or discomfort then or now. He denies recurrent hematuria. Patient originally told he he had increasing urinary frequency and urgency but today says those sx have resolved. CT IVP July 2023 was unremarkable. Cystoscopy today was unremarkable. Urine was sent for cytology    Patient with a history of elevated PSA status post prostate biopsy 7/2019. He had an MRI in July 2022 which showed a PI-RADS 2, volume 46 mL. His most recent PSA is 5.5 in May 2023 up from 4.7 in February 2022.       Past Surgical History:   Procedure Laterality Date   • APPENDECTOMY  1985   • ARTHROSCOPY KNEE Right 6/30/2016    Procedure: ARTHROSCOPY KNEE, MEDIAL MENISCECTOMY;  Surgeon: Ga Ortiz MD;  Location: [facility] OR;  Service:    • INJECTION ANESTHETIC AGENT TO CERVICAL PLEXUS     • KNEE ARTHROSCOPY Right 1990   • RI COLONOSCOPY FLX DX W/COLLTERESITA SPEC WHEN PFRMD N/A 4/22/2019    Procedure: COLONOSCOPY;  Surgeon: Dian Zeng MD; [facility]; Service: Gastroenterology   • NE EXCISION TUMOR SOFT TIS BACK/FLANK SUBQ 3 CM/> N/A 10/6/2022    Procedure: EXCISION  BIOPSY LESION/MASS BACK;  Surgeon: Thien Amador DO;  [facility];  Service: General        Past Medical History:   Diagnosis Date   • Colon polyp    • Disease of thyroid gland    • Hypothyroid    • Pollen allergies    • PTSD (post-traumatic stress disorder)    • Sleep apnea              Review of Systems   Constitutional: Negative for chills and fever. HENT: Negative for ear pain and sore throat. Eyes: Negative for pain and visual disturbance. Respiratory: Negative for cough and shortness of breath. Cardiovascular: Negative for chest pain and palpitations. Gastrointestinal: Negative for abdominal pain and vomiting. Genitourinary: Negative for dysuria and hematuria. Musculoskeletal: Negative for arthralgias and back pain. Skin: Negative for color change and rash. Neurological: Negative for seizures and syncope. All other systems reviewed and are negative. Objective:      /70 (BP Location: Left arm, Patient Position: Sitting, Cuff Size: Large)   Pulse 85   Resp 16   Ht 5' 8" (1.727 m)   Wt 92.4 kg (203 lb 9.6 oz)   SpO2 98%   BMI 30.96 kg/m²     Lab Results   Component Value Date    PSA 5.5 (H) 05/11/2023    PSA 4.7 (H) 02/01/2022    PSA 4.0 01/03/2020            Physical Exam  Vitals reviewed. Constitutional:       Appearance: Normal appearance. He is normal weight. HENT:      Head: Normocephalic and atraumatic. Eyes:      Pupils: Pupils are equal, round, and reactive to light. Abdominal:      General: Abdomen is flat. Neurological:      General: No focal deficit present. Mental Status: He is alert and oriented to person, place, and time. Psychiatric:         Mood and Affect: Mood normal.         Thought Content:  Thought content normal.              Cystoscopy     Date/Time 9/5/2023 9:30 AM Performed by  Manisha Davenport MD   Authorized by Manisha Davenport MD     Universal Protocol:  Consent: Written consent obtained. Risks and benefits: risks, benefits and alternatives were discussed  Consent given by: patient  Time out: Immediately prior to procedure a "time out" was called to verify the correct patient, procedure, equipment, support staff and site/side marked as required. Patient understanding: patient states understanding of the procedure being performed  Patient consent: the patient's understanding of the procedure matches consent given  Procedure consent: procedure consent matches procedure scheduled  Patient identity confirmed: verbally with patient        Procedure Details:  Procedure type: cystoscopy    Patient tolerance: Patient tolerated the procedure well with no immediate complications    Additional Procedure Details: A time-out was performed identifying the correct patient site and procedure. A MA chaperone was in the room. A flexible cystoscope was introduced into the urethra. The pendulous urethra was normal.  The prostatic urethra showed mild bilateral lobar hypertrophy without a median lobe. The bladder did not have any lesions concerning for malignancy. There were mild trabeculations and no diverticula. The ureteral orifices were in orthotopic position. CT ABDOMEN AND PELVIS WITH AND WITHOUT IV CONTRAST     INDICATION:   R31.0: Gross hematuria.     COMPARISON:  None.     TECHNIQUE: Initial CT of the abdomen and pelvis was performed without intravenous contrast.  Subsequent dynamic CT evaluation of the abdomen and pelvis was performed after the administration of intravenous contrast in both nephrographic and delayed   phases.  Multiplanar 2D reformatted images were created from the source data.     This examination, like all CT scans performed in the [facility], was performed utilizing techniques to minimize radiation dose exposure, including the use of iterative reconstruction and automated exposure control. Radiation dose length   product (DLP) for this visit:  1574 mGy-cm     IV Contrast:  100 mL of iohexol (OMNIPAQUE)  Enteric Contrast:  Enteric contrast was not administered.     FINDINGS:     ABDOMEN     RIGHT KIDNEY AND URETER:  No solid renal mass. No detectable urothelial mass. No hydronephrosis or hydroureter. No urinary tract calculi. No perinephric collection.     LEFT KIDNEY AND URETER:  No solid renal mass. No detectable urothelial mass. Scattered small cysts and lesions which are too small to characterize but statistically likely to represent cysts. No hydronephrosis or hydroureter. No urinary tract calculi. No perinephric collection.     URINARY BLADDER:  No bladder wall mass. No calculi.        LOWER CHEST:  No clinically significant abnormality identified in the visualized lower chest.     LIVER/BILIARY TREE:  Unremarkable.     GALLBLADDER:  No calcified gallstones. No pericholecystic inflammatory change.     SPLEEN:  Unremarkable.     PANCREAS: Small amount of fat interspersed in the pancreatic head versus a tiny cyst (series 3, image 57 and series 602, image 78). No contrast enhancement. No solid lesion. No pancreatic ductal dilatation.     ADRENAL GLANDS:  Unremarkable.     STOMACH AND BOWEL:  Unremarkable.     APPENDIX:  No findings to suggest appendicitis.     ABDOMINOPELVIC CAVITY:  No ascites. No free intraperitoneal air. No lymphadenopathy.     VESSELS:  Unremarkable for patient's age.     PELVIS     REPRODUCTIVE ORGANS: Mild prostatic enlargement.     ABDOMINAL WALL/INGUINAL REGIONS:  Unremarkable.     OSSEOUS STRUCTURES:  No acute fracture or destructive osseous lesion.     IMPRESSION:     Mild prostatic enlargement. No additional findings to explain the patient's hematuria.     Orders  Orders Placed This Encounter   Procedures   • Cystoscopy     This order was created via procedure documentation

## 2023-09-08 LAB
COUNSELING NOTE: NORMAL
CYTOLOGIST CVX/VAG CYTO: NORMAL
DX ICD CODE: NORMAL
PATH REPORT.FINAL DX SPEC: NORMAL
PATH REPORT.GROSS SPEC: NORMAL
PATH REPORT.SITE OF ORIGIN SPEC: NORMAL
PATHOLOGIST NAME: NORMAL

## 2023-10-12 DIAGNOSIS — E03.9 PRIMARY HYPOTHYROIDISM: ICD-10-CM

## 2023-10-12 RX ORDER — LEVOTHYROXINE SODIUM 100 MCG
TABLET ORAL
Qty: 90 TABLET | Refills: 1 | Status: SHIPPED | OUTPATIENT
Start: 2023-10-12

## 2023-12-04 ENCOUNTER — APPOINTMENT (OUTPATIENT)
Dept: ORTHOPEDIC SURGERY | Facility: CLINIC | Age: 53
End: 2023-12-04
Payer: COMMERCIAL

## 2023-12-04 ENCOUNTER — OUTPATIENT (OUTPATIENT)
Dept: OUTPATIENT SERVICES | Facility: HOSPITAL | Age: 53
LOS: 1 days | End: 2023-12-04
Payer: COMMERCIAL

## 2023-12-04 VITALS
RESPIRATION RATE: 18 BRPM | SYSTOLIC BLOOD PRESSURE: 147 MMHG | HEART RATE: 55 BPM | HEIGHT: 69 IN | DIASTOLIC BLOOD PRESSURE: 92 MMHG | WEIGHT: 193 LBS | OXYGEN SATURATION: 97 % | TEMPERATURE: 98.6 F | BODY MASS INDEX: 28.58 KG/M2

## 2023-12-04 DIAGNOSIS — Z96.651 PRESENCE OF RIGHT ARTIFICIAL KNEE JOINT: ICD-10-CM

## 2023-12-04 DIAGNOSIS — Z47.1 AFTERCARE FOLLOWING JOINT REPLACEMENT SURGERY: ICD-10-CM

## 2023-12-04 DIAGNOSIS — Z96.651 AFTERCARE FOLLOWING JOINT REPLACEMENT SURGERY: ICD-10-CM

## 2023-12-04 PROCEDURE — 73562 X-RAY EXAM OF KNEE 3: CPT

## 2023-12-04 PROCEDURE — 99213 OFFICE O/P EST LOW 20 MIN: CPT

## 2023-12-04 PROCEDURE — 77073 BONE LENGTH STUDIES: CPT

## 2023-12-04 PROCEDURE — 73562 X-RAY EXAM OF KNEE 3: CPT | Mod: 26,RT

## 2023-12-04 PROCEDURE — 77073 BONE LENGTH STUDIES: CPT | Mod: 26

## 2024-02-19 ENCOUNTER — TELEPHONE (OUTPATIENT)
Age: 54
End: 2024-02-19

## 2024-02-19 NOTE — TELEPHONE ENCOUNTER
Patient called to schedule a follow up due to elevated PSA.    Pt states that he is going to fax over the results, he does not know what the results are.    Pt is scheduled for next avail in [facility] office on 7/31 at 2:20 PM with AP.     Pt is on the wait list.    Pt asks that results be reviewed and if he needs to be seen sooner, to please call him.    Call back [phone]

## 2024-02-27 NOTE — TELEPHONE ENCOUNTER
Left message per communication form advising patient the office still has not received the fax of his PSA results. Fax number and office number provided.

## 2024-02-29 NOTE — TELEPHONE ENCOUNTER
Pt came into the office with his labs.    They have been scanned under the 2-19-24 Telephone Encounter.

## 2024-04-09 DIAGNOSIS — E03.9 PRIMARY HYPOTHYROIDISM: ICD-10-CM

## 2024-04-09 RX ORDER — LEVOTHYROXINE SODIUM 100 MCG
TABLET ORAL
Qty: 90 TABLET | Refills: 1 | Status: SHIPPED | OUTPATIENT
Start: 2024-04-09

## 2024-05-03 NOTE — REASON FOR VISIT
[Follow-Up Visit] : a follow-up visit for [FreeTextEntry2] : 1 year s/p right medial PKA on 5.16.23 at [facility]

## 2024-05-03 NOTE — HISTORY OF PRESENT ILLNESS
[de-identified] : Pawan is a 53 year old male who presents today 1 year s/p right medial PKA on 5.16.23 at .

## 2024-05-06 ENCOUNTER — APPOINTMENT (OUTPATIENT)
Dept: ORTHOPEDIC SURGERY | Facility: CLINIC | Age: 54
End: 2024-05-06

## 2024-05-08 ENCOUNTER — OFFICE VISIT (OUTPATIENT)
Dept: UROLOGY | Facility: AMBULATORY SURGERY CENTER | Age: 54
End: 2024-05-08
Payer: COMMERCIAL

## 2024-05-08 VITALS
HEIGHT: 68 IN | HEART RATE: 89 BPM | BODY MASS INDEX: 30.77 KG/M2 | OXYGEN SATURATION: 97 % | DIASTOLIC BLOOD PRESSURE: 80 MMHG | RESPIRATION RATE: 19 BRPM | SYSTOLIC BLOOD PRESSURE: 110 MMHG | WEIGHT: 203 LBS

## 2024-05-08 DIAGNOSIS — R97.20 ELEVATED PSA: Primary | ICD-10-CM

## 2024-05-08 DIAGNOSIS — R79.89 LOW TESTOSTERONE: ICD-10-CM

## 2024-05-08 PROCEDURE — 99214 OFFICE O/P EST MOD 30 MIN: CPT

## 2024-05-08 RX ORDER — ALBUTEROL SULFATE 90 UG/1
2 AEROSOL, METERED RESPIRATORY (INHALATION) EVERY 4 HOURS PRN
COMMUNITY
Start: 2024-03-28 | End: 2025-03-28

## 2024-05-08 RX ORDER — OMEPRAZOLE 40 MG/1
40 CAPSULE, DELAYED RELEASE ORAL DAILY
COMMUNITY
Start: 2024-03-28 | End: 2025-03-28

## 2024-05-08 RX ORDER — AZELASTINE HYDROCHLORIDE, FLUTICASONE PROPIONATE 137; 50 UG/1; UG/1
1 SPRAY, METERED NASAL 2 TIMES DAILY
COMMUNITY
Start: 2024-03-28

## 2024-05-08 RX ORDER — TADALAFIL 5 MG/1
5 TABLET ORAL DAILY PRN
Qty: 30 TABLET | Refills: 1 | Status: SHIPPED | OUTPATIENT
Start: 2024-05-08

## 2024-05-08 NOTE — PROGRESS NOTES
Office Visit- Urology  Travis Badillo 1970 MRN: [id number]      Assessment/Discussion/Plan    53 y.o. male managed by     Gross hematuria  -S/p negative workup in 2023    2. Elevated PSA  -Last PSA returned at 4.65 on 3/28/2024 from 5.5 in May 2023 and 4.7 in February 2022  -S/p negative prostate biopsy in 2019  -Multiparametric MRI of the prostate July 2022 which returned PI-RADS 2 no dominant lesion and calculated prostate volume of 46 mL  -NYLA today with no palpable abnormality concerning for prostate cancer  -Continue to monitor PSA with recheck PSA in 6 months    3.  Low testosterone  -Patient is prescribed testosterone placement therapy by outside provider and was transition his testosterone management to [facility]  -Will obtain updated testosterone level and CBC as patient states that he is not currently utilizing testosterone  -Informed patient of the risk of testosterone placement therapy including polycythemia vera, independent risk of DVT/PE, potential cardiovascular risk, possible progression escalation of  occult prostate cancer, worsening urinary symptoms/worsening BPH or infertility.  Patient expressed understanding.  -Will call patient with results    4.  Suspected Peyronie's disease  -Patient notes increased curvature of penis  -Plaque felt on examination today in the midline of the dorsum of the middle of the penile shaft    5.  Erectile dysfunction  -Cialis 5 mg sent to pharmacy      Chief Complaint:   Travis is a 53 y.o. male presenting to the office for a follow up visit regarding          Subjective    Patient is a 53-year-old male with a history of BPH with lower tract symptoms, elevated PSA, gross hematuria s/p negative workup who presents her follow-up.  Was last in the office for a cystoscopy in September 2023 performed by Dr. Cage that was negative.  He presents to the office today for multiple concerns.  He states that he receives testosterone placement therapy at outside practice  but is looking to transfer testosterone replacement therapy care to [facility] urology.  He states that he was diagnosed with a low testosterone though he does not know his initial numbers.  He does not know what current dose of testosterone that he  was utilizing but states that he is no longer utilizing it as he was concerned about possible effect on kidney function.  He also utilizes Cialis for erectile dysfunction stating that he uses the daily dose (presumably 5 mg).   patient has had a longstanding history of mildly elevated PSA and he is s/p negative prostate biopsy in 2019.  He also had a multiparametric MRI prostate July 2022 that returned PI-RADS 2 no dominant lesion and a calculated prostate volume of 46 g.  His last PSA returned at 4.65 on 3/28 since thousand 24 from 5.5 in May 2023 and 4.7 October 2022.  Patient notes that he has been having increased curvature of his penis to the left and upwards.  He states that he feels that he may have Peyronie's disease      ROS:   Review of Systems   Constitutional: Negative.  Negative for chills, fatigue and fever.   HENT: Negative.     Respiratory:  Negative for shortness of breath.    Cardiovascular:  Negative for chest pain.   Gastrointestinal: Negative.  Negative for abdominal pain.   Endocrine: Negative.    Musculoskeletal: Negative.    Skin: Negative.    Neurological: Negative.  Negative for dizziness and light-headedness.   Hematological: Negative.    Psychiatric/Behavioral: Negative.           Past Medical History  Past Medical History:   Diagnosis Date    Colon polyp     Disease of thyroid gland     Hypothyroid     Pollen allergies     PTSD (post-traumatic stress disorder)     Sleep apnea        Past Surgical History  Past Surgical History:   Procedure Laterality Date    APPENDECTOMY  1985    ARTHROSCOPY KNEE Right 6/30/2016    Procedure: ARTHROSCOPY KNEE, MEDIAL MENISCECTOMY;  Surgeon: aDvid Chawla MD;  Location: [facility] MAIN OR;  Service:      INJECTION ANESTHETIC AGENT TO CERVICAL PLEXUS      KNEE ARTHROSCOPY Right 1990    UT COLONOSCOPY FLX DX W/COLLJ SPEC WHEN PFRMD N/A 4/22/2019    Procedure: COLONOSCOPY;  Surgeon: Davion Rai MD;  [facility];  Service: Gastroenterology    UT EXCISION TUMOR SOFT TIS BACK/FLANK SUBQ 3 CM/> N/A 10/6/2022    Procedure: EXCISION  BIOPSY LESION/MASS BACK;  Surgeon: Jef Amador DO;  [facility];  Service: General       Past Family History  Family History   Problem Relation Age of Onset    Breast cancer Mother     Cancer Sister         3 sisters thyroid CA    No Known Problems Father     Hashimoto's thyroiditis Sister     Melanoma Maternal Aunt        Past Social history  Social History     Socioeconomic History    Marital status: /Civil Union     Spouse name: Not on file    Number of children: Not on file    Years of education: Not on file    Highest education level: Not on file   Occupational History    Not on file   Tobacco Use    Smoking status: Never     Passive exposure: Never    Smokeless tobacco: Never   Vaping Use    Vaping status: Never Used   Substance and Sexual Activity    Alcohol use: Yes     Alcohol/week: 8.0 standard drinks of alcohol     Types: 8 Cans of beer per week     Comment: 1 per week    Drug use: Yes     Frequency: 4.0 times per week     Types: Marijuana    Sexual activity: Not on file   Other Topics Concern    Not on file   Social History Narrative    Not on file     Social Determinants of Health     Financial Resource Strain: Not on file   Food Insecurity: Not on file   Transportation Needs: Not on file   Physical Activity: Not on file   Stress: Not on file   Social Connections: Not on file   Intimate Partner Violence: Not on file   Housing Stability: Not on file       Current Medications  Current Outpatient Medications   Medication Sig Dispense Refill    albuterol (PROVENTIL HFA,VENTOLIN HFA) 90 mcg/act inhaler Inhale 2 puffs every 4 (four) hours as  "needed      Azelastine-Fluticasone 137-50 MCG/ACT SUSP 1 spray into each nostril 2 (two) times a day      fluticasone (FLONASE) 50 mcg/act nasal spray 1 spray into each nostril as needed for rhinitis      omeprazole (PriLOSEC) 40 MG capsule Take 40 mg by mouth daily      Synthroid 100 MCG tablet take 1 tablet by mouth once daily 90 tablet 1    tadalafil (CIALIS) 10 MG tablet TAKE ONE TABLET BY MOUTH DAILY 30 MINUTES BEFORE SEXUAL ACTIVITY      testosterone cypionate (DEPO-TESTOSTERONE) 200 mg/mL SOLN INJECT 0.5ML INTRAMUSCULARLY THE BUTTOCK 1X PER WEEK       No current facility-administered medications for this visit.       Allergies  Allergies   Allergen Reactions    Amoxicillin-Pot Clavulanate Rash    Penicillins Anxiety, Hives, Other (See Comments), GI Intolerance, Photosensitivity and Rash       OBJECTIVE    Vitals   Vitals:    05/08/24 1528   BP: 110/80   BP Location: Left arm   Patient Position: Sitting   Cuff Size: Adult   Pulse: 89   Resp: 19   SpO2: 97%   Weight: 92.1 kg (203 lb)   Height: 5' 8\" (1.727 m)       PVR:    Physical Exam  Constitutional:       General: He is not in acute distress.     Appearance: Normal appearance. He is normal weight. He is not ill-appearing or toxic-appearing.   HENT:      Head: Normocephalic and atraumatic.   Eyes:      Conjunctiva/sclera: Conjunctivae normal.   Cardiovascular:      Rate and Rhythm: Normal rate.   Pulmonary:      Effort: Pulmonary effort is normal. No respiratory distress.   Genitourinary:     Comments: On prostate exam today prostate feels enlarged but no overt nodularity or asymmetry.    On penile exam there is appreciation of suspected Peyronie's plaque in the dorsum of the penis at midshaft and medial aspect.  No other abnormality appreciated  Skin:     General: Skin is warm and dry.   Neurological:      General: No focal deficit present.      Mental Status: He is alert and oriented to person, place, and time.      Cranial Nerves: No cranial nerve " "deficit.   Psychiatric:         Mood and Affect: Mood normal.         Behavior: Behavior normal.         Thought Content: Thought content normal.          Labs:     Lab Results   Component Value Date    PSA 5.5 (H) 05/11/2023    PSA 4.7 (H) 02/01/2022    PSA 4.0 01/03/2020     Lab Results   Component Value Date    CREATININE 1.39 (H) 05/11/2023      No results found for: \"HGBA1C\"  Lab Results   Component Value Date    GLUCOSE 92 09/02/2016    CALCIUM 9.3 09/21/2022     09/02/2016    K 5.2 05/11/2023    CO2 23 05/11/2023     05/11/2023    BUN 25 (H) 05/11/2023    CREATININE 1.39 (H) 05/11/2023       I have personally reviewed all pertinent lab results and reviewed with patient    Imaging       Hipolito Chawla PA-C  Date: 5/8/2024 Time: 3:44 PM  [facility]    This note was written using fluency dictation software. Please excuse any resulting minor grammatical errors.      "

## 2024-05-08 NOTE — Clinical Note
He Dr. Moreland,   Hi Dr. Moreland,  Reaching out  to confirm with you that you do treat Peyronie's disease.  I heard from another practitioner that you see these patients but wanted to confirm with you.  If not I will refer patient to Dr. Stoll/emma  Thanks much, Hipolito

## 2024-05-15 ENCOUNTER — TELEPHONE (OUTPATIENT)
Dept: UROLOGY | Facility: AMBULATORY SURGERY CENTER | Age: 54
End: 2024-05-15

## 2024-05-15 ENCOUNTER — APPOINTMENT (OUTPATIENT)
Dept: LAB | Facility: CLINIC | Age: 54
End: 2024-05-15
Payer: COMMERCIAL

## 2024-05-15 DIAGNOSIS — R79.89 LOW TESTOSTERONE: Primary | ICD-10-CM

## 2024-05-15 DIAGNOSIS — R31.0 GROSS HEMATURIA: ICD-10-CM

## 2024-05-15 DIAGNOSIS — R79.89 LOW TESTOSTERONE: ICD-10-CM

## 2024-05-15 LAB
ANION GAP SERPL CALCULATED.3IONS-SCNC: 7 MMOL/L (ref 4–13)
BUN SERPL-MCNC: 24 MG/DL (ref 5–25)
CALCIUM SERPL-MCNC: 9.3 MG/DL (ref 8.4–10.2)
CHLORIDE SERPL-SCNC: 106 MMOL/L (ref 96–108)
CO2 SERPL-SCNC: 26 MMOL/L (ref 21–32)
CREAT SERPL-MCNC: 1.28 MG/DL (ref 0.6–1.3)
ERYTHROCYTE [DISTWIDTH] IN BLOOD BY AUTOMATED COUNT: 12 % (ref 11.6–15.1)
GFR SERPL CREATININE-BSD FRML MDRD: 62 ML/MIN/1.73SQ M
GLUCOSE P FAST SERPL-MCNC: 108 MG/DL (ref 65–99)
HCT VFR BLD AUTO: 43.1 % (ref 36.5–49.3)
HGB BLD-MCNC: 14.5 G/DL (ref 12–17)
MCH RBC QN AUTO: 32.9 PG (ref 26.8–34.3)
MCHC RBC AUTO-ENTMCNC: 33.6 G/DL (ref 31.4–37.4)
MCV RBC AUTO: 98 FL (ref 82–98)
PLATELET # BLD AUTO: 180 THOUSANDS/UL (ref 149–390)
PMV BLD AUTO: 12.5 FL (ref 8.9–12.7)
POTASSIUM SERPL-SCNC: 4.3 MMOL/L (ref 3.5–5.3)
RBC # BLD AUTO: 4.41 MILLION/UL (ref 3.88–5.62)
SODIUM SERPL-SCNC: 139 MMOL/L (ref 135–147)
TESTOST SERPL-MSCNC: 154 NG/DL
WBC # BLD AUTO: 4.21 THOUSAND/UL (ref 4.31–10.16)

## 2024-05-15 PROCEDURE — 36415 COLL VENOUS BLD VENIPUNCTURE: CPT

## 2024-05-15 PROCEDURE — 84403 ASSAY OF TOTAL TESTOSTERONE: CPT

## 2024-05-15 PROCEDURE — 80048 BASIC METABOLIC PNL TOTAL CA: CPT

## 2024-05-15 PROCEDURE — 85027 COMPLETE CBC AUTOMATED: CPT

## 2024-05-15 NOTE — TELEPHONE ENCOUNTER
Can you please call Travis and let him know that I did review his most recent testosterone level.  It was reported at 154 which is low.  I understand that he was taking testosterone replacements in the past.  We can plan to reinitiate that and I will [facility].  He should start with 100 mg every other week and plan to have a repeat testosterone and CBC drawn prior to his third injection.  I will place the orders for all of this and then he should plan to make a follow-up appointment in our office in 3 months to discuss symptoms and repeat labs.

## 2024-05-15 NOTE — TELEPHONE ENCOUNTER
Called Travis back and review testosterone level and that medication was sent to pharmacy - [facility]  Will mail him information

## 2024-05-16 ENCOUNTER — TELEPHONE (OUTPATIENT)
Age: 54
End: 2024-05-16

## 2024-05-16 RX ORDER — TESTOSTERONE CYPIONATE 200 MG/ML
100 INJECTION, SOLUTION INTRAMUSCULAR ONCE
Qty: 0.5 ML | Refills: 0 | Status: SHIPPED | OUTPATIENT
Start: 2024-05-16 | End: 2024-05-16

## 2024-05-31 DIAGNOSIS — R79.89 LOW TESTOSTERONE: ICD-10-CM

## 2024-05-31 RX ORDER — TESTOSTERONE CYPIONATE 200 MG/ML
100 INJECTION, SOLUTION INTRAMUSCULAR ONCE
Qty: 0.5 ML | Refills: 0 | Status: SHIPPED | OUTPATIENT
Start: 2024-05-31 | End: 2024-06-28 | Stop reason: SDUPTHER

## 2024-05-31 NOTE — TELEPHONE ENCOUNTER
Patient is calling today to request additional refill.    Pt states the syringes weren't ordered last time and is asking for them to be ordered this time around.    Pt would also like a returned call, to clarify how he should administer the testosterone.    Pt states that the provider's verbal instructions were to take the 1 ml every two weeks. And the Rx stated 0.5 ml.     Pt would also like it sent to a different Pharmacy if possible.    [facility] on [address]  Tel [phone]     Call back [phone]

## 2024-05-31 NOTE — TELEPHONE ENCOUNTER
Patient should administer 0.5 mL / 100 mg every other week.  2. He should inject this into either his arm, leg, or buttock to ensure that it is entering a muscle so that it can be adequately absorbed.  Please let him know that if he would like to come in for a nursing visit for instruction/demonstration we can plan to set this up.  3.  I called in syringes to his pharmacy, please let him know that we have been having some difficulty with certain pharmacies having syringes and if he runs into this problem he should call around to local pharmacies and see what syringes are available and similar to the one that I prescribed and we can adjust the order accordingly.

## 2024-06-28 ENCOUNTER — TELEPHONE (OUTPATIENT)
Age: 54
End: 2024-06-28

## 2024-06-28 DIAGNOSIS — R79.89 LOW TESTOSTERONE: ICD-10-CM

## 2024-06-28 DIAGNOSIS — R97.20 ELEVATED PSA: ICD-10-CM

## 2024-06-28 RX ORDER — TESTOSTERONE CYPIONATE 200 MG/ML
100 INJECTION, SOLUTION INTRAMUSCULAR ONCE
Qty: 0.5 ML | Refills: 0 | Status: SHIPPED | OUTPATIENT
Start: 2024-06-28 | End: 2024-06-28

## 2024-06-28 RX ORDER — TADALAFIL 5 MG/1
5 TABLET ORAL DAILY PRN
Qty: 30 TABLET | Refills: 1 | Status: SHIPPED | OUTPATIENT
Start: 2024-06-28

## 2024-06-28 NOTE — TELEPHONE ENCOUNTER
"Pt calling to request a refill for:      testosterone cypionate (DEPO-TESTOSTERONE) 200 mg/mL SOLN [[id number]]        Syringe/Needle, Disp, (SYRINGE 3CC/62ME0-5/2\") 25G X 1-1/2\" 3 ML MISC [[id number]]       tadalafil (CIALIS) 5 MG tablet [[id number]]     Pt has new preferred pharmacy that he would like prescriptions be sent to:     [facility] [address] [id number]  Phone: [phone]    Pt asking if smaller syringe could be called in for testosterone due to he sts it is too big for injections.    Please review.    Pt call back- [phone]  "

## 2024-07-12 DIAGNOSIS — R79.89 LOW TESTOSTERONE: ICD-10-CM

## 2024-07-12 NOTE — TELEPHONE ENCOUNTER
*NOT A DUPLICATE*  Pt states he has the inj every 2 weeks. His next inj is Sunday.    Reason for call:   [x] Refill   [] Prior Auth  [] Other:     Office:   [] PCP/Provider -   [x] Specialty/Provider - [facility] [place] / Hipolito Chawla PA-C     Medication:   testosterone cypionate (DEPO-TESTOSTERONE) 200 mg/mL SOLN - Inject 0.5 mL (100 mg total) into a muscle once for 1 dose     Pharmacy:   [id number] - [place] - [address]     Does the patient have enough for 3 days?   [] Yes   [x] No - Send as HP to POD

## 2024-07-15 NOTE — TELEPHONE ENCOUNTER
Patient calling in in regards to refill for testosterone. Patient made aware encounter was sent to provider pool. Please monitor for answer and call patient back.    CB: [phone]

## 2024-07-16 RX ORDER — TESTOSTERONE CYPIONATE 200 MG/ML
100 INJECTION, SOLUTION INTRAMUSCULAR ONCE
Qty: 0.5 ML | Refills: 0 | Status: SHIPPED | OUTPATIENT
Start: 2024-07-16 | End: 2024-07-16

## 2024-07-19 ENCOUNTER — APPOINTMENT (OUTPATIENT)
Dept: LAB | Facility: CLINIC | Age: 54
End: 2024-07-19
Payer: COMMERCIAL

## 2024-07-19 DIAGNOSIS — R79.89 LOW TESTOSTERONE: Primary | ICD-10-CM

## 2024-07-19 LAB
BASOPHILS # BLD AUTO: 0.03 THOUSANDS/ÂΜL (ref 0–0.1)
BASOPHILS NFR BLD AUTO: 1 % (ref 0–1)
EOSINOPHIL # BLD AUTO: 0.1 THOUSAND/ÂΜL (ref 0–0.61)
EOSINOPHIL NFR BLD AUTO: 2 % (ref 0–6)
ERYTHROCYTE [DISTWIDTH] IN BLOOD BY AUTOMATED COUNT: 13.5 % (ref 11.6–15.1)
HCT VFR BLD AUTO: 45.5 % (ref 36.5–49.3)
HGB BLD-MCNC: 15.2 G/DL (ref 12–17)
IMM GRANULOCYTES # BLD AUTO: 0.01 THOUSAND/UL (ref 0–0.2)
IMM GRANULOCYTES NFR BLD AUTO: 0 % (ref 0–2)
LYMPHOCYTES # BLD AUTO: 1.57 THOUSANDS/ÂΜL (ref 0.6–4.47)
LYMPHOCYTES NFR BLD AUTO: 30 % (ref 14–44)
MCH RBC QN AUTO: 33.4 PG (ref 26.8–34.3)
MCHC RBC AUTO-ENTMCNC: 33.4 G/DL (ref 31.4–37.4)
MCV RBC AUTO: 100 FL (ref 82–98)
MONOCYTES # BLD AUTO: 0.37 THOUSAND/ÂΜL (ref 0.17–1.22)
MONOCYTES NFR BLD AUTO: 7 % (ref 4–12)
NEUTROPHILS # BLD AUTO: 3.24 THOUSANDS/ÂΜL (ref 1.85–7.62)
NEUTS SEG NFR BLD AUTO: 60 % (ref 43–75)
NRBC BLD AUTO-RTO: 0 /100 WBCS
PLATELET # BLD AUTO: 203 THOUSANDS/UL (ref 149–390)
PMV BLD AUTO: 12.1 FL (ref 8.9–12.7)
PSA SERPL-MCNC: 4.2 NG/ML (ref 0–4)
RBC # BLD AUTO: 4.55 MILLION/UL (ref 3.88–5.62)
TESTOST SERPL-MSCNC: 188 NG/DL
WBC # BLD AUTO: 5.32 THOUSAND/UL (ref 4.31–10.16)

## 2024-07-19 PROCEDURE — 84153 ASSAY OF PSA TOTAL: CPT

## 2024-07-19 PROCEDURE — 85025 COMPLETE CBC W/AUTO DIFF WBC: CPT

## 2024-07-19 PROCEDURE — 84403 ASSAY OF TOTAL TESTOSTERONE: CPT

## 2024-07-19 PROCEDURE — 36415 COLL VENOUS BLD VENIPUNCTURE: CPT

## 2024-07-29 ENCOUNTER — TELEPHONE (OUTPATIENT)
Dept: UROLOGY | Facility: CLINIC | Age: 54
End: 2024-07-29

## 2024-07-29 ENCOUNTER — OFFICE VISIT (OUTPATIENT)
Dept: UROLOGY | Facility: CLINIC | Age: 54
End: 2024-07-29
Payer: COMMERCIAL

## 2024-07-29 VITALS
DIASTOLIC BLOOD PRESSURE: 82 MMHG | BODY MASS INDEX: 30.16 KG/M2 | WEIGHT: 199 LBS | SYSTOLIC BLOOD PRESSURE: 126 MMHG | HEIGHT: 68 IN | OXYGEN SATURATION: 98 % | HEART RATE: 61 BPM

## 2024-07-29 DIAGNOSIS — N48.6 PEYRONIE'S DISEASE: Primary | ICD-10-CM

## 2024-07-29 DIAGNOSIS — R97.20 ELEVATED PSA: ICD-10-CM

## 2024-07-29 DIAGNOSIS — N52.9 ERECTILE DYSFUNCTION, UNSPECIFIED ERECTILE DYSFUNCTION TYPE: ICD-10-CM

## 2024-07-29 DIAGNOSIS — R79.89 LOW TESTOSTERONE: ICD-10-CM

## 2024-07-29 LAB
POST-VOID RESIDUAL VOLUME, ML POC: 0 ML
SL AMB  POCT GLUCOSE, UA: NORMAL
SL AMB LEUKOCYTE ESTERASE,UA: NORMAL
SL AMB POCT BILIRUBIN,UA: NORMAL
SL AMB POCT BLOOD,UA: NORMAL
SL AMB POCT CLARITY,UA: CLEAR
SL AMB POCT COLOR,UA: YELLOW
SL AMB POCT KETONES,UA: NORMAL
SL AMB POCT NITRITE,UA: NORMAL
SL AMB POCT PH,UA: 6
SL AMB POCT SPECIFIC GRAVITY,UA: 1.02
SL AMB POCT URINE PROTEIN: NORMAL
SL AMB POCT UROBILINOGEN: 0.2

## 2024-07-29 PROCEDURE — 81002 URINALYSIS NONAUTO W/O SCOPE: CPT | Performed by: UROLOGY

## 2024-07-29 PROCEDURE — 51798 US URINE CAPACITY MEASURE: CPT | Performed by: UROLOGY

## 2024-07-29 PROCEDURE — 99215 OFFICE O/P EST HI 40 MIN: CPT | Performed by: UROLOGY

## 2024-07-29 NOTE — TELEPHONE ENCOUNTER
Please advise on an appointment in 1-2 months with Dr. Moreland for test erection per his check out orders. Thank you!          Return for tara 1-2 months for test erection.

## 2024-07-29 NOTE — PROGRESS NOTES
UROLOGY FOLLOW-UP ENCOUNTER    Travis Badillo is a 53 y.o. male with ePSA, low testosterone    Anticoagulation: none    Negative prostate biopsy in 2019    Multiparametric MRI of the prostate July 2022 which returned PI-RADS 2 no dominant lesion and calculated prostate volume of 46 mL     CT renal protocol 7/10/23: mild prostatic enlargement; otherwise negative    On Cialis 5 mg PRN sex since around 2023    Prev had outside provider manage TRT    Labs 7/19/24: PSA 4.198, total T, Hb 15.2, Hct 45.5    Restarted on TRT by [facility] uro in June 2024    U dip neg on 7/29/24    PVR 0 cc on 7/29/24      Penile curvature history:  -Penile curvature present since birth? No  -Date penile curvature was first noticed: around mid 2023  -Penile curvature painful during erections? Never  -Direction of penile curvature: left  -Patient estimation of degree of penile curvature: about 45 degrees  -Does patient have penile plaque present? If yes, where?: Left shaft  -Does patient require medication to achieve erection? Yes  --Medication: Cialis 5 mg  -Does the penile curvature make intercourse difficult or painful for patient: No  -Does the penile curvature make intercourse difficult or painful for the partner: No  -Previous therapies attempted for penile curvature (medical or surgical): none    About 2 cm left dorsal plaque at mid shaft on exam 7/29/24      Assessment and plan:     Peyronie's disease    We had an extensive discussion in the office today regarding Peyronie's disease.  I then explained to patient that treatment for Peyronie's disease is not emergent and is purely elective and designed to beneficially impact quality of life.    We reviewed proposed pathophysiology of the disease.  I explained that the disease is considered to have 2 separate phases, the acute phase and the stable phase.  I explained that during the acute phase, erections are generally painful and the degree of curvature gradually increases over time.  I  explained that the acute phase generally lasts about 6 to 9 months.  I explained that by the time erections are no longer painful and the degree of curvature has stabilized, the patient is considered to be in the stable phase.    I explained that while patients are in the acute phase, it is generally recommended that patients do not undergo correction of the disease.  I explained that penile straightening treatments are most often reserved for the stable phase of disease.  I explained that in the setting of active phase, patients are recommended to trial NSAIDs for ongoing pain.    I then explained that once patient enters stable phase, there are other treatment options available. The choice of treatments often comes down to the degree of curvature, location of curvature, and the degree of bother for the patient.    I therefore explained that per AUA guidelines, Peyronie's patients should have in-office test erections with intracavernosal injection. I reviewed the process of office test erection. I explained that in these cases, the provider will order a small supply of Trimix injection medication for the patient. The medication will be sent from the compound pharmacy to the patient's address. The patient then brings the medication and injection supplies to his office appointment. I then explained that on the follow-up appointment, the patient is injected by the provider in order to induce erection. Once erection is present, measurements are taken regarding location of curvature and degree of curvature.    I explained that per AUA guidelines, patients with penile curvature between 30 and 90 degrees and intact erectile function (with or without medications), patients may consider intralesional injection of collagenase clostridium histolyticum (commonly known by trade name, Xiaflex) in combination with modeling (explained that this is when the penis is bent in the direction opposite of the curvature at the site of the  plaque in hopes of making the penis more straight.    I explained that intralesional injection of collagenase clostridium histolyticum did have some risks including but not limited to: penile ecchymosis, swelling, pain, and corporal rupture.    I also explained that alternative injection treatments include intralesional interferon alpha-2b (side effects sinusitis, flu-like symptoms, minor penile swelling) and intralesional verapamil (side effects including penile bruising, dizziness, nausea, pain at injection site).    I explained that patients often inquire about extracorporeal shock wave therapy for Peyronie's disease, but studies have only shown that this potentially helps improve Peyronie's-associated pain rather than correct any curvature.    We then discussed the possible surgical options for correction of Peyronie's disease. I explained that surgical correct could only be considered in patients with stable disease.    I explained that Peyronie's patients with good erectile function (with or without medications) desiring surgical correction could undergo tunical plication or plaque incision/excision/grafting. We discussed the general steps of these aforementioned procedures.    I then explained that Peyronie's patients with poor erectile function despite use of medications who wanted to pursue surgical correction may be offered inflatable penile prosthesis surgery. I explained that often times after penile implant is placed and inflated the curvature could go away or be significantly decreased. For patients needing additional straightening even after device is implanted, intraoperative adjunctive procedures such as modeling, plication, or incision/grafting could be performed. We did briefly discuss what these procedures entailed.         He would like to come back to the office for test direction.  Trimix was ordered to the compound pharmacy.  He will bring this to his next appointment for test erection.  We  "will determine the degree of curvature and we will go over possible treatment options at that time.      Low testosterone    Already started on testosterone cyprionate. Tolerating.    Elevated PSA    Labs 7/19/24: PSA 4.198    Next PSA will be end of 2024    ED    Happy on Cialis 5 mg PRN    Will continue          PLAN  -Next PSA at end of 2024  -Cont 5 mg Cialis PRN  -Will come back to office in a few months for test erection. Prescription for Trimix through compound pharmacy sent. Side effects reviewed.        Portions of the above record have been created with voice recognition software.  Occasional wrong word or \"sound alike\" substitution may have occurred due to the inherent limitations of voice recognition software.  Read the chart carefully and recognize, using context, where substitution may have occurred.      Sanket Moreland,         Chief Complaint     Peyronie's disease    History of Present Illness     See summary above    No fevers or chills        The following portions of the patient's history were reviewed and updated as appropriate: allergies, current medications, past family history, past medical history, past social history, past surgical history and problem list.        AUA SYMPTOM SCORE      Flowsheet Row Most Recent Value   AUA SYMPTOM SCORE    How often have you had a sensation of not emptying your bladder completely after you finished urinating? 0 (P)     How often have you had to urinate again less than two hours after you finished urinating? 3 (P)     How often have you found you stopped and started again several times when you urinate? 0 (P)     How often have you found it difficult to postpone urination? 1 (P)     How often have you had a weak urinary stream? 0 (P)     How often have you had to push or strain to begin urination? 0 (P)     How many times did you most typically get up to urinate from the time you went to bed at night until the time you got up in the morning? 2 (P)  " "   Quality of Life: If you were to spend the rest of your life with your urinary condition just the way it is now, how would you feel about that? 2 (P)     AUA SYMPTOM SCORE 6 (P)              Review of Systems     Review of Systems   Constitutional:  Negative for chills and fever.   Respiratory:  Negative for cough and shortness of breath.    Genitourinary:  Negative for dysuria and hematuria.   Neurological:  Negative for dizziness and headaches.   Psychiatric/Behavioral:  Negative for agitation and behavioral problems.        Allergies     Allergies   Allergen Reactions    Amoxicillin-Pot Clavulanate Rash    Penicillins Anxiety, Hives, Other (See Comments), GI Intolerance, Photosensitivity and Rash       Physical Exam     Physical Exam  Constitutional:       General: He is not in acute distress.  HENT:      Head: Normocephalic and atraumatic.   Pulmonary:      Effort: Pulmonary effort is normal. No respiratory distress.   Abdominal:      General: Abdomen is flat.      Palpations: Abdomen is soft.      Tenderness: There is no right CVA tenderness or left CVA tenderness.   Genitourinary:     Comments: Circumcised   About 2 cm left dorsal plaque at mid shaft  BL testis soft and nontender  Skin:     General: Skin is warm and dry.   Neurological:      General: No focal deficit present.      Mental Status: He is alert and oriented to person, place, and time.   Psychiatric:         Mood and Affect: Mood normal.         Behavior: Behavior normal.             Vital Signs  Vitals:    07/29/24 1046   BP: 126/82   Pulse: 61   SpO2: 98%   Weight: 90.3 kg (199 lb)   Height: 5' 8\" (1.727 m)         Current Medications       Current Outpatient Medications:     albuterol (PROVENTIL HFA,VENTOLIN HFA) 90 mcg/act inhaler, Inhale 2 puffs every 4 (four) hours as needed, Disp: , Rfl:     Azelastine-Fluticasone 137-50 MCG/ACT SUSP, 1 spray into each nostril 2 (two) times a day, Disp: , Rfl:     fluticasone (FLONASE) 50 mcg/act nasal " "spray, 1 spray into each nostril as needed for rhinitis, Disp: , Rfl:     omeprazole (PriLOSEC) 40 MG capsule, Take 40 mg by mouth daily, Disp: , Rfl:     Synthroid 100 MCG tablet, take 1 tablet by mouth once daily, Disp: 90 tablet, Rfl: 1    Syringe/Needle, Disp, (SYRINGE 3CC/67IN1-5/2\") 25G X 1-1/2\" 3 ML MISC, Use every 7 days, Disp: 5 each, Rfl: 2    tadalafil (CIALIS) 5 MG tablet, Take 1 tablet (5 mg total) by mouth daily as needed for erectile dysfunction, Disp: 30 tablet, Rfl: 1    testosterone cypionate (DEPO-TESTOSTERONE) 200 mg/mL SOLN, Inject 0.5 mL (100 mg total) into a muscle once for 1 dose, Disp: 0.5 mL, Rfl: 0    Active Problems     Patient Active Problem List   Diagnosis    Allergic rhinitis    Elevated liver enzymes    Primary hypothyroidism    Localized osteoarthritis of right knee    Organic impotence    Rosacea    Lipoma of torso    History of colon polyps    Arthritis of right knee    Gross hematuria    Elevated PSA       Past Medical History     Past Medical History:   Diagnosis Date    Colon polyp     Disease of thyroid gland     Hypothyroid     Pollen allergies     PTSD (post-traumatic stress disorder)     Sleep apnea     Testosterone deficiency 2015       Surgical History     Past Surgical History:   Procedure Laterality Date    APPENDECTOMY  1985    ARTHROSCOPY KNEE Right 6/30/2016    Procedure: ARTHROSCOPY KNEE, MEDIAL MENISCECTOMY;  Surgeon: David Chawla MD;  Location: [facility] MAIN OR;  Service:     INJECTION ANESTHETIC AGENT TO CERVICAL PLEXUS      KNEE ARTHROSCOPY Right 1990    AZ COLONOSCOPY FLX DX W/COLLJ SPEC WHEN PFRMD N/A 4/22/2019    Procedure: COLONOSCOPY;  Surgeon: Davion Rai MD;  [facility];  Service: Gastroenterology    AZ EXCISION TUMOR SOFT TIS BACK/FLANK SUBQ 3 CM/> N/A 10/6/2022    Procedure: EXCISION  BIOPSY LESION/MASS BACK;  Surgeon: Jef Amador DO;  Location: [facility] MAIN OR;  Service: General         Family History     Family History "   Problem Relation Age of Onset    Breast cancer Mother     Cancer Mother         Breast Cancer    Cancer Sister         3 sisters thyroid CA    No Known Problems Father     Hashimoto's thyroiditis Sister     Melanoma Maternal Aunt        Social History     Social History     Social History     Tobacco Use   Smoking Status Never    Passive exposure: Never   Smokeless Tobacco Never       Pertinent Lab Values     Lab Results   Component Value Date    CREATININE 1.28 05/15/2024       Lab Results   Component Value Date    PSA 4.198 (H) 07/19/2024    PSA 5.5 (H) 05/11/2023    PSA 4.7 (H) 02/01/2022         Pertinent Imaging     N/A      Pertinent Pathology     N/A      I have spent 40 minutes with Patient  today in which greater than 50% of this time was spent in counseling/coordination of care regarding Diagnostic results, Prognosis, Risks and benefits of tx options, Instructions for management, Patient and family education, Importance of tx compliance, Impressions, Counseling / Coordination of care, Documenting in the medical record, Reviewing / ordering tests, medicine, procedures  , and Obtaining or reviewing history  .    Please note this time includes cumulative time on the day of encounter, including reviewing medical records and/or coordinating care among the patient's other specialists.

## 2024-08-01 NOTE — TELEPHONE ENCOUNTER
Pt calling to see if he is able to get a refill of    testosterone cypionate (DEPO-TESTOSTERONE) 200 mg/mL [facility] pharmacy-     [id number] - [place] - [address]  [address] [address]  Phone: [phone]  Fax: [phone]     Pt call back- [phone]

## 2024-08-02 DIAGNOSIS — R79.89 LOW TESTOSTERONE: ICD-10-CM

## 2024-08-02 RX ORDER — TESTOSTERONE CYPIONATE 200 MG/ML
100 INJECTION, SOLUTION INTRAMUSCULAR ONCE
Qty: 0.5 ML | Refills: 0 | Status: SHIPPED | OUTPATIENT
Start: 2024-08-02 | End: 2024-08-02

## 2024-08-23 DIAGNOSIS — R97.20 ELEVATED PSA: ICD-10-CM

## 2024-08-23 DIAGNOSIS — R79.89 LOW TESTOSTERONE: ICD-10-CM

## 2024-08-23 RX ORDER — TADALAFIL 5 MG/1
5 TABLET ORAL DAILY PRN
Qty: 30 TABLET | Refills: 5 | Status: SHIPPED | OUTPATIENT
Start: 2024-08-23

## 2024-08-23 NOTE — TELEPHONE ENCOUNTER
The pharmacist at [facility] said that the syringes prescribed today, 08/23/24, are on a back order. He is asking if they can be replaced with a 23G syringe. Please contact pharmacy and/or send in a new prescription for the suggested alternative.

## 2024-08-23 NOTE — TELEPHONE ENCOUNTER
Reason for call:   [x] Refill   [] Prior Auth  [] Other:     Office:   [] PCP/Provider - Katey ruelas    Medication:           Pharmacy:   [facility]      Does the patient have enough for 3 days?   [x] Yes   [] No - Send as HP to POD

## 2024-08-26 DIAGNOSIS — R79.89 LOW TESTOSTERONE: ICD-10-CM

## 2024-08-26 DIAGNOSIS — R79.89 LOW TESTOSTERONE: Primary | ICD-10-CM

## 2024-08-26 RX ORDER — TESTOSTERONE CYPIONATE 200 MG/ML
100 INJECTION, SOLUTION INTRAMUSCULAR ONCE
Qty: 0.5 ML | Refills: 0 | Status: SHIPPED | OUTPATIENT
Start: 2024-08-26 | End: 2024-08-26

## 2024-08-27 ENCOUNTER — TELEPHONE (OUTPATIENT)
Age: 54
End: 2024-08-27

## 2024-08-27 NOTE — TELEPHONE ENCOUNTER
Patient called requesting refill for Testosterone. Patient made aware medication was refilled on 8/26/24 for 0.5 ml with 0  refills to [facility] pharmacy. Patient instructed to contact the pharmacy to obtain refills of medication. Patient verbalized understanding.

## 2024-08-27 NOTE — TELEPHONE ENCOUNTER
Per patient he went to  medication today and pharmacy states that prior auth needs to be done on medication below. Patient is out of medication and will need this done ASAP.       testosterone cypionate (DEPO-TESTOSTERONE) 200 mg/mL SOLN      Please call patient once this is done.       CB: [phone]

## 2024-09-03 NOTE — TELEPHONE ENCOUNTER
Patient calling in stating he still needs syringes for his testosterone. Patient requesting phone call back regarding this.       CB: [phone]

## 2024-09-03 NOTE — TELEPHONE ENCOUNTER
Spoke with Travis Badillo states needs needles to draw up medication. Was only sent to administer. Also, states RX sent for testosterone only sent enough to last a week. Will defer to providers.

## 2024-09-04 DIAGNOSIS — R79.89 LOW TESTOSTERONE: ICD-10-CM

## 2024-09-04 RX ORDER — TESTOSTERONE CYPIONATE 200 MG/ML
100 INJECTION, SOLUTION INTRAMUSCULAR
Qty: 2 ML | Refills: 3 | Status: SHIPPED | OUTPATIENT
Start: 2024-09-04

## 2024-09-24 ENCOUNTER — TELEPHONE (OUTPATIENT)
Age: 54
End: 2024-09-24

## 2024-09-24 NOTE — TELEPHONE ENCOUNTER
Patient called to ask the name of the medication that he needed to bring to his next appointment with Dr. Moreland. I read the notes and it was for Trimix. I gave him the name of the medication and the information for [facility] where he needs to  his prescription.

## 2024-10-02 ENCOUNTER — TELEPHONE (OUTPATIENT)
Dept: UROLOGY | Facility: CLINIC | Age: 54
End: 2024-10-02

## 2024-10-02 ENCOUNTER — OFFICE VISIT (OUTPATIENT)
Dept: UROLOGY | Facility: CLINIC | Age: 54
End: 2024-10-02
Payer: COMMERCIAL

## 2024-10-02 VITALS
OXYGEN SATURATION: 97 % | HEIGHT: 68 IN | SYSTOLIC BLOOD PRESSURE: 120 MMHG | WEIGHT: 200 LBS | HEART RATE: 65 BPM | DIASTOLIC BLOOD PRESSURE: 80 MMHG | BODY MASS INDEX: 30.31 KG/M2

## 2024-10-02 DIAGNOSIS — N52.9 ERECTILE DYSFUNCTION, UNSPECIFIED ERECTILE DYSFUNCTION TYPE: ICD-10-CM

## 2024-10-02 DIAGNOSIS — Z12.5 SCREENING FOR PROSTATE CANCER: ICD-10-CM

## 2024-10-02 DIAGNOSIS — E29.1 HYPOGONADISM IN MALE: ICD-10-CM

## 2024-10-02 DIAGNOSIS — N48.6 PEYRONIE'S DISEASE: Primary | ICD-10-CM

## 2024-10-02 PROCEDURE — 99215 OFFICE O/P EST HI 40 MIN: CPT | Performed by: UROLOGY

## 2024-10-02 NOTE — TELEPHONE ENCOUNTER
DO Mercy Ramos RN  Xiaflex injection appointments (2 appointments with 1 week of each other)    Once BI form filled out and faxed, patient can be scheduled for 2 xiaflex injections, both 15 min appts, need to be a week or less apart.     Will also need to ensure medication is shipped prior to appts.

## 2024-10-02 NOTE — PROGRESS NOTES
UROLOGY FOLLOW-UP ENCOUNTER    Travis Badillo is a 54 y.o. male with ePSA, low testosterone    Anticoagulation: none    Negative prostate biopsy in 2019    Multiparametric MRI of the prostate July 2022 which returned PI-RADS 2 no dominant lesion and calculated prostate volume of 46 mL     CT renal protocol 7/10/23: mild prostatic enlargement; otherwise negative    On Cialis 5 mg PRN sex since around 2023    Prev had outside provider manage TRT    Labs 7/19/24: PSA 4.198, total T, Hb 15.2, Hct 45.5    Restarted on TRT by [facility] uro in June 2024    U dip neg on 7/29/24    PVR 0 cc on 7/29/24      Penile curvature history:  -Penile curvature present since birth? No  -Date penile curvature was first noticed: around mid 2023  -Penile curvature painful during erections? Never  -Direction of penile curvature: left  -Patient estimation of degree of penile curvature: about 45 degrees  -Does patient have penile plaque present? If yes, where?: Left shaft  -Does patient require medication to achieve erection? Yes  --Medication: Cialis 5 mg  -Does the penile curvature make intercourse difficult or painful for patient: No  -Does the penile curvature make intercourse difficult or painful for the partner: No  -Previous therapies attempted for penile curvature (medical or surgical): none    About 2 cm left dorsal plaque at mid shaft on exam 7/29/24    Office test direction 10/2/2024:  0.75 units of standard Trimix given  45 degree leftward curvature  Plaque about 3.5 cm proximal to the corona  Total penile length 15 cm      Assessment and plan:     Peyronie's disease    We had an extensive discussion in the office today regarding Peyronie's disease.  I then explained to patient that treatment for Peyronie's disease is not emergent and is purely elective and designed to beneficially impact quality of life.    We reviewed proposed pathophysiology of the disease.  I explained that the disease is considered to have 2 separate phases, the  acute phase and the stable phase.  I explained that during the acute phase, erections are generally painful and the degree of curvature gradually increases over time.  I explained that the acute phase generally lasts about 6 to 9 months.  I explained that by the time erections are no longer painful and the degree of curvature has stabilized, the patient is considered to be in the stable phase.    I explained that while patients are in the acute phase, it is generally recommended that patients do not undergo correction of the disease.  I explained that penile straightening treatments are most often reserved for the stable phase of disease.  I explained that in the setting of active phase, patients are recommended to trial NSAIDs for ongoing pain.    I then explained that once patient enters stable phase, there are other treatment options available. The choice of treatments often comes down to the degree of curvature, location of curvature, and the degree of bother for the patient.    I therefore explained that per AUA guidelines, Peyronie's patients should have in-office test erections with intracavernosal injection. I reviewed the process of office test erection. I explained that in these cases, the provider will order a small supply of Trimix injection medication for the patient. The medication will be sent from the compound pharmacy to the patient's address. The patient then brings the medication and injection supplies to his office appointment. I then explained that on the follow-up appointment, the patient is injected by the provider in order to induce erection. Once erection is present, measurements are taken regarding location of curvature and degree of curvature.    We reviewed the risks of receiving Trimix for test injection. These risks include: pain/swelling/bruising at injection site, priapism, blood in urine, fainting/dizziness, development of scar tissue at injection site, low blood pressure, allergic  reaction (rash/itching/swelling/breathing difficulties). We discussed the potential issue of priapism in more detail. I explained that this is prolonged erection. I explained that typically, the erection should go down about 1 hour after administration. I explained that by definition priapism was erection lasting longer than 4 hours. I explained that it was important to seek urgent medical attention in cases such as this, as prolonged erections can lead to permanent fibrosis in erectile tissues leading to permanent erectile dysfunction. Patient verbalized understanding.    I explained that per AUA guidelines, patients with penile curvature between 30 and 90 degrees and intact erectile function (with or without medications), patients may consider intralesional injection of collagenase clostridium histolyticum (commonly known by trade name, Xiaflex) in combination with modeling (explained that this is when the penis is bent in the direction opposite of the curvature at the site of the plaque in hopes of making the penis more straight.    I explained that intralesional injection of collagenase clostridium histolyticum did have some risks including but not limited to: penile ecchymosis, swelling, pain, and corporal rupture.    I also explained that alternative injection treatments include intralesional interferon alpha-2b (side effects sinusitis, flu-like symptoms, minor penile swelling) and intralesional verapamil (side effects including penile bruising, dizziness, nausea, pain at injection site).    I explained that patients often inquire about extracorporeal shock wave therapy for Peyronie's disease, but studies have only shown that this potentially helps improve Peyronie's-associated pain rather than correct any curvature.    We then discussed the possible surgical options for correction of Peyronie's disease. I explained that surgical correct could only be considered in patients with stable disease.    I explained  that Peyronie's patients with good erectile function (with or without medications) desiring surgical correction could undergo tunical plication or plaque incision/excision/grafting. We discussed the general steps of these aforementioned procedures.    I then explained that Peyronie's patients with poor erectile function despite use of medications who wanted to pursue surgical correction may be offered inflatable penile prosthesis surgery. I explained that often times after penile implant is placed and inflated the curvature could go away or be significantly decreased. For patients needing additional straightening even after device is implanted, intraoperative adjunctive procedures such as modeling, plication, or incision/grafting could be performed. We did briefly discuss what these procedures entailed.       In-office measurement of penile curvature during test erection revealed 45 degree curvature in a left direction. 0.75 units of standard Trimix were administered today for test erection.    Standard Trimix dosing: (alprostatadil 10 mcg, papaverine 30 mg, phentolamine 1 mg - all in 1 cc vial - there are 10 “units” of the mixture in the 1 cc vial)    Since the patient received Trimix in the office for test erection today, I discussed the possibility of having persistent erection, also known as priapism, from the injection.    I advised the patient to monitor the erection once he went home. I went over the below plan with him:  -If erection still present once he gets home, I advised him to apply ice to his penis and to walk around the house (up and down stairs if possible).  -If erection still present after an hour of being home, I advised him to take pseudoephedrine 30 or 60 mg  -If erection still present after an hour of pseudoephedrine, I advised him to go to the emergency room for evaluation          Based on patient curvature measurements and patient's desire to avoid operative intervention for Peyronie's  "disease at this point, patient would like to pursue intralesional injection of collagenase clostridium histolyticum (Xiaflex).    We did discuss that there are certainly logistical issues with this treatment modality including multiple office visits, need for manual modeling at time of injection, pain, and risk of corporal rupture (around 1-2%).     I explained that most patients do not get a complete straightening of their penis after injection but most can see varying amounts of improvement.    I went over the general medication administration schedule.  -2 separate injections are administered within a 1 week time period  -Manual stretching exercises are performed by the patient for the next 4-6 weeks  -In the 4-6 week period after the injection, the patient should not have sex or participate in sexual activity  -This above cycle can be repeated up to 4 times  -After each injection, a tight dressing is placed over the penis, which can be removed the next day    We had further discussion regarding the manual stretching exercises that are to be performed after injection.  -Attempt after manual penile stretching or \"modeling\" should not be done until at least 48 hours after the second of the two injections.  -There are two types of modeling exercises  --While flaccid, the penis is gently pulled on stretch for 30 seconds, 3 times per day  --If patient obtains spontaneous erection, the penis is gently bent in the direction opposite of the curvature for about 30 seconds  -It was explained to the patient that overly aggressive modeling increased the risk of corporal rupture    The patient would like to initiate Xiaflex therapy. Arrangements will be made for patient order the medication and bring the medication into the office for his injection appointments.              Low testosterone    Tolerating testosterone cyprionate    Elevated PSA    Labs 7/19/24: PSA 4.198    Need to get PSA before end of this " "year    ED    Continue Cialis 5 mg PRN          PLAN  -Fu PSA  -Cont Cialis  -Cont testosterone cyprionate  -Patient wishes to pursue Xiaflex injection cycle.  We will call him and set him up for this.        Portions of the above record have been created with voice recognition software.  Occasional wrong word or \"sound alike\" substitution may have occurred due to the inherent limitations of voice recognition software.  Read the chart carefully and recognize, using context, where substitution may have occurred.      Sanket Moreland DO        Chief Complaint     Peyronie's disease    History of Present Illness     See summary above    No fevers or chills        The following portions of the patient's history were reviewed and updated as appropriate: allergies, current medications, past family history, past medical history, past social history, past surgical history and problem list.        AUA SYMPTOM SCORE      Flowsheet Row Most Recent Value   AUA SYMPTOM SCORE    How often have you had a sensation of not emptying your bladder completely after you finished urinating? 0 (P)     How often have you had to urinate again less than two hours after you finished urinating? 3 (P)     How often have you found you stopped and started again several times when you urinate? 0 (P)     How often have you found it difficult to postpone urination? 1 (P)     How often have you had a weak urinary stream? 0 (P)     How often have you had to push or strain to begin urination? 0 (P)     How many times did you most typically get up to urinate from the time you went to bed at night until the time you got up in the morning? 2 (P)     Quality of Life: If you were to spend the rest of your life with your urinary condition just the way it is now, how would you feel about that? 2 (P)     AUA SYMPTOM SCORE 6 (P)              Review of Systems     Negative for fevers, chills, nausea, vomiting, shortness of breath, abdominal pain, painful " "urination, blood in urine    Allergies     Allergies   Allergen Reactions    Amoxicillin-Pot Clavulanate Rash    Penicillins Anxiety, Hives, Other (See Comments), GI Intolerance, Photosensitivity and Rash       Physical Exam     No acute distress, normocephalic atraumatic, abdomen soft nontender, no CVA tenderness bilaterally    Office test direction 10/2/2024:  0.75 units of standard Trimix given  45 degree leftward curvature  Plaque about 3.5 cm proximal to the corona  Total penile length 15 cm      Vital Signs  Vitals:    10/02/24 1040   BP: 120/80   BP Location: Left arm   Patient Position: Sitting   Cuff Size: Standard   Pulse: 65   SpO2: 97%   Weight: 90.7 kg (200 lb)   Height: 5' 8\" (1.727 m)         Current Medications       Current Outpatient Medications:     albuterol (PROVENTIL HFA,VENTOLIN HFA) 90 mcg/act inhaler, Inhale 2 puffs every 4 (four) hours as needed, Disp: , Rfl:     Azelastine-Fluticasone 137-50 MCG/ACT SUSP, 1 spray into each nostril 2 (two) times a day, Disp: , Rfl:     fluticasone (FLONASE) 50 mcg/act nasal spray, 1 spray into each nostril as needed for rhinitis, Disp: , Rfl:     NEEDLE, DISP, 18 G 18G X 1-1/2\" MISC, Use every 14 (fourteen) days, Disp: 30 each, Rfl: 0    omeprazole (PriLOSEC) 40 MG capsule, Take 40 mg by mouth daily, Disp: , Rfl:     Synthroid 100 MCG tablet, take 1 tablet by mouth once daily, Disp: 90 tablet, Rfl: 1    SYRINGE-NEEDLE, DISP, 3 ML 23G X 1-1/2\" 3 ML MISC, Use every 7 days, Disp: 10 each, Rfl: 0    Syringe/Needle, Disp, (SYRINGE 3CC/23BB1-5/2\") 25G X 1-1/2\" 3 ML MISC, Use every 7 days, Disp: 5 each, Rfl: 2    tadalafil (CIALIS) 5 MG tablet, Take 1 tablet (5 mg total) by mouth daily as needed for erectile dysfunction, Disp: 30 tablet, Rfl: 5    testosterone cypionate (DEPO-TESTOSTERONE) 200 mg/mL SOLN, Inject 0.5 mL (100 mg total) into a muscle every 7 days, Disp: 2 mL, Rfl: 3    Active Problems     Patient Active Problem List   Diagnosis    Allergic rhinitis "    Elevated liver enzymes    Primary hypothyroidism    Localized osteoarthritis of right knee    Organic impotence    Rosacea    Lipoma of torso    History of colon polyps    Arthritis of right knee    Gross hematuria    Elevated PSA       Past Medical History     Past Medical History:   Diagnosis Date    Colon polyp     Disease of thyroid gland     Hypothyroid     Pollen allergies     PTSD (post-traumatic stress disorder)     Sleep apnea     Testosterone deficiency 2015       Surgical History     Past Surgical History:   Procedure Laterality Date    APPENDECTOMY  1985    ARTHROSCOPY KNEE Right 6/30/2016    Procedure: ARTHROSCOPY KNEE, MEDIAL MENISCECTOMY;  Surgeon: David Chawla MD;  Location: [facility] MAIN OR;  Service:     INJECTION ANESTHETIC AGENT TO CERVICAL PLEXUS      KNEE ARTHROSCOPY Right 1990    IA COLONOSCOPY FLX DX W/COLLJ SPEC WHEN PFRMD N/A 4/22/2019    Procedure: COLONOSCOPY;  Surgeon: Davion Rai MD;  [facility];  Service: Gastroenterology    IA EXCISION TUMOR SOFT TIS BACK/FLANK SUBQ 3 CM/> N/A 10/6/2022    Procedure: EXCISION  BIOPSY LESION/MASS BACK;  Surgeon: Jef Amador DO;  Location: [facility] MAIN OR;  Service: General         Family History     Family History   Problem Relation Age of Onset    Breast cancer Mother     Cancer Mother         Breast Cancer    Cancer Sister         3 sisters thyroid CA    No Known Problems Father     Hashimoto's thyroiditis Sister     Melanoma Maternal Aunt        Social History     Social History     Social History     Tobacco Use   Smoking Status Never    Passive exposure: Never   Smokeless Tobacco Never       Pertinent Lab Values     Lab Results   Component Value Date    CREATININE 1.28 05/15/2024       Lab Results   Component Value Date    PSA 4.198 (H) 07/19/2024    PSA 5.5 (H) 05/11/2023    PSA 4.7 (H) 02/01/2022         Pertinent Imaging     N/A      Pertinent Pathology     N/A        I have spent 40 minutes with Patient  today in  which greater than 50% of this time was spent in counseling/coordination of care regarding Diagnostic results, Prognosis, Risks and benefits of tx options, Instructions for management, Patient and family education, Importance of tx compliance, Impressions, Counseling / Coordination of care, Documenting in the medical record, Reviewing / ordering tests, medicine, procedures  , and Obtaining or reviewing history  .    Please note this time includes cumulative time on the day of encounter, including reviewing medical records and/or coordinating care among the patient's other specialists.

## 2024-10-02 NOTE — TELEPHONE ENCOUNTER
Check out note on 10/2. Just so you know      Return for boogie will set up injection appointments.

## 2024-10-10 NOTE — TELEPHONE ENCOUNTER
Jeffrey called regarding Xialflex order sent in on 10/4. He said we included an insurance group number but they need the insurance ID numbers, preferably copies of the cards (front and back) to obtain authorization along with most recent office note.     Fax number [phone]

## 2024-10-10 NOTE — TELEPHONE ENCOUNTER
Pt called, sts that he has missed a call from the Urology office.  Informed pt of previous message.  Pt sts that she is walking and he can not write the appts down.  Pt sts that he will call back once he gets to his destination to go over appts.

## 2024-10-11 NOTE — TELEPHONE ENCOUNTER
Felicia from [employer] called to ask that copies of the front and back of the patient's insurance cards be faxed over if possible.    Fax number [phone]    Caller asked that this message be sent specifically to RN.     Call back if needed [phone] ext [phone]

## 2024-10-21 DIAGNOSIS — R79.89 LOW TESTOSTERONE: ICD-10-CM

## 2024-10-21 RX ORDER — TESTOSTERONE CYPIONATE 200 MG/ML
100 INJECTION, SOLUTION INTRAMUSCULAR
Qty: 2 ML | Refills: 3 | Status: SHIPPED | OUTPATIENT
Start: 2024-10-21

## 2024-10-22 NOTE — TELEPHONE ENCOUNTER
Pt calling to have the 23 G  needle sent to [facility] in [place]    Pt also requested to have the [facility] removed from his record.

## 2024-10-22 NOTE — TELEPHONE ENCOUNTER
Patient will check with pharmacy because they did not give him the two types of syringes he needs but both were sent over. He will call us back if there is any more issues with needles needed.

## 2024-10-22 NOTE — TELEPHONE ENCOUNTER
"Julia from [facility] pharmacy stated she received the 2 scripts for 2 different needles.  States she does not have 23G in 1 1/2\", but she does have it in 1\", is this okay?  Length   Please call her and let her know at [phone]   "

## 2024-10-23 ENCOUNTER — APPOINTMENT (OUTPATIENT)
Dept: LAB | Facility: CLINIC | Age: 54
End: 2024-10-23
Payer: COMMERCIAL

## 2024-10-23 DIAGNOSIS — Z12.5 SPECIAL SCREENING, PROSTATE CANCER: Primary | ICD-10-CM

## 2024-10-23 LAB — PSA SERPL-MCNC: 4.69 NG/ML (ref 0–4)

## 2024-10-23 PROCEDURE — 36415 COLL VENOUS BLD VENIPUNCTURE: CPT

## 2024-10-23 PROCEDURE — 84153 ASSAY OF PSA TOTAL: CPT

## 2024-10-30 ENCOUNTER — TELEPHONE (OUTPATIENT)
Age: 54
End: 2024-10-30

## 2024-10-30 NOTE — TELEPHONE ENCOUNTER
Specialty Pharmacy called to request for the Xiaflex that most recent OV note and insurance cards be faxed. Faxed both items to [phone]

## 2024-11-01 ENCOUNTER — TELEPHONE (OUTPATIENT)
Dept: UROLOGY | Facility: CLINIC | Age: 54
End: 2024-11-01

## 2024-11-07 NOTE — TELEPHONE ENCOUNTER
Edilma from [facility] speciality pharmacy called and scheduled a drop off for Xiaflex. I provided her the address [address], [address] for Tuesday. 11/12/2024.

## 2024-11-14 ENCOUNTER — RA CDI HCC (OUTPATIENT)
Dept: OTHER | Facility: HOSPITAL | Age: 54
End: 2024-11-14

## 2024-11-20 ENCOUNTER — OFFICE VISIT (OUTPATIENT)
Dept: FAMILY MEDICINE CLINIC | Facility: CLINIC | Age: 54
End: 2024-11-20
Payer: COMMERCIAL

## 2024-11-20 VITALS
HEART RATE: 56 BPM | DIASTOLIC BLOOD PRESSURE: 82 MMHG | BODY MASS INDEX: 31.07 KG/M2 | TEMPERATURE: 97 F | HEIGHT: 68 IN | WEIGHT: 205 LBS | SYSTOLIC BLOOD PRESSURE: 134 MMHG | RESPIRATION RATE: 20 BRPM

## 2024-11-20 DIAGNOSIS — Z13.0 SCREENING FOR DEFICIENCY ANEMIA: ICD-10-CM

## 2024-11-20 DIAGNOSIS — E78.5 DYSLIPIDEMIA: ICD-10-CM

## 2024-11-20 DIAGNOSIS — E03.9 PRIMARY HYPOTHYROIDISM: Primary | ICD-10-CM

## 2024-11-20 PROCEDURE — 99213 OFFICE O/P EST LOW 20 MIN: CPT | Performed by: FAMILY MEDICINE

## 2024-11-20 RX ORDER — LEVOTHYROXINE SODIUM 100 MCG
100 TABLET ORAL DAILY
Qty: 90 TABLET | Refills: 3 | Status: SHIPPED | OUTPATIENT
Start: 2024-11-20

## 2024-11-20 NOTE — ASSESSMENT & PLAN NOTE
TSH is at goal   Orders:    T4, free; Future    TSH, 3rd generation; Future    Synthroid 100 MCG tablet; Take 1 tablet (100 mcg total) by mouth daily

## 2024-11-20 NOTE — PROGRESS NOTES
"Name: Travis Badillo      : 1970      MRN: [id number]  Encounter Provider: Mary Albert DO  Encounter Date: 2024   Encounter department: [facility]  :  Assessment & Plan  Primary hypothyroidism  TSH is at goal   Orders:    T4, free; Future    TSH, 3rd generation; Future    Synthroid 100 MCG tablet; Take 1 tablet (100 mcg total) by mouth daily    Dyslipidemia  Worsening  Advised to cut down on red meat   He is going to get the free cardiac screening for first responders.   Orders:    Comprehensive metabolic panel; Future    Lipid Panel with Direct LDL reflex; Future    Screening for deficiency anemia    Orders:    CBC; Future      Return in about 1 year (around 2025) for Annual physical.     History of Present Illness     He continues to follow up with  team.   He has not been exercising as much.  He thinks that may be why his cholesterols been going up.  His energy level has been normal.  He is taking his thyroid medication every day.          Review of Systems       Objective   /82   Pulse 56   Temp (!) 97 °F (36.1 °C)   Resp 20   Ht 5' 8\" (1.727 m)   Wt 93 kg (205 lb)   BMI 31.17 kg/m²      Physical Exam  Vitals and nursing note reviewed.   Constitutional:       General: He is not in acute distress.     Appearance: He is well-developed.   HENT:      Right Ear: Tympanic membrane normal.      Left Ear: Tympanic membrane normal.   Cardiovascular:      Rate and Rhythm: Normal rate and regular rhythm.      Heart sounds: No murmur heard.  Pulmonary:      Effort: Pulmonary effort is normal. No respiratory distress.      Breath sounds: Normal breath sounds.   Neurological:      Mental Status: He is alert.   Psychiatric:         Mood and Affect: Mood normal.         "

## 2024-11-20 NOTE — ASSESSMENT & PLAN NOTE
Worsening  Advised to cut down on red meat   He is going to get the free cardiac screening for first responders.   Orders:    Comprehensive metabolic panel; Future    Lipid Panel with Direct LDL reflex; Future

## 2024-11-29 DIAGNOSIS — R79.89 LOW TESTOSTERONE: ICD-10-CM

## 2024-11-29 NOTE — TELEPHONE ENCOUNTER
Reason for call:     Change pharmacy to [facility]    Patient states this script keeps getting messed up, please resend testosterone and both needles to [facility]    [x] Refill   [] Prior Auth  [] Other:     Office:   [] PCP/Provider -   [x] Specialty/Provider - uro b utzazakia    Medication:   Testosterone 2ml  Syringe 25 G, 10  Syringe  23 G 10    Pharmacy: [facility]    Does the patient have enough for 3 days?   [] Yes   [x] No - Send as HP to POD

## 2024-12-02 RX ORDER — TESTOSTERONE CYPIONATE 200 MG/ML
100 INJECTION, SOLUTION INTRAMUSCULAR
Qty: 2 ML | Refills: 3 | Status: SHIPPED | OUTPATIENT
Start: 2024-12-02

## 2024-12-03 NOTE — TELEPHONE ENCOUNTER
Pt calling to see if his script was sent to [facility].  I did inform pt that it looks like it was sent to the pharmacy yesterday

## 2024-12-25 PROBLEM — F10.90 ALCOHOL USE: Status: ACTIVE | Noted: 2023-04-24

## 2025-01-20 NOTE — PROGRESS NOTES
Travis Badillo is a 54 y.o. male with ePSA, low testosterone, Peyronie's disease     Anticoagulation: none     Negative prostate biopsy in 2019     Multiparametric MRI of the prostate July 2022 which returned PI-RADS 2 no dominant lesion and calculated prostate volume of 46 mL      CT renal protocol 7/10/23: mild prostatic enlargement; otherwise negative     On Cialis 5 mg PRN sex since around 2023     Prev had outside provider manage TRT     Labs 7/19/24: PSA 4.198, total T, Hb 15.2, Hct 45.5     Restarted on TRT by [facility] uro in June 2024     U dip neg on 7/29/24     PVR 0 cc on 7/29/24        Penile curvature history:  -Penile curvature present since birth? No  -Date penile curvature was first noticed: around mid 2023  -Penile curvature painful during erections? Never  -Direction of penile curvature: left  -Patient estimation of degree of penile curvature: about 45 degrees  -Does patient have penile plaque present? If yes, where?: Left shaft  -Does patient require medication to achieve erection? Yes  --Medication: Cialis 5 mg  -Does the penile curvature make intercourse difficult or painful for patient: No  -Does the penile curvature make intercourse difficult or painful for the partner: No  -Previous therapies attempted for penile curvature (medical or surgical): none     About 2 cm left dorsal plaque at mid shaft on exam 7/29/24     Office test direction 10/2/2024:  0.75 units of standard Trimix given  45 degree leftward curvature  Plaque about 3.5 cm proximal to the corona  Total penile length 15 cm              Collagenase clostridium histolyticum office injection procedure     Date: 1/22/25     Time: There are other unrelated non-urgent complaints, but due to the busy schedule and the amount of time I've already spent with him, time does not permit me to address these routine issues at today's visit. I've requested another appointment to review these additional issues.     Diagnosis: Peyronie's disease      Injection # 1     Cycle # 1     Prior to his injection today, I have discussed and reviewed all of the details of the patient's history as well as physical exam.     Patient or stands the risk, benefits, and alternatives to his chosen treatment today.  He understands that while he has been diagnosed with Peyronie's disease (or abnormal penile curvature), his treatment is not a medical emergency and that it is a purely elective procedure designed to beneficially impact his quality of life.     I summarized to the patient the following today:  - The patient understands the need for continued follow-up for his Xiaflex/collagenase and I specifically outlined what this would entail including the chronicity of cycled treatments  - I have also explained the idea of stretching exercises that the patient would need to perform while at home as well as additional treatment courses spread over a 4 to 6-week period per treatment course.  - The patient understands that Xiaflex/collagenase treatments pose risks of injury to penile arteries that may cause bruising.  He further understands that Xiaflex/collagenase has not been approved for use in hourglass deformities and that penile implantation or corporal grafting is the preferred treatment in this scenario.  - Lastly, I have explained to be on the look out for signs of penile fracture-this includes a popping sensation of the penis both flaccid and erect, as well as hematuria, significant penile pain, significant penile bruising or swelling, or immediate loss of interaction with pain.     I have reiterated the risk of corporal rupture as well as the likelihood that plaque injections will not beneficially impact his penile length.  All questions were answered.     Timeout performed: Yes     Consent obtained: Yes     Description of procedure:  Preprocedural physical exam performed.  Plaque was palpated.  Penile area was prepped with alcohol swab.  Xiaflex medication was already  reconstituted with 0.25 mL of the mixture drawn up in the office.     Needle was introduced into the plaque at the area designated as the point of maximum curvature on previous examination.  0.25 mL of the Xiaflex mixture was then injected into the area of the plaque.  Needle was simultaneously pulled back during injection in order to administer medication equally throughout the plaque.     Penis was then wrapped with Maty gauze and Coban.     Patient tolerated procedure well.     I additionally instructed the patient to remove his Coban dressing tomorrow.              PLAN  -Scheduled for second injection on 1/29/2025 at 8:30 AM

## 2025-01-22 ENCOUNTER — OFFICE VISIT (OUTPATIENT)
Dept: UROLOGY | Facility: CLINIC | Age: 55
End: 2025-01-22
Payer: COMMERCIAL

## 2025-01-22 VITALS
DIASTOLIC BLOOD PRESSURE: 70 MMHG | SYSTOLIC BLOOD PRESSURE: 108 MMHG | WEIGHT: 205.6 LBS | HEART RATE: 88 BPM | BODY MASS INDEX: 31.16 KG/M2 | HEIGHT: 68 IN | OXYGEN SATURATION: 96 %

## 2025-01-22 DIAGNOSIS — N48.6 PEYRONIE'S DISEASE: Primary | ICD-10-CM

## 2025-01-22 PROCEDURE — 54200 INJECTION PX PEYRONIE DS: CPT | Performed by: UROLOGY

## 2025-01-22 PROCEDURE — 99213 OFFICE O/P EST LOW 20 MIN: CPT | Performed by: UROLOGY

## 2025-01-22 NOTE — PATIENT INSTRUCTIONS
Xiaflex injection post procedure instructions    You just completed an office visit for Xiaflex injection.    You have a penis wrap dressing in place. You can take this off tomorrow. You can shower after you take the dressing off.    A wide range of bruising is normal after your injection. Some patients have no bruising. Some patients can have black and blue throughout almost the entire penis.    Besides bruising, other possible side effects include penile swelling, pain at injection site, blistering at injection site, small lump at injection site, temporary erectile dysfunction while the area heals (this is mostly secondary to pain rather than actual injury), itching of your genitalia, significant allergic reaction known as anaphylaxis.    Some patients can get a very rare side effect of back pain that spreads to legs and arms. The can pain can feel like spasms that make it hard to walk. These symptoms should only last about 25 minutes and resolve on their own. Even if these symptoms resolve on their own, you should call the office if this occurs.    Rarely, patients can experience dizziness after injection. If this is the case, please lie down until symptoms go away. If symptoms persist, please call office or go the ED.    If you get any of the following rare but dangerous signs or symptoms please go to the ED right away:  -hives  -facial swelling  -breathing trouble  -chest pain  -low blood pressure  -fainting    <1% of patients receiving Xiaflex injection can experience a serious complication known as corporal rupture (aka penile fracture). This occurs when the tough outer layer (tunica albuginea) of your corpora cavernosa (the two large tubes in your penis that fill up with blood during erection) gets a hole in it, which causes large amount of bleeding and swelling in the penis.  How do I know if I have corporal rupture?  You will SIGNIFICANT swelling and discoloration in your penis. Some describe corporal  "rupture penises as having \"eggplant appearance.\"  Corporal rupture usually occurs if penis goes through trauma in the setting of an erection (such as in a sexual encounter). Patients often hearing an audible \"pop\" and will notice IMMEDIATE swelling.  As you will see in physical activity instructions below, you should not be engaging in masturbation or any sexual activity for at least 4-6 weeks after your second injection  What should I do if I am concerned that I have corporal rupture?  Please call the urology office line first. Do not go to the ED unless instructed to do so by the urology staff. This is a very specific problem that many ED physicians may wrongly diagnose without advanced knowledge of the history that would be provided by urology staff.  Does a corporal rupture require surgery to fix it?  Not always, but you will certainly need close evaluation by the urology to make this determination, so it is very important to call us if you think you have corporal rupture!  If I have some bruising and swelling, but not severe, might I still have corporal rupture?  Without the signs and symptoms mentioned above, it is extremely unlikely that you have corporal rupture and likely just have expected post procedure bruising and swelling. In these cases, you just need to monitor area and symptoms should resolve within days to weeks.    Physical activity after injection  No masturbation or sexual activity in between your two injections  No masturbation or sexual activity until 4-6 weeks after your SECOND injection  Avoid activities that may cause you to strain your abdomen during the healing process  If you are prone to constipation, it is recommended that you take an over the counter stool softener such as Miralax or Senna-wilver to avoid straining during your bowel movements  If possible, you should avoid heavy lifting (anything heavier than 10 pounds) in between your two injections and for at least one week after the " "second injection. If your job requires you to do heavy lifting, it is strongly recommended that you hold off until about 48 hours after each of your injections before you attempt to lift heavy objects. If you have significant bruising or swelling at your penis, even if it is 48 hours after your injection, it is recommended that you wear tight fitting underwear and wait at least 1 day until considering a return to heavy lifting.  In general, use the common sense rule of \"if it hurts you, you probably should not be doing it.\"  Would recommend avoiding cardio exercises (running, biking, rowing, etc.) in between your injections and about 1 week after your second injection    3 days after your SECOND injection you will partake in the stretching exercises as outlined below. Again, this is only after your SECOND injection. You should do absolutely no stretching exercises in between the two injections.  (Images below courtesy of [employer], the company that produces the Xiaflex product)     "

## 2025-01-23 DIAGNOSIS — R79.89 LOW TESTOSTERONE: ICD-10-CM

## 2025-01-23 DIAGNOSIS — E03.9 PRIMARY HYPOTHYROIDISM: ICD-10-CM

## 2025-01-24 RX ORDER — LEVOTHYROXINE SODIUM 100 MCG
100 TABLET ORAL DAILY
Qty: 90 TABLET | Refills: 3 | Status: SHIPPED | OUTPATIENT
Start: 2025-01-24

## 2025-01-24 RX ORDER — TESTOSTERONE CYPIONATE 200 MG/ML
100 INJECTION, SOLUTION INTRAMUSCULAR
Qty: 2 ML | Refills: 3 | Status: SHIPPED | OUTPATIENT
Start: 2025-01-24

## 2025-01-24 NOTE — PROGRESS NOTES
Travis Badillo is a 54 y.o. male with ePSA, low testosterone, Peyronie's disease     Anticoagulation: none     Negative prostate biopsy in 2019     Multiparametric MRI of the prostate July 2022 which returned PI-RADS 2 no dominant lesion and calculated prostate volume of 46 mL      CT renal protocol 7/10/23: mild prostatic enlargement; otherwise negative     On Cialis 5 mg PRN sex since around 2023     Prev had outside provider manage TRT     Labs 7/19/24: PSA 4.198, total T, Hb 15.2, Hct 45.5     Restarted on TRT by [facility] u[facility] in June 2024     U dip neg on 7/29/24     PVR 0 cc on 7/29/24        Penile curvature history:  -Penile curvature present since birth? No  -Date penile curvature was first noticed: around mid 2023  -Penile curvature painful during erections? Never  -Direction of penile curvature: left  -Patient estimation of degree of penile curvature: about 45 degrees  -Does patient have penile plaque present? If yes, where?: Left shaft  -Does patient require medication to achieve erection? Yes  --Medication: Cialis 5 mg  -Does the penile curvature make intercourse difficult or painful for patient: No  -Does the penile curvature make intercourse difficult or painful for the partner: No  -Previous therapies attempted for penile curvature (medical or surgical): none     About 2 cm left dorsal plaque at mid shaft on exam 7/29/24     Office test direction 10/2/2024:  0.75 units of standard Trimix given  45 degree leftward curvature  Plaque about 3.5 cm proximal to the corona  Total penile length 15 cm    Xiaflex cycle #1, injection #1 on 1/22/2025              Collagenase clostridium histolyticum office injection procedure     Date: 1/29/25     Time: There are other unrelated non-urgent complaints, but due to the busy schedule and the amount of time I've already spent with him, time does not permit me to address these routine issues at today's visit. I've requested another appointment to review these additional  issues.     Diagnosis: Peyronie's disease     Injection # 2     Cycle # 1     Prior to his injection today, I have discussed and reviewed all of the details of the patient's history as well as physical exam.     Patient or stands the risk, benefits, and alternatives to his chosen treatment today.  He understands that while he has been diagnosed with Peyronie's disease (or abnormal penile curvature), his treatment is not a medical emergency and that it is a purely elective procedure designed to beneficially impact his quality of life.     I summarized to the patient the following today:  - The patient understands the need for continued follow-up for his Xiaflex/collagenase and I specifically outlined what this would entail including the chronicity of cycled treatments  - I have also explained the idea of stretching exercises that the patient would need to perform while at home as well as additional treatment courses spread over a 4 to 6-week period per treatment course.  - The patient understands that Xiaflex/collagenase treatments pose risks of injury to penile arteries that may cause bruising.  He further understands that Xiaflex/collagenase has not been approved for use in hourglass deformities and that penile implantation or corporal grafting is the preferred treatment in this scenario.  - Lastly, I have explained to be on the look out for signs of penile fracture-this includes a popping sensation of the penis both flaccid and erect, as well as hematuria, significant penile pain, significant penile bruising or swelling, or immediate loss of interaction with pain.     I have reiterated the risk of corporal rupture as well as the likelihood that plaque injections will not beneficially impact his penile length.  All questions were answered.     Timeout performed: Yes     Consent obtained: Yes     Description of procedure:  Preprocedural physical exam performed.  Plaque was palpated.  Penile area was prepped with  alcohol swab.  Xiaflex medication was already reconstituted with 0.25 mL of the mixture drawn up in the office.     Needle was introduced into the plaque at the area designated as the point of maximum curvature on previous examination.  0.25 mL of the Xiaflex mixture was then injected into the area of the plaque.  Needle was simultaneously pulled back during injection in order to administer medication equally throughout the plaque.     Penis was then wrapped with Maty gauze and Coban.     Patient tolerated procedure well.     I additionally instructed the patient to remove his Coban dressing tomorrow.

## 2025-01-29 ENCOUNTER — OFFICE VISIT (OUTPATIENT)
Dept: UROLOGY | Facility: CLINIC | Age: 55
End: 2025-01-29
Payer: COMMERCIAL

## 2025-01-29 VITALS
OXYGEN SATURATION: 99 % | BODY MASS INDEX: 30.31 KG/M2 | HEIGHT: 68 IN | DIASTOLIC BLOOD PRESSURE: 74 MMHG | SYSTOLIC BLOOD PRESSURE: 118 MMHG | HEART RATE: 56 BPM | WEIGHT: 200 LBS

## 2025-01-29 DIAGNOSIS — N48.6 PEYRONIE'S DISEASE: Primary | ICD-10-CM

## 2025-01-29 PROCEDURE — 99213 OFFICE O/P EST LOW 20 MIN: CPT | Performed by: UROLOGY

## 2025-01-29 PROCEDURE — 54200 INJECTION PX PEYRONIE DS: CPT | Performed by: UROLOGY

## 2025-01-29 NOTE — PATIENT INSTRUCTIONS
Xiaflex injection post procedure instructions    You just completed an office visit for Xiaflex injection.    You have a penis wrap dressing in place. You can take this off tomorrow. You can shower after you take the dressing off.    A wide range of bruising is normal after your injection. Some patients have no bruising. Some patients can have black and blue throughout almost the entire penis.    Besides bruising, other possible side effects include penile swelling, pain at injection site, blistering at injection site, small lump at injection site, temporary erectile dysfunction while the area heals (this is mostly secondary to pain rather than actual injury), itching of your genitalia, significant allergic reaction known as anaphylaxis.    Some patients can get a very rare side effect of back pain that spreads to legs and arms. The can pain can feel like spasms that make it hard to walk. These symptoms should only last about 25 minutes and resolve on their own. Even if these symptoms resolve on their own, you should call the office if this occurs.    Rarely, patients can experience dizziness after injection. If this is the case, please lie down until symptoms go away. If symptoms persist, please call office or go the ED.    If you get any of the following rare but dangerous signs or symptoms please go to the ED right away:  -hives  -facial swelling  -breathing trouble  -chest pain  -low blood pressure  -fainting    <1% of patients receiving Xiaflex injection can experience a serious complication known as corporal rupture (aka penile fracture). This occurs when the tough outer layer (tunica albuginea) of your corpora cavernosa (the two large tubes in your penis that fill up with blood during erection) gets a hole in it, which causes large amount of bleeding and swelling in the penis.  How do I know if I have corporal rupture?  You will SIGNIFICANT swelling and discoloration in your penis. Some describe corporal  "rupture penises as having \"eggplant appearance.\"  Corporal rupture usually occurs if penis goes through trauma in the setting of an erection (such as in a sexual encounter). Patients often hearing an audible \"pop\" and will notice IMMEDIATE swelling.  As you will see in physical activity instructions below, you should not be engaging in masturbation or any sexual activity for at least 4-6 weeks after your second injection  What should I do if I am concerned that I have corporal rupture?  Please call the urology office line first. Do not go to the ED unless instructed to do so by the urology staff. This is a very specific problem that many ED physicians may wrongly diagnose without advanced knowledge of the history that would be provided by urology staff.  Does a corporal rupture require surgery to fix it?  Not always, but you will certainly need close evaluation by the urology to make this determination, so it is very important to call us if you think you have corporal rupture!  If I have some bruising and swelling, but not severe, might I still have corporal rupture?  Without the signs and symptoms mentioned above, it is extremely unlikely that you have corporal rupture and likely just have expected post procedure bruising and swelling. In these cases, you just need to monitor area and symptoms should resolve within days to weeks.    Physical activity after injection  No masturbation or sexual activity in between your two injections  No masturbation or sexual activity until 4-6 weeks after your SECOND injection  Avoid activities that may cause you to strain your abdomen during the healing process  If you are prone to constipation, it is recommended that you take an over the counter stool softener such as Miralax or Senna-wilver to avoid straining during your bowel movements  If possible, you should avoid heavy lifting (anything heavier than 10 pounds) in between your two injections and for at least one week after the " "second injection. If your job requires you to do heavy lifting, it is strongly recommended that you hold off until about 48 hours after each of your injections before you attempt to lift heavy objects. If you have significant bruising or swelling at your penis, even if it is 48 hours after your injection, it is recommended that you wear tight fitting underwear and wait at least 1 day until considering a return to heavy lifting.  In general, use the common sense rule of \"if it hurts you, you probably should not be doing it.\"  Would recommend avoiding cardio exercises (running, biking, rowing, etc.) in between your injections and about 1 week after your second injection    3 days after your SECOND injection you will partake in the stretching exercises as outlined below. Again, this is only after your SECOND injection. You should do absolutely no stretching exercises in between the two injections.  (Images below courtesy of [employer], the company that produces the Xiaflex product)      "

## 2025-02-18 DIAGNOSIS — R79.89 LOW TESTOSTERONE: ICD-10-CM

## 2025-02-18 DIAGNOSIS — R97.20 ELEVATED PSA: ICD-10-CM

## 2025-02-18 RX ORDER — TESTOSTERONE CYPIONATE 200 MG/ML
100 INJECTION, SOLUTION INTRAMUSCULAR
Qty: 2 ML | Refills: 0 | Status: SHIPPED | OUTPATIENT
Start: 2025-02-18

## 2025-02-18 RX ORDER — TADALAFIL 5 MG/1
5 TABLET ORAL DAILY PRN
Qty: 30 TABLET | Refills: 1 | Status: SHIPPED | OUTPATIENT
Start: 2025-02-18

## 2025-02-18 NOTE — TELEPHONE ENCOUNTER
Refill must be reviewed and completed by the office or provider. The refill is unable to be approved or denied by the medication management team.     Off protocol    Showentries  Search:  Patient Id Prescription # Filled Written Drug Label Qty Days Strength MME** Prescriber Pharmacy Payment REFILL #/Auth State Detail   1 [id number] 01/24/2025 01/24/2025 Testosterone Cypionate (Oil) 2.0 14 200 MG/1 ML [id number] [place] 0 / 3 [place]    1 [id number] 12/30/2024 12/02/2024 Testosterone Cypionate (Oil) 2.0 14 200 MG/1 ML [id number] Commercial Insurance 1 / 3 [place]    1 [id number] 12/02/2024 12/02/2024 Testosterone Cypionate (Oil) 2.0 14 200 MG/1 ML [id number] Commercial Insurance 0 / 3 [place]    1 [id number] 10/21/2024 10/21/2024 Testosterone Cypionate (Oil) 2.0 28 200 MG[facility] [place] 0 / 3 [place]    1 [id number] 09/04/2024 09/04/2024 Testosterone Cypionate (Oil) 2.0 14 200 MG/1 ML [id number] Commercial [place] 0 / 3 [place]    1 [id number] 08/28/2024 08/26/2024 Testosterone Cypionate (Oil) 1.0 2 200 MG/1 ML [id number] Commercial Insurance 0 / 0 [place]    1 [id number] 08/02/2024 08/02/2024 Testosterone Cypionate (Oil) 1.0 1 200 MG/1 ML [id number] Commercial Insurance 0 / 0 [place]    1 [id number] 07/16/2024 07/16/2024 Testosterone Cypionate (Oil) 1.0 2 200 MG/1 ML  [id number] Commercial Insurance 0 / 0 [place]    1 [id number] 06/28/2024 06/28/2024 Testosterone Cypionate (Oil) 1.0 2 200 MG/1 ML [facility] [employer]  [place], [facility] Commercial Insurance 0 / 0 [place]    1 [id number] 05/31/2024 05/31/2024 Testosterone Cypionate (Oil) 1.0 1 200 MG/1 ML [facility] Commercial Insurance 0 / 0 [place]

## 2025-02-18 NOTE — TELEPHONE ENCOUNTER
Reason for call:   [x] Refill   [] Prior Auth  [] Other:     Office:   [] PCP/Provider -   [x] Specialty/Provider -Sanket Moreland, Urology [facility]      Medication: Tadalafil 5mg One daily as needed #30                                               [id number]/ml   Inject 0.5mls (100mg total) into muscle every seven days    Does the patient have enough for 3 days?   [x] Yes   [] No - Send as HP to POD

## 2025-03-06 DIAGNOSIS — R79.89 LOW TESTOSTERONE: ICD-10-CM

## 2025-03-06 NOTE — TELEPHONE ENCOUNTER
Patient is calling to request a prescription refill.    Last seen by: Dr Moreland    Medication: testosterone cypionate (DEPO-TESTOSTERONE) 200 mg/mL SOLN    Pharmacy: [facility] [id number]  [place] [address] [phone]     30 / 90 day supply:     Pt can be reached at: [phone]    In addition patient asked that a message be sent to inform MD that he has stopped taking the Synthroid Rx and and is now using [facility] [employer]. Patient is asking if a lab can be ordered to test his thyroid levels. .    In addition the patient wants to know if Dr Moreland knows anything about the use of red light therapy for peyronies disease?     Please review.

## 2025-03-07 RX ORDER — TESTOSTERONE CYPIONATE 200 MG/ML
100 INJECTION, SOLUTION INTRAMUSCULAR
Qty: 2 ML | Refills: 0 | OUTPATIENT
Start: 2025-03-07

## 2025-03-10 ENCOUNTER — OFFICE VISIT (OUTPATIENT)
Dept: UROLOGY | Facility: CLINIC | Age: 55
End: 2025-03-10
Payer: COMMERCIAL

## 2025-03-10 VITALS
WEIGHT: 194 LBS | SYSTOLIC BLOOD PRESSURE: 146 MMHG | DIASTOLIC BLOOD PRESSURE: 92 MMHG | BODY MASS INDEX: 29.4 KG/M2 | OXYGEN SATURATION: 98 % | HEART RATE: 62 BPM | HEIGHT: 68 IN

## 2025-03-10 DIAGNOSIS — N48.6 PEYRONIE'S DISEASE: ICD-10-CM

## 2025-03-10 DIAGNOSIS — R79.89 LOW TESTOSTERONE: Primary | ICD-10-CM

## 2025-03-10 PROCEDURE — 99213 OFFICE O/P EST LOW 20 MIN: CPT

## 2025-03-10 NOTE — PATIENT INSTRUCTIONS
Please abstain from ejaculation, biking, sitting on a rowing machine for 3 days prior to PSA as this can falsely elevate PSA levels.

## 2025-03-10 NOTE — PROGRESS NOTES
Name: Travis Badillo      : 1970      MRN: [id number]  Encounter Provider: PRIMO Encinas  Encounter Date: 3/10/2025   Encounter department: [facility] [place]  :  Assessment & Plan  Low testosterone  -Patient is on testosterone cypionate injections 200 mg once per week.  -He states he is doing well.  He had a normal NYLA today.  -I have ordered repeat labs for patient to complete now including repeat testosterone, CBC, PSA.  We discussed his history of elevated PSA.  His PSA has been stable and last PSA was 4.689 completed in 2024.  His PSA has been as high as 5.5 in May 2023.  He had a negative prostate biopsy in .  He did have a negative multiparametric MRI of the prostate in 2022 which showed PI-RADS 4 and calculated prostate volume of 46 mL.      Orders:    Testosterone, free, total; Future    CBC and differential; Future    PSA Total, Diagnostic; Future    Peyronie's disease  -Patient scheduled for follow-up with Dr. Moreland in 2025.       -We will plan for patient to follow-up with AGUSTINA in 6 months with repeat labs now and in 6 months prior to follow-up.  All questions addressed.  Please do not hesitate to reach out with further questions or concerns.    History of Present Illness   Travis Badillo is a 54 y.o. male who presents with history of elevated PSA, low testosterone, Peyronie's disease presenting today for follow-up.  Patient is known to Dr. Moreland.  Patient had Xiaflex injection by Dr. Moreland in the office on 25.    Patient has history of TRT management.  He was restarted on TRT by urology service in 2024.  He is on 200 mg testosterone cypionate once per week.  He has not had repeat labs since 2024.  We will repeat labs now.  He states he is doing well.  He denies voiding symptoms or urinary complaints.  His most recent PSA is 4.689.  He has history of a negative prostate biopsy in .  He had a negative multiparametric  "prostate MRI in July 2022 showing PI-RADS 2, calculated prostate volume of 46 mL.  He had a previous CT scan in July 2023 that did show mild prostatic enlargement.       On Cialis 5 mg PRN.          PSA Lab Trends:  4.689 (10/23/24)  4.198 (7/19/24)  5.5 (5/11/23)  4.7 (2/1/22)  4.0 (1/3/20)                Review of Systems   Constitutional:  Negative for activity change, chills, fatigue and fever.   HENT:  Negative for congestion, rhinorrhea and sore throat.    Eyes:  Negative for photophobia, redness and visual disturbance.   Respiratory:  Negative for cough, shortness of breath and wheezing.    Cardiovascular:  Negative for chest pain, palpitations and leg swelling.   Gastrointestinal:  Negative for abdominal pain, diarrhea, nausea and vomiting.   Genitourinary:  Negative for difficulty urinating, dysuria, flank pain, frequency, hematuria and urgency.   Neurological:  Negative for weakness, light-headedness and headaches.          Objective   /92 (BP Location: Left arm, Patient Position: Sitting, Cuff Size: Standard)   Pulse 62   Ht 5' 8\" (1.727 m)   Wt 88 kg (194 lb)   SpO2 98%   BMI 29.50 kg/m²     Physical Exam  Vitals and nursing note reviewed.   Constitutional:       General: He is not in acute distress.     Appearance: He is well-developed.   HENT:      Head: Normocephalic and atraumatic.   Eyes:      Conjunctiva/sclera: Conjunctivae normal.   Cardiovascular:      Rate and Rhythm: Normal rate and regular rhythm.      Heart sounds: No murmur heard.  Pulmonary:      Effort: Pulmonary effort is normal. No respiratory distress.      Breath sounds: Normal breath sounds.   Abdominal:      Palpations: Abdomen is soft.      Tenderness: There is no abdominal tenderness.   Genitourinary:     Comments: NYLA prostate is smooth, nontender.  No palpable nodules or asymmetry.  Prostate is enlarged about 45 g.  Musculoskeletal:         General: No swelling.      Cervical back: Neck supple.   Skin:     General: " Skin is warm and dry.      Capillary Refill: Capillary refill takes less than 2 seconds.   Neurological:      Mental Status: He is alert.   Psychiatric:         Mood and Affect: Mood normal.        Results   Lab Results   Component Value Date    PSA 4.689 (H) 10/23/2024    PSA 4.198 (H) 07/19/2024    PSA 5.5 (H) 05/11/2023     Lab Results   Component Value Date    GLUCOSE 92 09/02/2016    CALCIUM 9.3 05/15/2024     09/02/2016    K 4.3 05/15/2024    CO2 26 05/15/2024     05/15/2024    BUN 24 05/15/2024    CREATININE 1.28 05/15/2024     Lab Results   Component Value Date    WBC 5.32 07/19/2024    HGB 15.2 07/19/2024    HCT 45.5 07/19/2024     (H) 07/19/2024     07/19/2024       Office Urine Dip  No results found for this or any previous visit (from the past hour).

## 2025-03-13 ENCOUNTER — APPOINTMENT (OUTPATIENT)
Dept: LAB | Facility: CLINIC | Age: 55
End: 2025-03-13
Payer: COMMERCIAL

## 2025-03-13 ENCOUNTER — TRANSCRIBE ORDERS (OUTPATIENT)
Dept: LAB | Facility: CLINIC | Age: 55
End: 2025-03-13

## 2025-03-13 DIAGNOSIS — E03.9 PRIMARY HYPOTHYROIDISM: ICD-10-CM

## 2025-03-13 DIAGNOSIS — Z13.0 SCREENING FOR DEFICIENCY ANEMIA: ICD-10-CM

## 2025-03-13 DIAGNOSIS — R79.89 LOW TESTOSTERONE: ICD-10-CM

## 2025-03-13 DIAGNOSIS — E78.5 DYSLIPIDEMIA: ICD-10-CM

## 2025-03-13 DIAGNOSIS — R97.20 ELEVATED PSA: ICD-10-CM

## 2025-03-13 LAB
ALBUMIN SERPL BCG-MCNC: 4.7 G/DL (ref 3.5–5)
ALP SERPL-CCNC: 52 U/L (ref 34–104)
ALT SERPL W P-5'-P-CCNC: 20 U/L (ref 7–52)
ANION GAP SERPL CALCULATED.3IONS-SCNC: 10 MMOL/L (ref 4–13)
AST SERPL W P-5'-P-CCNC: 25 U/L (ref 13–39)
BASOPHILS # BLD AUTO: 0.04 THOUSANDS/ÂΜL (ref 0–0.1)
BASOPHILS NFR BLD AUTO: 1 % (ref 0–1)
BILIRUB SERPL-MCNC: 0.77 MG/DL (ref 0.2–1)
BUN SERPL-MCNC: 27 MG/DL (ref 5–25)
CALCIUM SERPL-MCNC: 9.5 MG/DL (ref 8.4–10.2)
CHLORIDE SERPL-SCNC: 102 MMOL/L (ref 96–108)
CHOLEST SERPL-MCNC: 184 MG/DL (ref ?–200)
CO2 SERPL-SCNC: 28 MMOL/L (ref 21–32)
CREAT SERPL-MCNC: 1.27 MG/DL (ref 0.6–1.3)
EOSINOPHIL # BLD AUTO: 0.11 THOUSAND/ÂΜL (ref 0–0.61)
EOSINOPHIL NFR BLD AUTO: 2 % (ref 0–6)
ERYTHROCYTE [DISTWIDTH] IN BLOOD BY AUTOMATED COUNT: 13.3 % (ref 11.6–15.1)
GFR SERPL CREATININE-BSD FRML MDRD: 62 ML/MIN/1.73SQ M
GLUCOSE P FAST SERPL-MCNC: 105 MG/DL (ref 65–99)
HCT VFR BLD AUTO: 46.7 % (ref 36.5–49.3)
HDLC SERPL-MCNC: 64 MG/DL
HGB BLD-MCNC: 15.5 G/DL (ref 12–17)
IMM GRANULOCYTES # BLD AUTO: 0.01 THOUSAND/UL (ref 0–0.2)
IMM GRANULOCYTES NFR BLD AUTO: 0 % (ref 0–2)
LDLC SERPL CALC-MCNC: 106 MG/DL (ref 0–100)
LYMPHOCYTES # BLD AUTO: 1.55 THOUSANDS/ÂΜL (ref 0.6–4.47)
LYMPHOCYTES NFR BLD AUTO: 27 % (ref 14–44)
MCH RBC QN AUTO: 32.6 PG (ref 26.8–34.3)
MCHC RBC AUTO-ENTMCNC: 33.2 G/DL (ref 31.4–37.4)
MCV RBC AUTO: 98 FL (ref 82–98)
MONOCYTES # BLD AUTO: 0.52 THOUSAND/ÂΜL (ref 0.17–1.22)
MONOCYTES NFR BLD AUTO: 9 % (ref 4–12)
NEUTROPHILS # BLD AUTO: 3.51 THOUSANDS/ÂΜL (ref 1.85–7.62)
NEUTS SEG NFR BLD AUTO: 61 % (ref 43–75)
NRBC BLD AUTO-RTO: 0 /100 WBCS
PLATELET # BLD AUTO: 212 THOUSANDS/UL (ref 149–390)
PMV BLD AUTO: 11.4 FL (ref 8.9–12.7)
POTASSIUM SERPL-SCNC: 4.4 MMOL/L (ref 3.5–5.3)
PROT SERPL-MCNC: 6.9 G/DL (ref 6.4–8.4)
PSA SERPL-MCNC: 4 NG/ML (ref 0–4)
RBC # BLD AUTO: 4.75 MILLION/UL (ref 3.88–5.62)
SODIUM SERPL-SCNC: 140 MMOL/L (ref 135–147)
T4 FREE SERPL-MCNC: 0.76 NG/DL (ref 0.61–1.12)
TRIGL SERPL-MCNC: 69 MG/DL (ref ?–150)
TSH SERPL DL<=0.05 MIU/L-ACNC: 2.61 UIU/ML (ref 0.45–4.5)
WBC # BLD AUTO: 5.74 THOUSAND/UL (ref 4.31–10.16)

## 2025-03-13 PROCEDURE — 84443 ASSAY THYROID STIM HORMONE: CPT

## 2025-03-13 PROCEDURE — 85025 COMPLETE CBC W/AUTO DIFF WBC: CPT

## 2025-03-13 PROCEDURE — 80053 COMPREHEN METABOLIC PANEL: CPT

## 2025-03-13 PROCEDURE — 84403 ASSAY OF TOTAL TESTOSTERONE: CPT

## 2025-03-13 PROCEDURE — 36415 COLL VENOUS BLD VENIPUNCTURE: CPT

## 2025-03-13 PROCEDURE — 80061 LIPID PANEL: CPT

## 2025-03-13 PROCEDURE — 84153 ASSAY OF PSA TOTAL: CPT

## 2025-03-13 PROCEDURE — 84402 ASSAY OF FREE TESTOSTERONE: CPT

## 2025-03-13 PROCEDURE — 84439 ASSAY OF FREE THYROXINE: CPT

## 2025-03-15 LAB
TESTOST FREE SERPL-MCNC: 26.4 PG/ML (ref 7.2–24)
TESTOST SERPL-MCNC: 1500 NG/DL (ref 264–916)

## 2025-03-16 ENCOUNTER — RESULTS FOLLOW-UP (OUTPATIENT)
Dept: FAMILY MEDICINE CLINIC | Facility: CLINIC | Age: 55
End: 2025-03-16

## 2025-03-18 ENCOUNTER — RESULTS FOLLOW-UP (OUTPATIENT)
Dept: UROLOGY | Facility: CLINIC | Age: 55
End: 2025-03-18

## 2025-03-18 DIAGNOSIS — R79.89 LOW TESTOSTERONE: Primary | ICD-10-CM

## 2025-03-19 NOTE — TELEPHONE ENCOUNTER
Spoke directly with patient and advised of provider note. Patient verbalized understanding and thanked office for call.         ----- Message from PRIMO Encinas sent at 3/18/2025  8:27 AM EDT -----  Patient's testosterone levels are very elevated, he should decrease his dose to 100 mg once per week. Let's plan to recheck testosterone in 6 weeks.

## 2025-04-01 DIAGNOSIS — R97.20 ELEVATED PSA: ICD-10-CM

## 2025-04-01 DIAGNOSIS — R79.89 LOW TESTOSTERONE: ICD-10-CM

## 2025-04-02 RX ORDER — TESTOSTERONE CYPIONATE 200 MG/ML
100 INJECTION, SOLUTION INTRAMUSCULAR
Qty: 2 ML | Refills: 0 | Status: SHIPPED | OUTPATIENT
Start: 2025-04-02

## 2025-04-02 RX ORDER — TADALAFIL 5 MG/1
5 TABLET ORAL DAILY PRN
Qty: 30 TABLET | Refills: 1 | Status: SHIPPED | OUTPATIENT
Start: 2025-04-02

## 2025-05-06 ENCOUNTER — OFFICE VISIT (OUTPATIENT)
Dept: URGENT CARE | Facility: MEDICAL CENTER | Age: 55
End: 2025-05-06
Payer: COMMERCIAL

## 2025-05-06 VITALS
DIASTOLIC BLOOD PRESSURE: 87 MMHG | SYSTOLIC BLOOD PRESSURE: 141 MMHG | TEMPERATURE: 97.1 F | OXYGEN SATURATION: 100 % | HEART RATE: 64 BPM | RESPIRATION RATE: 18 BRPM

## 2025-05-06 DIAGNOSIS — R05.1 ACUTE COUGH: Primary | ICD-10-CM

## 2025-05-06 DIAGNOSIS — R97.20 ELEVATED PSA: ICD-10-CM

## 2025-05-06 DIAGNOSIS — R09.81 SINUS CONGESTION: ICD-10-CM

## 2025-05-06 DIAGNOSIS — R79.89 LOW TESTOSTERONE: ICD-10-CM

## 2025-05-06 PROCEDURE — G0382 LEV 3 HOSP TYPE B ED VISIT: HCPCS | Performed by: PHYSICIAN ASSISTANT

## 2025-05-06 RX ORDER — AZITHROMYCIN 250 MG/1
TABLET, FILM COATED ORAL
Qty: 6 TABLET | Refills: 0 | Status: SHIPPED | OUTPATIENT
Start: 2025-05-06 | End: 2025-05-10

## 2025-05-06 NOTE — TELEPHONE ENCOUNTER
"RX Refill    Patient is calling to request a prescription refill.      Last seen by: Norah Yates     Medication:   tadalafil (CIALIS) 5 MG tablet       testosterone cypionate (DEPO-TESTOSTERONE) 200 mg/mL SOLN       SYRINGE-NEEDLE, DISP, 3 ML 23G X 1-1/2\" 3 ML [facility]     Pharmacy:   [facility] [address] [place] - [address]  [address] [address]  Phone: [phone]  Fax: [phone]         Pt can be reached at: [phone]  "

## 2025-05-06 NOTE — PATIENT INSTRUCTIONS
Cough  Sinus congestion  Zithromax as directed  Humidifier in bedroom, steam from shower  Follow up with PCP in 3-5 days.  Proceed to  ER if symptoms worsen.

## 2025-05-06 NOTE — PROGRESS NOTES
[facility]        NAME: Travis Badillo is a 54 y.o. male  : 1970    MRN: [id number]  DATE: May 6, 2025  TIME: 7:00 PM    Assessment and Plan   Acute cough [R05.1]  1. Acute cough  azithromycin (ZITHROMAX) 250 mg tablet      2. Sinus congestion              Patient Instructions     Cough  Sinus congestion  Zithromax as directed  Humidifier in bedroom, steam from shower  Follow up with PCP in 3-5 days.  Proceed to  ER if symptoms worsen.    Chief Complaint     Chief Complaint   Patient presents with    sinus congestion     Pt with sinus congestion, cough, sinus pressure, sore throat x6 days.         History of Present Illness       54-year-old male who presents complaining of cough, congestion, runny nose, sinus pressure/pain x 6 days.  Patient states that he has been taking over-the-counter medication with no relief.  Denies fever, chills, chest pain, shortness of breath.        Review of Systems   Review of Systems   Constitutional: Negative.    HENT:  Positive for congestion, sinus pressure and sinus pain. Negative for dental problem, drooling, ear pain, postnasal drip, rhinorrhea, trouble swallowing and voice change.    Eyes: Negative.    Respiratory:  Positive for cough. Negative for apnea, choking, chest tightness, shortness of breath, wheezing and stridor.    Cardiovascular: Negative.  Negative for chest pain.         Current Medications       Current Outpatient Medications:     Azelastine-Fluticasone 137-50 MCG/ACT SUSP, 1 spray into each nostril 2 (two) times a day, Disp: , Rfl:     azithromycin (ZITHROMAX) 250 mg tablet, Take 2 tablets today then 1 tablet daily x 4 days, Disp: 6 tablet, Rfl: 0    other medication, see sig,, Take by mouth daily Medication/product name: [place] Vanegas Strength: 100   Sig (include dose, route, frequency): mouth, Disp: , Rfl:     tadalafil (CIALIS) 5 MG tablet, Take 1 tablet (5 mg total) by mouth daily as needed for erectile dysfunction, Disp: 30 tablet, Rfl:  "1    testosterone cypionate (DEPO-TESTOSTERONE) 200 mg/mL SOLN, Inject 0.5 mL (100 mg total) into a muscle every 7 days, Disp: 2 mL, Rfl: 0    NEEDLE, DISP, 18 G 18G X 1-1/2\" MISC, Use every 14 (fourteen) days, Disp: 30 each, Rfl: 0    omeprazole (PriLOSEC) 40 MG capsule, Take 40 mg by mouth if needed (Patient not taking: Reported on 3/10/2025), Disp: , Rfl:     Synthroid 100 MCG tablet, Take 1 tablet (100 mcg total) by mouth daily (Patient not taking: Reported on 3/10/2025), Disp: 90 tablet, Rfl: 3    SYRINGE-NEEDLE, DISP, 3 ML 23G X 1-1/2\" 3 ML MISC, Use every 7 days, Disp: 10 each, Rfl: 0    Syringe/Needle, Disp, (SYRINGE 3CC/83DX5-7/2\") 25G X 1-1/2\" 3 ML MISC, Use every 7 days, Disp: 10 each, Rfl: 0    Current Allergies     Allergies as of 05/06/2025 - Reviewed 05/06/2025   Allergen Reaction Noted    Amoxicillin-pot clavulanate Rash 11/27/2017    Penicillins Anxiety, Hives, Other (See Comments), GI Intolerance, Photosensitivity, and Rash 11/27/2017            The following portions of the patient's history were reviewed and updated as appropriate: allergies, current medications, past family history, past medical history, past social history, past surgical history and problem list.     Past Medical History:   Diagnosis Date    Colon polyp     Disease of thyroid gland     Hypothyroid     Pollen allergies     PTSD (post-traumatic stress disorder)     Sleep apnea     Testosterone deficiency 2015       Past Surgical History:   Procedure Laterality Date    APPENDECTOMY  1985    ARTHROSCOPY KNEE Right 6/30/2016    Procedure: ARTHROSCOPY KNEE, MEDIAL MENISCECTOMY;  Surgeon: David Chawla MD;  Location: [facility] MAIN OR;  Service:     INJECTION ANESTHETIC AGENT TO CERVICAL PLEXUS      KNEE ARTHROSCOPY Right 1990    AL COLONOSCOPY FLX DX W/COLLJ SPEC WHEN PFRMD N/A 4/22/2019    Procedure: COLONOSCOPY;  Surgeon: Davion Rai MD;  [facility]: [facility]  GI LAB;  Service: Gastroenterology    AL EXCISION TUMOR SOFT TIS " BACK/FLANK SUBQ 3 CM/> N/A 10/6/2022    Procedure: EXCISION  BIOPSY LESION/MASS BACK;  Surgeon: Jef Amador DO;  [facility]: [facility] MAIN OR;  Service: General       Family History   Problem Relation Age of Onset    Breast cancer Mother     Cancer Mother         Breast Cancer    Cancer Sister         3 sisters thyroid CA    No Known Problems Father     Hashimoto's thyroiditis Sister     Melanoma Maternal Aunt          Medications have been verified.        Objective   /87   Pulse 64   Temp (!) 97.1 °F (36.2 °C)   Resp 18   SpO2 100%        Physical Exam     Physical Exam  Constitutional:       General: He is not in acute distress.     Appearance: Normal appearance. He is well-developed. He is not diaphoretic.   HENT:      Head: Normocephalic and atraumatic.      Right Ear: Hearing, tympanic membrane, ear canal and external ear normal.      Left Ear: Hearing, tympanic membrane, ear canal and external ear normal.      Nose: Rhinorrhea present.      Right Sinus: Maxillary sinus tenderness present. No frontal sinus tenderness.      Left Sinus: Maxillary sinus tenderness present. No frontal sinus tenderness.      Mouth/Throat:      Pharynx: Uvula midline.   Cardiovascular:      Rate and Rhythm: Normal rate and regular rhythm.      Heart sounds: Normal heart sounds.   Pulmonary:      Effort: Pulmonary effort is normal. No respiratory distress.      Breath sounds: Normal breath sounds. No stridor. No wheezing, rhonchi or rales.   Chest:      Chest wall: No tenderness.   Musculoskeletal:      Cervical back: Normal range of motion and neck supple.   Lymphadenopathy:      Cervical: No cervical adenopathy.   Neurological:      Mental Status: He is alert.

## 2025-05-07 RX ORDER — TADALAFIL 5 MG/1
5 TABLET ORAL DAILY PRN
Qty: 30 TABLET | Refills: 1 | Status: SHIPPED | OUTPATIENT
Start: 2025-05-07

## 2025-05-12 ENCOUNTER — TELEPHONE (OUTPATIENT)
Age: 55
End: 2025-05-12

## 2025-05-12 RX ORDER — TESTOSTERONE CYPIONATE 200 MG/ML
100 INJECTION, SOLUTION INTRAMUSCULAR
Qty: 2 ML | Refills: 0 | Status: SHIPPED | OUTPATIENT
Start: 2025-05-12

## 2025-05-12 NOTE — TELEPHONE ENCOUNTER
PRIMO Jensen     5/12/25  3:54 PM  Note      Rx refill sent. Patient needs to repeat testosterone lab. This was elevated on 3/13 and a repeat testosterone was ordered to be completed in 6 weeks.         I called patient, left message to call back the office to discuss lab work that was ordered for him.

## 2025-05-12 NOTE — TELEPHONE ENCOUNTER
Rx refill sent. Patient needs to repeat testosterone lab. This was elevated on 3/13 and a repeat testosterone was ordered to be completed in 6 weeks.

## 2025-05-12 NOTE — TELEPHONE ENCOUNTER
Patient was calling again for the refill of his testosterone. He stated that his weeks are now thrown office because he has not gotten a refill for the medication.     Can this please be reviewed with the provider?    Medication:  testosterone cypionate (DEPO-TESTOSTERONE) 200 mg/mL    Last Sent:   4/2/2025    Pharmacy:   [facility] [address] [place]    Patient would appreciate a call from the office to let him know why it has not been sent in yet.

## 2025-06-02 ENCOUNTER — TELEPHONE (OUTPATIENT)
Age: 55
End: 2025-06-02

## 2025-06-02 DIAGNOSIS — R79.89 LOW TESTOSTERONE: Primary | ICD-10-CM

## 2025-06-02 NOTE — TELEPHONE ENCOUNTER
Patient called to confirm his office visit on 6/11.  Patient also asking for a refill on his syringes and needles for his Testerone injections.    Please advise.

## 2025-06-11 ENCOUNTER — OFFICE VISIT (OUTPATIENT)
Dept: UROLOGY | Facility: CLINIC | Age: 55
End: 2025-06-11
Payer: COMMERCIAL

## 2025-06-11 VITALS — BODY MASS INDEX: 29.55 KG/M2 | HEART RATE: 67 BPM | HEIGHT: 68 IN | OXYGEN SATURATION: 98 % | WEIGHT: 195 LBS

## 2025-06-11 DIAGNOSIS — N48.6 PEYRONIE'S DISEASE: Primary | ICD-10-CM

## 2025-06-11 PROCEDURE — 99213 OFFICE O/P EST LOW 20 MIN: CPT | Performed by: UROLOGY

## 2025-06-11 NOTE — PROGRESS NOTES
UROLOGY FOLLOW-UP ENCOUNTER    Travis Badillo is a 54 y.o. male with Peyronie's disease, low testosterone    Anticoagulation: none     Negative prostate biopsy in 2019     Multiparametric MRI of the prostate July 2022 which returned PI-RADS 2 no dominant lesion and calculated prostate volume of 46 mL      CT renal protocol 7/10/23: mild prostatic enlargement; otherwise negative     On Cialis 5 mg PRN sex since around 2023     Prev had outside provider manage TRT     Labs 7/19/24: PSA 4.198, total T, Hb 15.2, Hct 45.5     Restarted on TRT by [facility] uro in June 2024     U dip neg on 7/29/24     PVR 0 cc on 7/29/24        Penile curvature history:  -Penile curvature present since birth? No  -Date penile curvature was first noticed: around mid 2023  -Penile curvature painful during erections? Never  -Direction of penile curvature: left  -Patient estimation of degree of penile curvature: about 45 degrees  -Does patient have penile plaque present? If yes, where?: Left shaft  -Does patient require medication to achieve erection? Yes  --Medication: Cialis 5 mg  -Does the penile curvature make intercourse difficult or painful for patient: No  -Does the penile curvature make intercourse difficult or painful for the partner: No  -Previous therapies attempted for penile curvature (medical or surgical): none     About 2 cm left dorsal plaque at mid shaft on exam 7/29/24     Office test direction 10/2/2024:  0.75 units of standard Trimix given  45 degree leftward curvature  Plaque about 3.5 cm proximal to the corona  Total penile length 15 cm     Xiaflex cycle #1, injection #1 on 1/22/2025    Xiaflex cycle #1, injection #2 on 1/29/2025    Office 6/11/2025: Slight improvement of curvature.  Still having some difficulty with intercourse.      Assessment and plan:     Peyronie's disease    He did get some minor benefit from the first Xiaflex cycle.  He would like to try another cycle to see if he can improve the curvature even  "more.    I therefore sent a message to the clinical staff to set him up for another Xiaflex cycle.  I explained that the office would be in touch with him to set this up.  He understands the risks and benefits of another Xiaflex injection.    I did also talk about the possibility of trying a traction device in addition to his next Xiaflex cycle.  I did talk to him about the traction device product from the company RestoreX.  I explained that if this is used in conjunction with his next cycle stretches, he may get improved benefit from the injections.  I explained that the downside is that there is a fairly large out-of-pocket cost.  I did provide him with the website for him to consider getting.  He explained that he would likely just try to do regular stretches for the next cycle but would consider this if he has to go through another cycle of Xiaflex.          PLAN  -He will proceed with a second Xiaflex injection cycle  -We discussed possibly purchasing the RestoreX device to enhance the stretches during his next Xiaflex cycle.  He understands that this is an out-of-pocket expense.  He will look further into the website and consider this.  -Cont TRT. 100 mcg per week. Needs to get another total T relatively soon to make sure his total T came down since it was 1500 in March 2025.            Portions of the above record have been created with voice recognition software.  Occasional wrong word or \"sound alike\" substitution may have occurred due to the inherent limitations of voice recognition software.  Read the chart carefully and recognize, using context, where substitution may have occurred.      Sanket Moreland DO        Chief Complaint     Peyronie's disease    History of Present Illness     See summary above    No fevers or chills          The following portions of the patient's history were reviewed and updated as appropriate: allergies, current medications, past family history, past medical history, " "past social history, past surgical history and problem list.            Review of Systems     Review of Systems   Constitutional:  Negative for chills and fever.   Respiratory:  Negative for cough and shortness of breath.    Genitourinary:  Negative for dysuria and hematuria.   Neurological:  Negative for dizziness and headaches.   Psychiatric/Behavioral:  Negative for agitation and behavioral problems.        Allergies     Allergies[1]    Physical Exam     Physical Exam  Constitutional:       General: He is not in acute distress.  HENT:      Head: Normocephalic and atraumatic.   Pulmonary:      Effort: Pulmonary effort is normal. No respiratory distress.   Abdominal:      General: Abdomen is flat.      Palpations: Abdomen is soft.      Tenderness: There is no right CVA tenderness or left CVA tenderness.     Skin:     General: Skin is warm and dry.     Neurological:      General: No focal deficit present.      Mental Status: He is alert and oriented to person, place, and time.     Psychiatric:         Mood and Affect: Mood normal.         Behavior: Behavior normal.             Vital Signs  Vitals:    06/11/25 1047   Pulse: 67   SpO2: 98%   Weight: 88.5 kg (195 lb)   Height: 5' 8\" (1.727 m)         Current Medications     Current Medications[2]    Active Problems     Problem List[3]    Past Medical History     Past Medical History[4]    Surgical History     Past Surgical History[5]      Family History     Family History[6]    Social History     Social History     Tobacco Use History[7]    Pertinent Lab Values     Lab Results   Component Value Date    CREATININE 1.27 03/13/2025       Lab Results   Component Value Date    PSA 3.997 03/13/2025    PSA 4.689 (H) 10/23/2024    PSA 4.198 (H) 07/19/2024       Pertinent Imaging     N/A    Pertinent Pathology     N/A      I have spent a total time of 20 minutes in caring for this patient on the day of the visit/encounter including Prognosis, Risks and benefits of tx options, " "Instructions for management, Patient and family education, Importance of tx compliance, Impressions, Counseling / Coordination of care, Documenting in the medical record, Reviewing/placing orders in the medical record (including tests, medications, and/or procedures), and Obtaining or reviewing history  .           [1]   Allergies  Allergen Reactions    Amoxicillin-Pot Clavulanate Rash    Penicillins Anxiety, Hives, Other (See Comments), GI Intolerance, Photosensitivity and Rash   [2]   Current Outpatient Medications:     Azelastine-Fluticasone 137-50 MCG/ACT SUSP, 1 spray into each nostril in the morning and 1 spray in the evening., Disp: , Rfl:     NEEDLE, DISP, 18 G 18G X 1-1/2\" MISC, Use every 14 (fourteen) days, Disp: 30 each, Rfl: 0    NEEDLE, DISP, 22 G 22G X 1-1/2\" MISC, Use once a week, Disp: 50 each, Rfl: 0    other medication, see sig,, Take by mouth in the morning. Medication/product name: [place] Vanegas Strength: 100   Sig (include dose, route, frequency): mouth., Disp: , Rfl:     SYRINGE-NEEDLE, DISP, 3 ML 23G X 1-1/2\" 3 ML MISC, Use every 7 days, Disp: 10 each, Rfl: 0    Syringe/Needle, Disp, (SYRINGE 3CC/74SS5-4/2\") 25G X 1-1/2\" 3 ML MISC, Use every 7 days, Disp: 10 each, Rfl: 0    Syringe/Needle, Disp, 18G X 1\" 3 ML MISC, Use once a week, Disp: 50 each, Rfl: 0    tadalafil (CIALIS) 5 MG tablet, Take 1 tablet (5 mg total) by mouth daily as needed for erectile dysfunction, Disp: 30 tablet, Rfl: 1    testosterone cypionate (DEPO-TESTOSTERONE) 200 mg/mL SOLN, Inject 0.5 mL (100 mg total) into a muscle every 7 days, Disp: 2 mL, Rfl: 0    omeprazole (PriLOSEC) 40 MG capsule, Take 40 mg by mouth if needed (Patient not taking: Reported on 3/10/2025), Disp: , Rfl:     Synthroid 100 MCG tablet, Take 1 tablet (100 mcg total) by mouth daily (Patient not taking: Reported on 3/10/2025), Disp: 90 tablet, Rfl: 3  [3]   Patient Active Problem List  Diagnosis    Allergic rhinitis    Elevated liver enzymes    Primary " hypothyroidism    Localized osteoarthritis of right knee    Organic impotence    Rosacea    Lipoma of torso    History of colon polyps    Arthritis of right knee    Gross hematuria    Elevated PSA    Dyslipidemia   [4]   Past Medical History:  Diagnosis Date    Colon polyp     Disease of thyroid gland     Hypothyroid     Pollen allergies     PTSD (post-traumatic stress disorder)     Sleep apnea     Testosterone deficiency 2015   [5]   Past Surgical History:  Procedure Laterality Date    APPENDECTOMY  1985    ARTHROSCOPY KNEE Right 6/30/2016    Procedure: ARTHROSCOPY KNEE, MEDIAL MENISCECTOMY;  Surgeon: David Chawla MD;  Location: [facility] MAIN OR;  Service:     INJECTION ANESTHETIC AGENT TO CERVICAL PLEXUS      KNEE ARTHROSCOPY Right 1990    NV COLONOSCOPY FLX DX W/COLLJ SPEC WHEN PFRMD N/A 4/22/2019    Procedure: COLONOSCOPY;  Surgeon: Davion Rai MD;  Location[facility]  GI LAB;  Service: Gastroenterology    NV EXCISION TUMOR SOFT TIS BACK/FLANK SUBQ 3 CM/> N/A 10/6/2022    Procedure: EXCISION  BIOPSY LESION/MASS BACK;  Surgeon: Jef Amador DO;  [facility] MAIN OR;  Service: General   [6]   Family History  Problem Relation Name Age of Onset    Breast cancer Mother Magda Badillo     Cancer Mother Magda Badillo         Breast Cancer    Cancer Sister Naa         3 sisters thyroid CA    No Known Problems Father      Hashimoto's thyroiditis Sister      Melanoma Maternal Aunt     [7]   Social History  Tobacco Use   Smoking Status Never    Passive exposure: Never   Smokeless Tobacco Never

## 2025-06-11 NOTE — PATIENT INSTRUCTIONS
Look up product called RestoreX    [email or web address]://www.restorex.com/    You will have to purchase out of pocket but this could be used after your next injection cycle to possible enhance the effects of the medicine.

## 2025-06-11 NOTE — Clinical Note
Tracey Almazan You are following this julianne for TRT I am following him for Peyronie's Looks like his last total T was 1500 and you cut his dose in half.  He never got his follow-up total T.  I told him to get this relatively soon to make sure the new dose reduction effectively decrease his total testosterone.  He is still having good symptom improvement with the new testosterone dose.

## 2025-06-12 ENCOUNTER — TELEPHONE (OUTPATIENT)
Dept: UROLOGY | Facility: CLINIC | Age: 55
End: 2025-06-12

## 2025-06-12 NOTE — TELEPHONE ENCOUNTER
Patient previously had elevated testosterone back in March, we adjusted his testosterone dosage.  I did order a repeat testosterone lab for him to obtain, however, he did not get this done.  Could you please notify patient that the lab is ordered for him to complete?

## 2025-06-13 ENCOUNTER — TELEPHONE (OUTPATIENT)
Dept: UROLOGY | Facility: CLINIC | Age: 55
End: 2025-06-13

## 2025-06-13 NOTE — TELEPHONE ENCOUNTER
----- Message from Sanket Moreland DO sent at 6/11/2025 11:10 AM EDT -----  Please schedule him for another xiaflex injection cycle

## 2025-06-13 NOTE — TELEPHONE ENCOUNTER
1st attempt, called patient to inform him that he needs to call the [facility] pharmacy directly for the medication refill and left a voicemail message for patient to call pharmacy and also if he has any other concerns he can call the office back at ([phone].      See notes below:  Called [facility] specialty pharmacy and spoke with rep Coates. Procedure for refills is that the patient has to call them directly @ [phone] and request refills. The pharmacy will then contact us to set up delivery.

## 2025-06-13 NOTE — TELEPHONE ENCOUNTER
Relayed the following.      PRIMO Jensen   Patient previously had elevated testosterone back in March, we adjusted his testosterone dosage.  I did order a repeat testosterone lab for him to obtain, however, he did not get this done.  Could you please notify patient that the lab is ordered for him to complete?     Patient verbalized understanding.

## 2025-06-16 NOTE — TELEPHONE ENCOUNTER
Patient returning call, informed of message below, verbalized understanding    Natarossi Coronel to [facility]  BN    6/16/25  9:57 AM  Note     2nd attempt, called patient to inform him that he needs to call the [facility] directly for the medication refill and left a voicemail message for patient to call pharmacy and also if he has any other concerns he can call the office back at [phone].        See notes below:  Called [facility] pharmacy and spoke with rep Coates. Procedure for refills is that the patient has to call them directly @ [phone] and request refills. The pharmacy will then contact us to set up delivery.

## 2025-06-16 NOTE — TELEPHONE ENCOUNTER
2nd attempt, called patient to inform him that he needs to call the [facility] pharmacy directly for the medication refill and left a voicemail message for patient to call pharmacy and also if he has any other concerns he can call the office back at [phone].        See notes below:  Called [facility] specialty pharmacy and spoke with rep Coates. Procedure for refills is that the patient has to call them directly @ [phone] and request refills. The pharmacy will then contact us to set up delivery.

## 2025-06-19 NOTE — TELEPHONE ENCOUNTER
Rep called to let us know that the patients Xiaflex will be delivered to the office on 6/24 and will require a signature.

## 2025-07-02 ENCOUNTER — APPOINTMENT (OUTPATIENT)
Dept: LAB | Facility: CLINIC | Age: 55
End: 2025-07-02
Payer: COMMERCIAL

## 2025-07-02 DIAGNOSIS — R79.89 LOW TESTOSTERONE: Primary | ICD-10-CM

## 2025-07-02 PROCEDURE — 84403 ASSAY OF TOTAL TESTOSTERONE: CPT

## 2025-07-02 PROCEDURE — 84402 ASSAY OF FREE TESTOSTERONE: CPT

## 2025-07-02 PROCEDURE — 36415 COLL VENOUS BLD VENIPUNCTURE: CPT

## 2025-07-03 ENCOUNTER — RESULTS FOLLOW-UP (OUTPATIENT)
Dept: UROLOGY | Facility: CLINIC | Age: 55
End: 2025-07-03

## 2025-07-03 LAB
TESTOST FREE SERPL-MCNC: 5.9 PG/ML (ref 7.2–24)
TESTOST SERPL-MCNC: 402 NG/DL (ref 264–916)

## 2025-07-07 NOTE — TELEPHONE ENCOUNTER
A message was left informing patient of provider message below. A call back number for any questions was provided as [phone].      ----- Message from Jewels Lipscomb PA-C sent at 7/3/2025  7:01 PM EDT -----  therapeutic on chronic TRT at 100mg weekly. continue rx      ----- Message -----  From: Lab, Background User  Sent: 7/3/2025  12:06 PM EDT  To: PRIMO Jensen

## 2025-07-08 DIAGNOSIS — R97.20 ELEVATED PSA: ICD-10-CM

## 2025-07-08 DIAGNOSIS — R79.89 LOW TESTOSTERONE: ICD-10-CM

## 2025-07-08 NOTE — TELEPHONE ENCOUNTER
Medication Refill  Patient is calling to request a prescription refill for:  Cialis and Testerone   30 / 90 day supply: 90 day if possible    Pharmacy: [facility] pharmacy   Pt can be reached at:  [phone]      Pt is also requesting for both size sneedles and syringes to be sent in as well

## 2025-07-09 RX ORDER — TADALAFIL 5 MG/1
5 TABLET ORAL DAILY PRN
Qty: 30 TABLET | Refills: 5 | Status: SHIPPED | OUTPATIENT
Start: 2025-07-09

## 2025-07-09 RX ORDER — TESTOSTERONE CYPIONATE 200 MG/ML
100 INJECTION, SOLUTION INTRAMUSCULAR
Qty: 6 ML | Refills: 0 | Status: SHIPPED | OUTPATIENT
Start: 2025-07-09 | End: 2025-10-07

## 2025-07-09 NOTE — TELEPHONE ENCOUNTER
Medication:  PDMP 05/13/2025 05/12/2025 05/12/2025 Testosterone Cypionate (Oil) 2.0 14 200 MG/1 ML NA [id number] Commercial Insurance 0 / 0 PA   1 [id number] 04/07/2025 04/02/2025 04/02/2025 Testosterone Cypionate (Oil) 2.0 14 200 MG/1 ML [id number]  Refill must be reviewed and completed by the office or provider. The refill is unable to be approved or denied by the medication management team.

## (undated) DEVICE — SUT VICRYL 3-0 SH 27 IN J416H

## (undated) DEVICE — ADHESIVE SKIN HIGH VISCOSITY EXOFIN 1ML

## (undated) DEVICE — PAD GROUNDING ADULT

## (undated) DEVICE — NEEDLE 22 G X 1 1/2 SAFETY

## (undated) DEVICE — ENDOCUFF VISION MED BLUE ID 11.0

## (undated) DEVICE — SINGLE-USE POLYPECTOMY SNARE: Brand: SENSATION SHORT THROW

## (undated) DEVICE — DRAPE EQUIPMENT RF WAND

## (undated) DEVICE — CHLORAPREP HI-LITE 26ML ORANGE

## (undated) DEVICE — PLUMEPEN PRO 10FT

## (undated) DEVICE — SINGLE-USE BIOPSY FORCEPS: Brand: RADIAL JAW 4

## (undated) DEVICE — VIAL DECANTER

## (undated) DEVICE — SUT MONOCRYL 4-0 PS-2 18 IN Y496G

## (undated) DEVICE — GLOVE SRG BIOGEL ORTHOPEDIC 8

## (undated) DEVICE — BETHLEHEM UNIVERSAL MINOR GEN: Brand: CARDINAL HEALTH

## (undated) DEVICE — INTENDED FOR TISSUE SEPARATION, AND OTHER PROCEDURES THAT REQUIRE A SHARP SURGICAL BLADE TO PUNCTURE OR CUT.: Brand: BARD-PARKER SAFETY BLADES SIZE 15, STERILE